# Patient Record
Sex: FEMALE | Race: OTHER | Employment: UNEMPLOYED | URBAN - METROPOLITAN AREA
[De-identification: names, ages, dates, MRNs, and addresses within clinical notes are randomized per-mention and may not be internally consistent; named-entity substitution may affect disease eponyms.]

---

## 2022-11-03 ENCOUNTER — HOSPITAL ENCOUNTER (OUTPATIENT)
Dept: MAMMOGRAPHY | Facility: HOSPITAL | Age: 57
Discharge: HOME | End: 2022-11-03
Attending: FAMILY MEDICINE
Payer: MEDICAID

## 2022-11-03 DIAGNOSIS — Z12.31 ENCOUNTER FOR SCREENING MAMMOGRAM FOR MALIGNANT NEOPLASM OF BREAST: ICD-10-CM

## 2022-11-03 PROCEDURE — 77063 BREAST TOMOSYNTHESIS BI: CPT | Performed by: FAMILY MEDICINE

## 2022-11-03 PROCEDURE — 77067 SCR MAMMO BI INCL CAD: CPT | Performed by: FAMILY MEDICINE

## 2022-12-20 ENCOUNTER — HOSPITAL ENCOUNTER (OUTPATIENT)
Dept: ULTRASOUND IMAGING | Facility: HOSPITAL | Age: 57
Discharge: HOME | End: 2022-12-20
Attending: FAMILY MEDICINE
Payer: MEDICAID

## 2022-12-20 ENCOUNTER — HOSPITAL ENCOUNTER (OUTPATIENT)
Dept: MAMMOGRAPHY | Facility: HOSPITAL | Age: 57
Discharge: HOME | End: 2022-12-20
Attending: FAMILY MEDICINE
Payer: MEDICAID

## 2022-12-20 DIAGNOSIS — R92.8 ABNORMAL MAMMOGRAM: ICD-10-CM

## 2022-12-20 PROCEDURE — 77061 BREAST TOMOSYNTHESIS UNI: CPT | Performed by: FAMILY MEDICINE

## 2022-12-20 PROCEDURE — 77065 DX MAMMO INCL CAD UNI: CPT | Performed by: FAMILY MEDICINE

## 2022-12-20 PROCEDURE — 76642 ULTRASOUND BREAST LIMITED: CPT | Performed by: FAMILY MEDICINE

## 2023-05-15 ENCOUNTER — OFFICE VISIT (OUTPATIENT)
Dept: ORTHOPEDICS CLINIC | Facility: CLINIC | Age: 58
End: 2023-05-15

## 2023-05-15 ENCOUNTER — HOSPITAL ENCOUNTER (OUTPATIENT)
Dept: GENERAL RADIOLOGY | Facility: HOSPITAL | Age: 58
Discharge: HOME | End: 2023-05-15
Attending: ORTHOPAEDIC SURGERY
Payer: MEDICAID

## 2023-05-15 VITALS — WEIGHT: 184 LBS | BODY MASS INDEX: 38.1 KG/M2 | HEIGHT: 58.27 IN

## 2023-05-15 DIAGNOSIS — M25.561 PAIN IN BOTH KNEES, UNSPECIFIED CHRONICITY: ICD-10-CM

## 2023-05-15 DIAGNOSIS — M25.562 PAIN IN BOTH KNEES, UNSPECIFIED CHRONICITY: ICD-10-CM

## 2023-05-15 DIAGNOSIS — M17.11 PRIMARY OSTEOARTHRITIS OF RIGHT KNEE: Primary | ICD-10-CM

## 2023-05-15 DIAGNOSIS — E66.01 CLASS 2 SEVERE OBESITY DUE TO EXCESS CALORIES WITH SERIOUS COMORBIDITY AND BODY MASS INDEX (BMI) OF 38.0 TO 38.9 IN ADULT (HCC): ICD-10-CM

## 2023-05-15 DIAGNOSIS — M17.12 PRIMARY OSTEOARTHRITIS OF LEFT KNEE: ICD-10-CM

## 2023-05-15 PROCEDURE — 73562 X-RAY EXAM OF KNEE 3: CPT | Performed by: ORTHOPAEDIC SURGERY

## 2023-05-22 ENCOUNTER — TELEPHONE (OUTPATIENT)
Dept: ORTHOPEDICS CLINIC | Facility: CLINIC | Age: 58
End: 2023-05-22

## 2023-05-22 NOTE — TELEPHONE ENCOUNTER
Received [employer] Durolane injection denial letter dated 05/21/2023. Tracking # [id number]  There is an [email or web address] peer to peer appeal option within 7 calendar days of 05/21/2023, at [phone]. Ortho staff, how do you want to proceed? Per letter patient did not meet 2 or more of the required criteria:  1. Tried physical therapy  2. Failed to respond to at least two different classes of drugs such as tylenol or advil or drugs used on top of the skin. 3.  Failed to respond to at least two cortisone injections. Your records do not show that two or more of these apply to you.  Unable to approve this request.

## 2023-05-22 NOTE — TELEPHONE ENCOUNTER
Patient seen in office 5/15/23 per note patient was not given cortisone due to her uncontrolled blood glucose levels. Advised hyalyronic acid injections instead of cortisone.     Gel injection for bilateral knees denied see below and advise how you would like to proceed

## 2023-05-22 NOTE — TELEPHONE ENCOUNTER
Glucose levels were 182. Have her keep an eye on them. If they run around 150 or less, reschedule for a cortisone injection. Her insurance won't approve HA without a cortisone injection.

## 2023-05-23 NOTE — TELEPHONE ENCOUNTER
Spoke with patient using  advised her unforunately insurance denied the gel injections for her bilateral knees. Per Dr. Kristel Dixon her blood glucose levels need to be 150 or less in order to proceed with cortisone injections. Advised patient once she is able to better control blood glucose 150 or below Dr. Kristel Dixon would be willing to give her a cortisone injection for her knee. Patient reports will call the office back when she gets her blood glucose under better control for possible cortisone injections of her knees. Patient verbalized understanding had no further questions.

## 2023-06-08 ENCOUNTER — OFFICE VISIT (OUTPATIENT)
Dept: ENDOCRINOLOGY CLINIC | Facility: CLINIC | Age: 58
End: 2023-06-08

## 2023-06-08 VITALS
HEIGHT: 60 IN | DIASTOLIC BLOOD PRESSURE: 72 MMHG | BODY MASS INDEX: 36.71 KG/M2 | WEIGHT: 187 LBS | SYSTOLIC BLOOD PRESSURE: 110 MMHG | HEART RATE: 86 BPM

## 2023-06-08 DIAGNOSIS — E11.65 UNCONTROLLED TYPE 2 DIABETES MELLITUS WITH HYPERGLYCEMIA (HCC): ICD-10-CM

## 2023-06-08 DIAGNOSIS — R10.2 PAIN IN VULVA: Primary | ICD-10-CM

## 2023-06-08 PROCEDURE — 3074F SYST BP LT 130 MM HG: CPT | Performed by: NURSE PRACTITIONER

## 2023-06-08 PROCEDURE — 99204 OFFICE O/P NEW MOD 45 MIN: CPT | Performed by: NURSE PRACTITIONER

## 2023-06-08 PROCEDURE — 3008F BODY MASS INDEX DOCD: CPT | Performed by: NURSE PRACTITIONER

## 2023-06-08 PROCEDURE — 3078F DIAST BP <80 MM HG: CPT | Performed by: NURSE PRACTITIONER

## 2023-06-08 RX ORDER — ATORVASTATIN CALCIUM 10 MG/1
10 TABLET, FILM COATED ORAL NIGHTLY
COMMUNITY
Start: 2023-05-02

## 2023-06-08 RX ORDER — EMPAGLIFLOZIN 25 MG/1
25 TABLET, FILM COATED ORAL
Qty: 90 TABLET | Refills: 0 | Status: SHIPPED | OUTPATIENT
Start: 2023-06-08

## 2023-06-08 RX ORDER — GLIPIZIDE 5 MG/1
5 TABLET ORAL 2 TIMES DAILY
COMMUNITY
Start: 2023-04-21

## 2023-06-08 RX ORDER — DULAGLUTIDE 1.5 MG/.5ML
1.5 INJECTION, SOLUTION SUBCUTANEOUS WEEKLY
Qty: 2 ML | Refills: 0 | Status: SHIPPED | OUTPATIENT
Start: 2023-06-29

## 2023-06-08 RX ORDER — DULAGLUTIDE 0.75 MG/.5ML
0.75 INJECTION, SOLUTION SUBCUTANEOUS WEEKLY
Qty: 2 ML | Refills: 0 | Status: SHIPPED | OUTPATIENT
Start: 2023-06-08

## 2023-06-08 RX ORDER — ASPIRIN 81 MG/1
81 TABLET, COATED ORAL DAILY
COMMUNITY
Start: 2023-04-21

## 2023-06-08 RX ORDER — BLOOD-GLUCOSE METER
KIT MISCELLANEOUS
Qty: 200 STRIP | Refills: 1 | Status: SHIPPED | OUTPATIENT
Start: 2023-06-08

## 2023-06-08 RX ORDER — LANCETS 28 GAUGE
EACH MISCELLANEOUS
Qty: 200 EACH | Refills: 1 | Status: SHIPPED | OUTPATIENT
Start: 2023-06-08

## 2023-06-08 NOTE — PATIENT INSTRUCTIONS
A1C: 9.4% on 6/6/2023   Endocrinology fax# [phone] (for eye exam)     Medications:   - continue with Metformin 1,000mg with breakfast      1,000mg with dinner   - continue with Glipizide 5mg with breakfast       5mg with dinner  - start Jardiance 25mg once daily in the morning   - start Trulicity 8.03XC once weekly for 4 weeks    --> week 5: increase Trulicity 6.9LB once weekly   Common side effects:    Nausea or diarrhea    These effects usually go away over time as your body gets used to the medicine      Here are some things that might help your nausea go away:   Eat small amounts of food instrad of few large meals   Eat plain, bland non greasy foods    Drink plenty of fluids (sugar free)    Avoid foods and smells that might make you sick    Eat slowly and listen to your hunger    - schedule apt with Cyrus (diabetes educator) as soon as possible -[phone]  -schedule apt with gynecology team [phone]    - have labs drawn at your earliest convenience - fasting for 10-12hrs    -Please give me an update on your blood glucose readings in 2 weeks     A1C goal:  <7.0%    Blood sugar testing:  Test your blood sugar 2 times daily   Recommended times to test: Before breakfast (fasting) and before dinner     Blood sugar targets:  Before breakfast:   (preferably < 110)  Before meals OR 2 hours after meals: <180 (preferably <150)     Call for persistent blood sugars < 75 or > 200

## 2023-06-09 ENCOUNTER — LAB ENCOUNTER (OUTPATIENT)
Dept: LAB | Facility: HOSPITAL | Age: 58
End: 2023-06-09
Attending: NURSE PRACTITIONER
Payer: MEDICAID

## 2023-06-09 DIAGNOSIS — E11.65 UNCONTROLLED TYPE 2 DIABETES MELLITUS WITH HYPERGLYCEMIA (HCC): ICD-10-CM

## 2023-06-09 LAB
ALBUMIN SERPL-MCNC: 3.8 G/DL (ref 3.4–5)
ALBUMIN/GLOB SERPL: 1 {RATIO} (ref 1–2)
ALP LIVER SERPL-CCNC: 103 U/L
ALT SERPL-CCNC: 95 U/L
ANION GAP SERPL CALC-SCNC: 7 MMOL/L (ref 0–18)
AST SERPL-CCNC: 45 U/L (ref 15–37)
BILIRUB SERPL-MCNC: 0.6 MG/DL (ref 0.1–2)
BUN BLD-MCNC: 7 MG/DL (ref 7–18)
BUN/CREAT SERPL: 14.6 (ref 10–20)
CALCIUM BLD-MCNC: 9 MG/DL (ref 8.5–10.1)
CHLORIDE SERPL-SCNC: 108 MMOL/L (ref 98–112)
CHOLEST SERPL-MCNC: 172 MG/DL (ref ?–200)
CO2 SERPL-SCNC: 28 MMOL/L (ref 21–32)
CREAT BLD-MCNC: 0.48 MG/DL
CREAT UR-SCNC: 147 MG/DL
FASTING PATIENT LIPID ANSWER: YES
FASTING STATUS PATIENT QL REPORTED: YES
GFR SERPLBLD BASED ON 1.73 SQ M-ARVRAT: 110 ML/MIN/1.73M2 (ref 60–?)
GLOBULIN PLAS-MCNC: 3.9 G/DL (ref 2.8–4.4)
GLUCOSE BLD-MCNC: 117 MG/DL (ref 70–99)
HDLC SERPL-MCNC: 63 MG/DL (ref 40–59)
LDLC SERPL CALC-MCNC: 88 MG/DL (ref ?–100)
MICROALBUMIN UR-MCNC: 1.51 MG/DL
MICROALBUMIN/CREAT 24H UR-RTO: 10.3 UG/MG (ref ?–30)
NONHDLC SERPL-MCNC: 109 MG/DL (ref ?–130)
OSMOLALITY SERPL CALC.SUM OF ELEC: 295 MOSM/KG (ref 275–295)
POTASSIUM SERPL-SCNC: 3.7 MMOL/L (ref 3.5–5.1)
PROT SERPL-MCNC: 7.7 G/DL (ref 6.4–8.2)
SODIUM SERPL-SCNC: 143 MMOL/L (ref 136–145)
TRIGL SERPL-MCNC: 122 MG/DL (ref 30–149)
TSI SER-ACNC: 2.26 MIU/ML (ref 0.36–3.74)
VIT D+METAB SERPL-MCNC: 15.8 NG/ML (ref 30–100)
VLDLC SERPL CALC-MCNC: 20 MG/DL (ref 0–30)

## 2023-06-09 PROCEDURE — 82043 UR ALBUMIN QUANTITATIVE: CPT

## 2023-06-09 PROCEDURE — 84443 ASSAY THYROID STIM HORMONE: CPT

## 2023-06-09 PROCEDURE — 82306 VITAMIN D 25 HYDROXY: CPT

## 2023-06-09 PROCEDURE — 80053 COMPREHEN METABOLIC PANEL: CPT

## 2023-06-09 PROCEDURE — 80061 LIPID PANEL: CPT

## 2023-06-09 PROCEDURE — 36415 COLL VENOUS BLD VENIPUNCTURE: CPT

## 2023-06-09 PROCEDURE — 82570 ASSAY OF URINE CREATININE: CPT

## 2023-06-09 PROCEDURE — 3061F NEG MICROALBUMINURIA REV: CPT | Performed by: NURSE PRACTITIONER

## 2023-06-12 ENCOUNTER — TELEPHONE (OUTPATIENT)
Dept: ENDOCRINOLOGY CLINIC | Facility: CLINIC | Age: 58
End: 2023-06-12

## 2023-06-12 RX ORDER — ERGOCALCIFEROL 1.25 MG/1
50000 CAPSULE ORAL WEEKLY
Qty: 8 CAPSULE | Refills: 0 | Status: SHIPPED | OUTPATIENT
Start: 2023-06-12 | End: 2023-06-15

## 2023-06-13 NOTE — TELEPHONE ENCOUNTER
Endo staff please call patient and inform her kellee there lab results are back and they demonstrate the following:     - normal electrolytes   - normal kidney function and normal protein in the urine     - elevated liver function. Would recommend that she follows up with her PCP regarding this. - normal triglycerides   - normal LDL (bad cholesterol)     - normal thyroid function     - low vitamin d at 15. 8. ideally we prefer that this value is in 50s ranges. Would recommend that she starts tot take ergocalciferol 50,000 units once weekly for 8 weeks, then transition to Vitamin D3 2,000 units once daily from over the counter. rx sent to pharmacy. Thank you!

## 2023-06-14 ENCOUNTER — OFFICE VISIT (OUTPATIENT)
Dept: OBGYN CLINIC | Facility: CLINIC | Age: 58
End: 2023-06-14

## 2023-06-14 VITALS
HEIGHT: 60 IN | DIASTOLIC BLOOD PRESSURE: 81 MMHG | SYSTOLIC BLOOD PRESSURE: 124 MMHG | WEIGHT: 186 LBS | BODY MASS INDEX: 36.52 KG/M2 | HEART RATE: 85 BPM

## 2023-06-14 DIAGNOSIS — Z11.3 SCREENING FOR STDS (SEXUALLY TRANSMITTED DISEASES): ICD-10-CM

## 2023-06-14 DIAGNOSIS — N94.819 VULVODYNIA: Primary | ICD-10-CM

## 2023-06-14 DIAGNOSIS — N94.10 DYSPAREUNIA IN FEMALE: ICD-10-CM

## 2023-06-14 PROBLEM — E11.9 DIABETES MELLITUS (HCC): Status: ACTIVE | Noted: 2023-06-14

## 2023-06-14 LAB
APPEARANCE: CLEAR
BILIRUBIN: NEGATIVE
GLUCOSE (URINE DIPSTICK): 100 MG/DL
KETONES (URINE DIPSTICK): NEGATIVE MG/DL
LEUKOCYTES: NEGATIVE
MULTISTIX LOT#: ABNORMAL NUMERIC
NITRITE, URINE: NEGATIVE
OCCULT BLOOD: NEGATIVE
PH, URINE: 5 (ref 4.5–8)
PROTEIN (URINE DIPSTICK): NEGATIVE MG/DL
SPECIFIC GRAVITY: 1.01 (ref 1–1.03)
URINE-COLOR: YELLOW
UROBILINOGEN,SEMI-QN: 0.2 MG/DL (ref 0–1.9)

## 2023-06-14 PROCEDURE — 99213 OFFICE O/P EST LOW 20 MIN: CPT | Performed by: ADVANCED PRACTICE MIDWIFE

## 2023-06-14 PROCEDURE — 3079F DIAST BP 80-89 MM HG: CPT | Performed by: ADVANCED PRACTICE MIDWIFE

## 2023-06-14 PROCEDURE — 3074F SYST BP LT 130 MM HG: CPT | Performed by: ADVANCED PRACTICE MIDWIFE

## 2023-06-14 PROCEDURE — 3008F BODY MASS INDEX DOCD: CPT | Performed by: ADVANCED PRACTICE MIDWIFE

## 2023-06-14 PROCEDURE — 81002 URINALYSIS NONAUTO W/O SCOPE: CPT | Performed by: ADVANCED PRACTICE MIDWIFE

## 2023-06-14 NOTE — TELEPHONE ENCOUNTER
rn called patient with message below by Lou Ford np, pt verbalized understanding of instructions   Had no questions

## 2023-06-15 LAB
C TRACH DNA SPEC QL NAA+PROBE: NEGATIVE
N GONORRHOEA DNA SPEC QL NAA+PROBE: NEGATIVE

## 2023-06-15 RX ORDER — ERGOCALCIFEROL 1.25 MG/1
CAPSULE ORAL
Qty: 8 CAPSULE | Refills: 0 | Status: SHIPPED | OUTPATIENT
Start: 2023-06-15

## 2023-06-16 LAB — TRICHOMONAS SCREEN: NEGATIVE

## 2023-06-19 ENCOUNTER — TELEPHONE (OUTPATIENT)
Dept: HEMATOLOGY/ONCOLOGY | Facility: HOSPITAL | Age: 58
End: 2023-06-19

## 2023-06-19 NOTE — TELEPHONE ENCOUNTER
Patients daughter Lyly Ghosh called to schedule new consult for high platelets. With a hematologist in [facility]. Has [facility].

## 2023-06-20 ENCOUNTER — TELEPHONE (OUTPATIENT)
Dept: HEMATOLOGY/ONCOLOGY | Facility: HOSPITAL | Age: 58
End: 2023-06-20

## 2023-06-20 NOTE — TELEPHONE ENCOUNTER
Patient scheduled with Dr. Siri Graf for New Consult appointment 6/23/23 at 21  AM. Records placed in Dr. Kenny Rowell outside of his office on 6/20/23 for [facility].

## 2023-06-21 RX ORDER — FLUCONAZOLE 150 MG/1
150 TABLET ORAL
Qty: 2 TABLET | Refills: 0 | Status: SHIPPED | OUTPATIENT
Start: 2023-06-21 | End: 2023-06-25

## 2023-06-23 ENCOUNTER — OFFICE VISIT (OUTPATIENT)
Dept: HEMATOLOGY/ONCOLOGY | Facility: HOSPITAL | Age: 58
End: 2023-06-23
Attending: STUDENT IN AN ORGANIZED HEALTH CARE EDUCATION/TRAINING PROGRAM
Payer: MEDICAID

## 2023-06-23 VITALS
HEIGHT: 58 IN | TEMPERATURE: 99 F | BODY MASS INDEX: 39.04 KG/M2 | DIASTOLIC BLOOD PRESSURE: 72 MMHG | RESPIRATION RATE: 18 BRPM | WEIGHT: 186 LBS | SYSTOLIC BLOOD PRESSURE: 122 MMHG | HEART RATE: 78 BPM | OXYGEN SATURATION: 96 %

## 2023-06-23 DIAGNOSIS — D75.839 THROMBOCYTOSIS: Primary | ICD-10-CM

## 2023-06-23 LAB
BASOPHILS # BLD AUTO: 0.04 X10(3) UL (ref 0–0.2)
BASOPHILS NFR BLD AUTO: 0.4 %
DEPRECATED HBV CORE AB SER IA-ACNC: 21.4 NG/ML
DEPRECATED RDW RBC AUTO: 42.9 FL (ref 35.1–46.3)
EOSINOPHIL # BLD AUTO: 0.05 X10(3) UL (ref 0–0.7)
EOSINOPHIL NFR BLD AUTO: 0.5 %
ERYTHROCYTE [DISTWIDTH] IN BLOOD BY AUTOMATED COUNT: 12.4 % (ref 11–15)
HAV AB SER QL IA: REACTIVE
HAV IGM SER QL: NONREACTIVE
HBV CORE AB SERPL QL IA: NONREACTIVE
HBV SURFACE AB SER QL: NONREACTIVE
HBV SURFACE AB SERPL IA-ACNC: <3.1 MIU/ML
HBV SURFACE AG SERPL QL IA: NONREACTIVE
HCT VFR BLD AUTO: 42.6 %
HCV AB SERPL QL IA: NONREACTIVE
HGB BLD-MCNC: 13.9 G/DL
IMM GRANULOCYTES # BLD AUTO: 0.04 X10(3) UL (ref 0–1)
IMM GRANULOCYTES NFR BLD: 0.4 %
IRON SATN MFR SERPL: 17 %
IRON SERPL-MCNC: 58 UG/DL
LYMPHOCYTES # BLD AUTO: 3.55 X10(3) UL (ref 1–4)
LYMPHOCYTES NFR BLD AUTO: 34.6 %
MCH RBC QN AUTO: 30.6 PG (ref 26–34)
MCHC RBC AUTO-ENTMCNC: 32.6 G/DL (ref 31–37)
MCV RBC AUTO: 93.8 FL
MONOCYTES # BLD AUTO: 0.76 X10(3) UL (ref 0.1–1)
MONOCYTES NFR BLD AUTO: 7.4 %
NEUTROPHILS # BLD AUTO: 5.82 X10 (3) UL (ref 1.5–7.7)
NEUTROPHILS # BLD AUTO: 5.82 X10(3) UL (ref 1.5–7.7)
NEUTROPHILS NFR BLD AUTO: 56.7 %
PLATELET # BLD AUTO: 391 10(3)UL (ref 150–450)
RBC # BLD AUTO: 4.54 X10(6)UL
TIBC SERPL-MCNC: 344 UG/DL (ref 240–450)
TRANSFERRIN SERPL-MCNC: 231 MG/DL (ref 200–360)
WBC # BLD AUTO: 10.3 X10(3) UL (ref 4–11)

## 2023-06-23 PROCEDURE — 86706 HEP B SURFACE ANTIBODY: CPT | Performed by: STUDENT IN AN ORGANIZED HEALTH CARE EDUCATION/TRAINING PROGRAM

## 2023-06-23 PROCEDURE — 84466 ASSAY OF TRANSFERRIN: CPT | Performed by: STUDENT IN AN ORGANIZED HEALTH CARE EDUCATION/TRAINING PROGRAM

## 2023-06-23 PROCEDURE — 80503 PATH CLIN CONSLTJ SF 5-20: CPT | Performed by: STUDENT IN AN ORGANIZED HEALTH CARE EDUCATION/TRAINING PROGRAM

## 2023-06-23 PROCEDURE — 87340 HEPATITIS B SURFACE AG IA: CPT | Performed by: STUDENT IN AN ORGANIZED HEALTH CARE EDUCATION/TRAINING PROGRAM

## 2023-06-23 PROCEDURE — 99211 OFF/OP EST MAY X REQ PHY/QHP: CPT

## 2023-06-23 PROCEDURE — 87389 HIV-1 AG W/HIV-1&-2 AB AG IA: CPT | Performed by: STUDENT IN AN ORGANIZED HEALTH CARE EDUCATION/TRAINING PROGRAM

## 2023-06-23 PROCEDURE — 86708 HEPATITIS A ANTIBODY: CPT | Performed by: STUDENT IN AN ORGANIZED HEALTH CARE EDUCATION/TRAINING PROGRAM

## 2023-06-23 PROCEDURE — 86803 HEPATITIS C AB TEST: CPT | Performed by: STUDENT IN AN ORGANIZED HEALTH CARE EDUCATION/TRAINING PROGRAM

## 2023-06-23 PROCEDURE — 36415 COLL VENOUS BLD VENIPUNCTURE: CPT

## 2023-06-23 PROCEDURE — 85060 BLOOD SMEAR INTERPRETATION: CPT | Performed by: STUDENT IN AN ORGANIZED HEALTH CARE EDUCATION/TRAINING PROGRAM

## 2023-06-23 PROCEDURE — 83540 ASSAY OF IRON: CPT | Performed by: STUDENT IN AN ORGANIZED HEALTH CARE EDUCATION/TRAINING PROGRAM

## 2023-06-23 PROCEDURE — 86709 HEPATITIS A IGM ANTIBODY: CPT | Performed by: STUDENT IN AN ORGANIZED HEALTH CARE EDUCATION/TRAINING PROGRAM

## 2023-06-23 PROCEDURE — 85025 COMPLETE CBC W/AUTO DIFF WBC: CPT | Performed by: STUDENT IN AN ORGANIZED HEALTH CARE EDUCATION/TRAINING PROGRAM

## 2023-06-23 PROCEDURE — 86704 HEP B CORE ANTIBODY TOTAL: CPT | Performed by: STUDENT IN AN ORGANIZED HEALTH CARE EDUCATION/TRAINING PROGRAM

## 2023-06-23 PROCEDURE — 82728 ASSAY OF FERRITIN: CPT | Performed by: STUDENT IN AN ORGANIZED HEALTH CARE EDUCATION/TRAINING PROGRAM

## 2023-06-27 ENCOUNTER — MED REC SCAN ONLY (OUTPATIENT)
Dept: ENDOCRINOLOGY CLINIC | Facility: CLINIC | Age: 58
End: 2023-06-27

## 2023-06-27 ENCOUNTER — PATIENT MESSAGE (OUTPATIENT)
Dept: ENDOCRINOLOGY CLINIC | Facility: CLINIC | Age: 58
End: 2023-06-27

## 2023-06-28 ENCOUNTER — TELEPHONE (OUTPATIENT)
Dept: OBGYN CLINIC | Facility: CLINIC | Age: 58
End: 2023-06-28

## 2023-07-03 ENCOUNTER — HOSPITAL ENCOUNTER (OUTPATIENT)
Dept: MAMMOGRAPHY | Facility: HOSPITAL | Age: 58
Discharge: HOME | End: 2023-07-03
Attending: FAMILY MEDICINE
Payer: MEDICAID

## 2023-07-03 ENCOUNTER — TELEPHONE (OUTPATIENT)
Dept: HEMATOLOGY/ONCOLOGY | Facility: HOSPITAL | Age: 58
End: 2023-07-03

## 2023-07-03 DIAGNOSIS — R92.8 ABNORMAL MAMMOGRAM: ICD-10-CM

## 2023-07-03 PROCEDURE — 77065 DX MAMMO INCL CAD UNI: CPT | Performed by: FAMILY MEDICINE

## 2023-07-03 PROCEDURE — 77061 BREAST TOMOSYNTHESIS UNI: CPT | Performed by: FAMILY MEDICINE

## 2023-07-05 RX ORDER — DULAGLUTIDE 1.5 MG/.5ML
INJECTION, SOLUTION SUBCUTANEOUS
Qty: 6 ML | Refills: 0 | Status: SHIPPED | OUTPATIENT
Start: 2023-07-05

## 2023-08-10 ENCOUNTER — OFFICE VISIT (OUTPATIENT)
Dept: ENDOCRINOLOGY CLINIC | Facility: CLINIC | Age: 58
End: 2023-08-10

## 2023-08-10 VITALS
SYSTOLIC BLOOD PRESSURE: 104 MMHG | WEIGHT: 180 LBS | HEART RATE: 85 BPM | DIASTOLIC BLOOD PRESSURE: 70 MMHG | BODY MASS INDEX: 38 KG/M2

## 2023-08-10 DIAGNOSIS — E11.9 TYPE 2 DIABETES MELLITUS WITHOUT COMPLICATION, WITHOUT LONG-TERM CURRENT USE OF INSULIN (HCC): Primary | ICD-10-CM

## 2023-08-10 DIAGNOSIS — E78.5 HYPERLIPIDEMIA, UNSPECIFIED HYPERLIPIDEMIA TYPE: ICD-10-CM

## 2023-08-10 LAB
CARTRIDGE LOT#: ABNORMAL NUMERIC
GLUCOSE BLOOD: 107
HEMOGLOBIN A1C: 6.5 % (ref 4.3–5.6)
TEST STRIP LOT #: NORMAL NUMERIC

## 2023-08-10 PROCEDURE — 82947 ASSAY GLUCOSE BLOOD QUANT: CPT | Performed by: NURSE PRACTITIONER

## 2023-08-10 PROCEDURE — 3074F SYST BP LT 130 MM HG: CPT | Performed by: NURSE PRACTITIONER

## 2023-08-10 PROCEDURE — 3078F DIAST BP <80 MM HG: CPT | Performed by: NURSE PRACTITIONER

## 2023-08-10 PROCEDURE — 3044F HG A1C LEVEL LT 7.0%: CPT | Performed by: NURSE PRACTITIONER

## 2023-08-10 PROCEDURE — 99214 OFFICE O/P EST MOD 30 MIN: CPT | Performed by: NURSE PRACTITIONER

## 2023-08-10 PROCEDURE — 83036 HEMOGLOBIN GLYCOSYLATED A1C: CPT | Performed by: NURSE PRACTITIONER

## 2023-08-10 RX ORDER — GLIPIZIDE 5 MG/1
TABLET ORAL
Qty: 135 TABLET | Refills: 0 | Status: SHIPPED | OUTPATIENT
Start: 2023-08-10 | End: 2023-11-08

## 2023-08-10 RX ORDER — DULAGLUTIDE 3 MG/.5ML
3 INJECTION, SOLUTION SUBCUTANEOUS WEEKLY
Qty: 2 ML | Refills: 0 | Status: SHIPPED | OUTPATIENT
Start: 2023-08-10

## 2023-08-14 ENCOUNTER — OFFICE VISIT (OUTPATIENT)
Dept: RHEUMATOLOGY | Facility: CLINIC | Age: 58
End: 2023-08-14

## 2023-08-14 ENCOUNTER — LAB ENCOUNTER (OUTPATIENT)
Dept: LAB | Facility: HOSPITAL | Age: 58
End: 2023-08-14
Attending: FAMILY MEDICINE
Payer: MEDICAID

## 2023-08-14 VITALS
HEIGHT: 58 IN | SYSTOLIC BLOOD PRESSURE: 108 MMHG | WEIGHT: 180 LBS | RESPIRATION RATE: 16 BRPM | BODY MASS INDEX: 37.79 KG/M2 | HEART RATE: 80 BPM | DIASTOLIC BLOOD PRESSURE: 72 MMHG

## 2023-08-14 DIAGNOSIS — M25.50 POLYARTHRALGIA: ICD-10-CM

## 2023-08-14 DIAGNOSIS — M79.10 MYALGIA: ICD-10-CM

## 2023-08-14 DIAGNOSIS — M25.50 POLYARTHRALGIA: Primary | ICD-10-CM

## 2023-08-14 DIAGNOSIS — R51.9 TEMPORAL HEADACHE: ICD-10-CM

## 2023-08-14 LAB
CK SERPL-CCNC: 80 U/L
CRP SERPL-MCNC: <0.29 MG/DL (ref ?–0.3)
ERYTHROCYTE [SEDIMENTATION RATE] IN BLOOD: 10 MM/HR
RHEUMATOID FACT SERPL-ACNC: <10 IU/ML (ref ?–15)

## 2023-08-14 PROCEDURE — 86140 C-REACTIVE PROTEIN: CPT

## 2023-08-14 PROCEDURE — 82085 ASSAY OF ALDOLASE: CPT

## 2023-08-14 PROCEDURE — 86200 CCP ANTIBODY: CPT

## 2023-08-14 PROCEDURE — 3008F BODY MASS INDEX DOCD: CPT | Performed by: INTERNAL MEDICINE

## 2023-08-14 PROCEDURE — 99205 OFFICE O/P NEW HI 60 MIN: CPT | Performed by: INTERNAL MEDICINE

## 2023-08-14 PROCEDURE — 36415 COLL VENOUS BLD VENIPUNCTURE: CPT

## 2023-08-14 PROCEDURE — 86037 ANCA TITER EACH ANTIBODY: CPT

## 2023-08-14 PROCEDURE — 3074F SYST BP LT 130 MM HG: CPT | Performed by: INTERNAL MEDICINE

## 2023-08-14 PROCEDURE — 86431 RHEUMATOID FACTOR QUANT: CPT

## 2023-08-14 PROCEDURE — 3078F DIAST BP <80 MM HG: CPT | Performed by: INTERNAL MEDICINE

## 2023-08-14 PROCEDURE — 85652 RBC SED RATE AUTOMATED: CPT

## 2023-08-14 PROCEDURE — 82550 ASSAY OF CK (CPK): CPT

## 2023-08-14 PROCEDURE — 86038 ANTINUCLEAR ANTIBODIES: CPT

## 2023-08-14 PROCEDURE — 83516 IMMUNOASSAY NONANTIBODY: CPT

## 2023-08-14 RX ORDER — PREDNISONE 20 MG/1
TABLET ORAL
Qty: 51 TABLET | Refills: 1 | Status: SHIPPED | OUTPATIENT
Start: 2023-08-14

## 2023-08-14 NOTE — PATIENT INSTRUCTIONS
start prednisone 60mg x 1 week, then 40mg x 1 week, then 20mg a day -   Check labs today   Plan for temporal artery biopsy - will try to get you to see Dr. Graham Becerra or other surgery for biopsy   Return to clinic in 2-3 weeks   Will need to have another plan to help control sugars   - sept 8th at 2:10 pm

## 2023-08-15 ENCOUNTER — OFFICE VISIT (OUTPATIENT)
Dept: ORTHOPEDICS CLINIC | Facility: CLINIC | Age: 58
End: 2023-08-15

## 2023-08-15 VITALS — BODY MASS INDEX: 35.53 KG/M2 | HEIGHT: 60 IN | WEIGHT: 181 LBS

## 2023-08-15 DIAGNOSIS — M25.562 PAIN IN BOTH KNEES, UNSPECIFIED CHRONICITY: ICD-10-CM

## 2023-08-15 DIAGNOSIS — M17.12 PRIMARY OSTEOARTHRITIS OF LEFT KNEE: Primary | ICD-10-CM

## 2023-08-15 DIAGNOSIS — M17.11 PRIMARY OSTEOARTHRITIS OF RIGHT KNEE: ICD-10-CM

## 2023-08-15 DIAGNOSIS — M25.561 PAIN IN BOTH KNEES, UNSPECIFIED CHRONICITY: ICD-10-CM

## 2023-08-15 DIAGNOSIS — E66.01 CLASS 2 SEVERE OBESITY DUE TO EXCESS CALORIES WITH SERIOUS COMORBIDITY AND BODY MASS INDEX (BMI) OF 35.0 TO 35.9 IN ADULT: ICD-10-CM

## 2023-08-15 PROCEDURE — 3008F BODY MASS INDEX DOCD: CPT | Performed by: ORTHOPAEDIC SURGERY

## 2023-08-15 PROCEDURE — 99213 OFFICE O/P EST LOW 20 MIN: CPT | Performed by: ORTHOPAEDIC SURGERY

## 2023-08-16 LAB
ALDOLASE: 10.7 U/L
CCP IGG SERPL-ACNC: 0.6 U/ML (ref 0–6.9)

## 2023-08-17 LAB
ANTI-MPO ANTIBODIES: <0.2 UNITS
ANTI-PR3 ANTIBODIES: <0.2 UNITS
NUCLEAR IGG TITR SER IF: NEGATIVE {TITER}

## 2023-08-21 ENCOUNTER — OFFICE VISIT (OUTPATIENT)
Dept: SURGERY | Facility: CLINIC | Age: 58
End: 2023-08-21

## 2023-08-21 VITALS — WEIGHT: 181 LBS | HEIGHT: 60 IN | BODY MASS INDEX: 35.53 KG/M2

## 2023-08-21 DIAGNOSIS — R51.9 RIGHT SIDED TEMPORAL HEADACHE: Primary | ICD-10-CM

## 2023-08-21 PROCEDURE — 99244 OFF/OP CNSLTJ NEW/EST MOD 40: CPT | Performed by: SURGERY

## 2023-08-21 PROCEDURE — 3008F BODY MASS INDEX DOCD: CPT | Performed by: SURGERY

## 2023-08-21 NOTE — H&P
Chief complaint: Patient presents with: Eye Visual Problem: Referred by Dr. Zenon Harrell for TAB consult du to Right eye visual changes for 3 days. HPI: Maxim Healy has had right temporal headache, r vision changes. TAB requested by Dr. Zenon Harrell. Past medical history:   Past Medical History:   Diagnosis Date    Diabetes mellitus ([facility] [address]        Past surgical history:   Past Surgical History:   Procedure Laterality Date    HYSTERECTOMY      TUBAL LIGATION         Allergies: No Known Allergies    Medications:   Current Outpatient Medications   Medication Sig Dispense Refill    predniSONE 20 MG Oral Tab Take 60mg x 1 week, then 40mg x 1 week, then stay on 20mg a day 51 tablet 1    glipiZIDE 5 MG Oral Tab Take 0.5 tablets (2.5 mg total) by mouth daily with breakfast AND 1 tablet (5 mg total) daily with dinner. 135 tablet 0    Dulaglutide (TRULICITY) 3 PY/8.2KF Subcutaneous Solution Pen-injector Inject 3 mg into the skin once a week. 2 mL 0    ERGOCALCIFEROL 1.25 MG (57508 UT) Oral Cap TAKE 1 CAPSULE BY MOUTH 1 TIME A WEEK 8 capsule 0    atorvastatin 10 MG Oral Tab Take 1 tablet (10 mg total) by mouth nightly. TAKE AT BEDTIME      metFORMIN HCl 1000 MG Oral Tab Take 1 tablet (1,000 mg total) by mouth 2 (two) times daily. ASPIRIN LOW DOSE 81 MG Oral Tab EC Take 1 tablet (81 mg total) by mouth daily. Empagliflozin (JARDIANCE) 25 MG Oral Tab Take 25 mg by mouth every morning before breakfast. 90 tablet 0       Social history:   Social History    Socioeconomic History      Marital status:     Tobacco Use      Smoking status: Never      Smokeless tobacco: Never    Vaping Use      Vaping Use: Never used    Substance and Sexual Activity      Alcohol use: Not Currently      Drug use: Never      Sexual activity: Yes       Family history:  History reviewed. No pertinent family history.      Review of Systems:   GENERAL: feels generally well  SKIN: no ulcerated or worrisome skin lesions  EYES:denies blurred vision or double vision  HEENT: denies new nasal congestion, sinus pain or ST  LUNGS: denies shortness of breath with exertion  CARDIOVASCULAR: denies chest pain on exertion  GI: no hematemesis, no BRBPR, no worsening heartburn  : no dysuria, no blood in urine, no difficulty urinating  MUSCULOSKELETAL: no new musculoskeletal complaints  NEURO: no persistent, recurrent  headaches  PSYCHE:no depression or anxiety  HEMATOLOGIC: no hx of blood dyscrasia  ENDOCRINE: no new endocrine problems  ALL/ASTHMA: no new hx of severe allergy or asthma  BACK: normal, no spinal deformity, no CVA tenderness    Physical examination:     Constitutional: appears well hydrated alert and responsive no acute distress noted  HEENT wnl, anicteric, PERRL, normocephalic, atraumatic  Neck supple, norm ROM, no JVD  L CTA B  H Reg rate  Abd soft, NT, ND, no masses, no hernias, no HSM. Extr no c/c/e  Skin intact, no jaundice, no rashes, no lesions  Neuro grossly intact, no focal deficits, no tremors  Back no deformity, no CVA tnd. Assessment and plan:  Diagnoses and all orders for this visit:    Right sided temporal headache       Plan TAB  We have discussed the surgical risks, benefits, alternatives, and expected recovery. All of the patient's questions have been answered to her satisfaction.       Duncan Friedman MD  8/21/2023  10:17 AM

## 2023-08-24 ENCOUNTER — TELEPHONE (OUTPATIENT)
Dept: ORTHOPEDICS CLINIC | Facility: CLINIC | Age: 58
End: 2023-08-24

## 2023-08-24 NOTE — TELEPHONE ENCOUNTER
Type of surgery:  Left total knee arthroplasty  Date: 11/3/23  Location: [facility]  Medical Clearance:      *Medical: Yes      *Dental: Yes      *Other:  Prior Authorization Status: Pending  Workers Comp:  Medacta/Ramírez: Tarah Mike: Yes  POV: 11/20/23

## 2023-08-24 NOTE — TELEPHONE ENCOUNTER
Spoke with patient's daughter Beryle Adam) to offer surgery dates. She chose 11/3. She was informed that her mother must have medical and dental clearance within 30 days prior to her surgery.

## 2023-08-30 ENCOUNTER — HOSPITAL ENCOUNTER (OUTPATIENT)
Dept: MRI IMAGING | Age: 58
Discharge: HOME | End: 2023-08-30
Attending: ORTHOPAEDIC SURGERY
Payer: MEDICAID

## 2023-08-30 DIAGNOSIS — M17.12 PRIMARY OSTEOARTHRITIS OF LEFT KNEE: ICD-10-CM

## 2023-08-30 DIAGNOSIS — M25.561 PAIN IN BOTH KNEES, UNSPECIFIED CHRONICITY: ICD-10-CM

## 2023-08-30 DIAGNOSIS — E66.01 CLASS 2 SEVERE OBESITY DUE TO EXCESS CALORIES WITH SERIOUS COMORBIDITY AND BODY MASS INDEX (BMI) OF 35.0 TO 35.9 IN ADULT: ICD-10-CM

## 2023-08-30 DIAGNOSIS — M25.562 PAIN IN BOTH KNEES, UNSPECIFIED CHRONICITY: ICD-10-CM

## 2023-08-30 PROCEDURE — 73721 MRI JNT OF LWR EXTRE W/O DYE: CPT | Performed by: ORTHOPAEDIC SURGERY

## 2023-09-06 ENCOUNTER — OFFICE VISIT (OUTPATIENT)
Dept: SURGERY | Facility: CLINIC | Age: 58
End: 2023-09-06

## 2023-09-06 VITALS — BODY MASS INDEX: 35.53 KG/M2 | HEIGHT: 60 IN | WEIGHT: 181 LBS

## 2023-09-06 DIAGNOSIS — R51.9 RIGHT-SIDED HEADACHE: Primary | ICD-10-CM

## 2023-09-06 DIAGNOSIS — R51.9 RIGHT SIDED TEMPORAL HEADACHE: ICD-10-CM

## 2023-09-06 PROCEDURE — 3008F BODY MASS INDEX DOCD: CPT | Performed by: SURGERY

## 2023-09-06 PROCEDURE — 37609 LIGATION/BX TEMPORAL ARTERY: CPT | Performed by: SURGERY

## 2023-09-07 RX ORDER — BLOOD SUGAR DIAGNOSTIC
STRIP MISCELLANEOUS 2 TIMES DAILY
Qty: 200 STRIP | Refills: 1 | Status: SHIPPED | OUTPATIENT
Start: 2023-09-07

## 2023-09-07 RX ORDER — DULAGLUTIDE 3 MG/.5ML
3 INJECTION, SOLUTION SUBCUTANEOUS WEEKLY
Qty: 2 ML | Refills: 0 | Status: SHIPPED | OUTPATIENT
Start: 2023-09-07

## 2023-09-07 NOTE — PROCEDURES
The risks, benefits, alternatives and expected recovery were explained. The pt expressed understanding and wished to proceed with the procedure. Preop:   Headache  Postop:  Same  Procedure: Excision of the right temporal artery with layered closure of 3.5 cm incision. Anesthesia:  Local, 1% lidocaine with epi, 12 cc  EBL:  Minimal  Description: The temporal artery was palpated and Doppler localization was performed. The skin over the artery was marked. The skin was prepped and draped in sterile fashion. The skin and subcutaneous tissue surrounding the temporal artery was infiltrated with local anesthetic. A 3.5 cm incision was made of the skin overlying the artery. The artery was dissected free and clamped distally and proximally. The artery was tied with 3-0 Vicryl suture and excised. The artery was placed directly into formalin. Hemostasis was assured. The defect was irrigated with saline. The wound was closed in a layered fashion with 3.0 vicryl and 4.0 vicryl. Layered closure was necessary due to the size of the defect and the tension across the incision due to its location. Steris were applied and a gauze dressing was placed over this. The patient tolerated the procedure well and was given wound care instructions. The specimen was sent for routine pathology. The patient tolerated the procedure well, there were no complications, the estimated blood loss was less than 5 cc.

## 2023-09-08 ENCOUNTER — OFFICE VISIT (OUTPATIENT)
Dept: RHEUMATOLOGY | Facility: CLINIC | Age: 58
End: 2023-09-08

## 2023-09-08 VITALS
WEIGHT: 177 LBS | HEART RATE: 92 BPM | BODY MASS INDEX: 32.99 KG/M2 | HEIGHT: 61.52 IN | DIASTOLIC BLOOD PRESSURE: 56 MMHG | SYSTOLIC BLOOD PRESSURE: 96 MMHG

## 2023-09-08 DIAGNOSIS — M79.10 MYALGIA: ICD-10-CM

## 2023-09-08 DIAGNOSIS — M25.50 POLYARTHRALGIA: Primary | ICD-10-CM

## 2023-09-08 PROCEDURE — 3074F SYST BP LT 130 MM HG: CPT | Performed by: INTERNAL MEDICINE

## 2023-09-08 PROCEDURE — 3078F DIAST BP <80 MM HG: CPT | Performed by: INTERNAL MEDICINE

## 2023-09-08 PROCEDURE — 3008F BODY MASS INDEX DOCD: CPT | Performed by: INTERNAL MEDICINE

## 2023-09-08 PROCEDURE — 99214 OFFICE O/P EST MOD 30 MIN: CPT | Performed by: INTERNAL MEDICINE

## 2023-09-08 RX ORDER — PREDNISONE 5 MG/1
5 TABLET ORAL DAILY
Qty: 7 TABLET | Refills: 0 | Status: SHIPPED | OUTPATIENT
Start: 2023-09-08

## 2023-09-08 RX ORDER — GABAPENTIN 300 MG/1
300 CAPSULE ORAL NIGHTLY
Qty: 30 CAPSULE | Refills: 3 | Status: SHIPPED | OUTPATIENT
Start: 2023-09-08

## 2023-10-02 RX ORDER — DULAGLUTIDE 3 MG/.5ML
3 INJECTION, SOLUTION SUBCUTANEOUS WEEKLY
Qty: 2 ML | Refills: 2 | Status: SHIPPED | OUTPATIENT
Start: 2023-10-02

## 2023-10-04 RX ORDER — DULAGLUTIDE 3 MG/.5ML
3 INJECTION, SOLUTION SUBCUTANEOUS WEEKLY
Qty: 2 ML | Refills: 2 | Status: SHIPPED | OUTPATIENT
Start: 2023-10-04

## 2023-10-10 ENCOUNTER — TELEPHONE (OUTPATIENT)
Dept: ORTHOPEDICS CLINIC | Facility: CLINIC | Age: 58
End: 2023-10-10

## 2023-10-17 ENCOUNTER — PATIENT MESSAGE (OUTPATIENT)
Dept: ORTHOPEDICS CLINIC | Facility: CLINIC | Age: 58
End: 2023-10-17

## 2023-10-23 ENCOUNTER — APPOINTMENT (OUTPATIENT)
Dept: HEMATOLOGY/ONCOLOGY | Facility: HOSPITAL | Age: 58
End: 2023-10-23
Attending: STUDENT IN AN ORGANIZED HEALTH CARE EDUCATION/TRAINING PROGRAM
Payer: MEDICAID

## 2023-10-23 NOTE — TELEPHONE ENCOUNTER
Dental clearance recvd  Medical clearance pending - referral to Cardiologist.    Called and spoke with patient's daughter - she thought that the EKG machine was broken at the PCP office. I informed her that no, that means that she needs to see a Cardiologist. Andre Ratel her a phone number to call, to see if they are able to get in. [address], [facility] [address]. She will call us back, if she is unable to get her mom in prior to 11/3/23 surgery.

## 2023-10-27 ENCOUNTER — LAB ENCOUNTER (OUTPATIENT)
Dept: LAB | Facility: HOSPITAL | Age: 58
End: 2023-10-27
Attending: ORTHOPAEDIC SURGERY

## 2023-10-27 DIAGNOSIS — Z01.818 PREOP TESTING: ICD-10-CM

## 2023-10-27 LAB — MRSA DNA SPEC QL NAA+PROBE: NEGATIVE

## 2023-10-27 PROCEDURE — 87641 MR-STAPH DNA AMP PROBE: CPT

## 2023-11-03 ENCOUNTER — ANESTHESIA EVENT (OUTPATIENT)
Dept: SURGERY | Facility: HOSPITAL | Age: 58
End: 2023-11-03
Payer: MEDICAID

## 2023-11-03 ENCOUNTER — ANESTHESIA (OUTPATIENT)
Dept: SURGERY | Facility: HOSPITAL | Age: 58
End: 2023-11-03
Payer: MEDICAID

## 2023-11-03 ENCOUNTER — HOSPITAL ENCOUNTER (OUTPATIENT)
Facility: HOSPITAL | Age: 58
Discharge: HOME HEALTH | End: 2023-11-04
Attending: ORTHOPAEDIC SURGERY | Admitting: ORTHOPAEDIC SURGERY
Payer: MEDICAID

## 2023-11-03 ENCOUNTER — APPOINTMENT (OUTPATIENT)
Dept: GENERAL RADIOLOGY | Facility: HOSPITAL | Age: 58
End: 2023-11-03
Attending: ORTHOPAEDIC SURGERY
Payer: MEDICAID

## 2023-11-03 DIAGNOSIS — M17.12 PRIMARY OSTEOARTHRITIS OF LEFT KNEE: ICD-10-CM

## 2023-11-03 DIAGNOSIS — E66.01 CLASS 2 SEVERE OBESITY DUE TO EXCESS CALORIES WITH SERIOUS COMORBIDITY AND BODY MASS INDEX (BMI) OF 35.0 TO 35.9 IN ADULT: ICD-10-CM

## 2023-11-03 DIAGNOSIS — Z01.818 PREOP TESTING: Primary | ICD-10-CM

## 2023-11-03 PROBLEM — E11.9 DIABETES MELLITUS, TYPE 2 (HCC): Status: ACTIVE | Noted: 2023-06-14

## 2023-11-03 PROBLEM — E66.811 CLASS 1 OBESITY DUE TO EXCESS CALORIES WITH SERIOUS COMORBIDITY AND BODY MASS INDEX (BMI) OF 34.0 TO 34.9 IN ADULT: Status: ACTIVE | Noted: 2023-11-03

## 2023-11-03 PROBLEM — E66.09 CLASS 1 OBESITY DUE TO EXCESS CALORIES WITH SERIOUS COMORBIDITY AND BODY MASS INDEX (BMI) OF 34.0 TO 34.9 IN ADULT: Status: ACTIVE | Noted: 2023-11-03

## 2023-11-03 LAB
GLUCOSE BLDC GLUCOMTR-MCNC: 117 MG/DL (ref 70–99)
GLUCOSE BLDC GLUCOMTR-MCNC: 119 MG/DL (ref 70–99)
GLUCOSE BLDC GLUCOMTR-MCNC: 127 MG/DL (ref 70–99)
GLUCOSE BLDC GLUCOMTR-MCNC: 135 MG/DL (ref 70–99)
GLUCOSE BLDC GLUCOMTR-MCNC: 164 MG/DL (ref 70–99)

## 2023-11-03 PROCEDURE — 0SRD069 REPLACEMENT OF LEFT KNEE JOINT WITH OXIDIZED ZIRCONIUM ON POLYETHYLENE SYNTHETIC SUBSTITUTE, CEMENTED, OPEN APPROACH: ICD-10-PCS | Performed by: ORTHOPAEDIC SURGERY

## 2023-11-03 PROCEDURE — 99204 OFFICE O/P NEW MOD 45 MIN: CPT | Performed by: HOSPITALIST

## 2023-11-03 PROCEDURE — 73560 X-RAY EXAM OF KNEE 1 OR 2: CPT | Performed by: ORTHOPAEDIC SURGERY

## 2023-11-03 DEVICE — IMPLANTABLE DEVICE
Type: IMPLANTABLE DEVICE | Site: KNEE | Status: FUNCTIONAL
Brand: REFOBACIN® BONE CEMENT R

## 2023-11-03 DEVICE — IMPLANTABLE DEVICE
Type: IMPLANTABLE DEVICE | Site: KNEE | Status: FUNCTIONAL
Brand: PERSONA®

## 2023-11-03 DEVICE — IMPLANTABLE DEVICE
Type: IMPLANTABLE DEVICE | Site: KNEE | Status: FUNCTIONAL
Brand: PERSONA® NATURAL TIBIA®

## 2023-11-03 DEVICE — IMPLANTABLE DEVICE
Type: IMPLANTABLE DEVICE | Site: KNEE | Status: FUNCTIONAL
Brand: PERSONA® VIVACIT-E®

## 2023-11-03 RX ORDER — SODIUM CHLORIDE 9 MG/ML
INJECTION, SOLUTION INTRAVENOUS CONTINUOUS
Status: DISCONTINUED | OUTPATIENT
Start: 2023-11-03 | End: 2023-11-04

## 2023-11-03 RX ORDER — NALBUPHINE HYDROCHLORIDE 10 MG/ML
2.5 INJECTION, SOLUTION INTRAMUSCULAR; INTRAVENOUS; SUBCUTANEOUS EVERY 4 HOURS PRN
Status: ACTIVE | OUTPATIENT
Start: 2023-11-03 | End: 2023-11-04

## 2023-11-03 RX ORDER — NICOTINE POLACRILEX 4 MG
30 LOZENGE BUCCAL
Status: DISCONTINUED | OUTPATIENT
Start: 2023-11-03 | End: 2023-11-04

## 2023-11-03 RX ORDER — HYDROMORPHONE HYDROCHLORIDE 1 MG/ML
0.6 INJECTION, SOLUTION INTRAMUSCULAR; INTRAVENOUS; SUBCUTANEOUS EVERY 5 MIN PRN
Status: DISCONTINUED | OUTPATIENT
Start: 2023-11-03 | End: 2023-11-03 | Stop reason: HOSPADM

## 2023-11-03 RX ORDER — ACETAMINOPHEN 325 MG/1
650 TABLET ORAL EVERY 8 HOURS PRN
Status: DISCONTINUED | OUTPATIENT
Start: 2023-11-03 | End: 2023-11-04

## 2023-11-03 RX ORDER — BISACODYL 10 MG
10 SUPPOSITORY, RECTAL RECTAL
Status: DISCONTINUED | OUTPATIENT
Start: 2023-11-03 | End: 2023-11-04

## 2023-11-03 RX ORDER — DIPHENHYDRAMINE HYDROCHLORIDE 50 MG/ML
25 INJECTION INTRAMUSCULAR; INTRAVENOUS ONCE AS NEEDED
Status: ACTIVE | OUTPATIENT
Start: 2023-11-03 | End: 2023-11-03

## 2023-11-03 RX ORDER — FAMOTIDINE 20 MG/1
20 TABLET, FILM COATED ORAL ONCE
Status: COMPLETED | OUTPATIENT
Start: 2023-11-03 | End: 2023-11-03

## 2023-11-03 RX ORDER — ACETAMINOPHEN 325 MG/1
650 TABLET ORAL EVERY 6 HOURS PRN
Status: ACTIVE | OUTPATIENT
Start: 2023-11-03 | End: 2023-11-04

## 2023-11-03 RX ORDER — HYDROCODONE BITARTRATE AND ACETAMINOPHEN 7.5; 325 MG/1; MG/1
1 TABLET ORAL EVERY 6 HOURS PRN
Status: DISPENSED | OUTPATIENT
Start: 2023-11-03 | End: 2023-11-04

## 2023-11-03 RX ORDER — BUPIVACAINE HYDROCHLORIDE 7.5 MG/ML
INJECTION, SOLUTION INTRASPINAL
Status: COMPLETED | OUTPATIENT
Start: 2023-11-03 | End: 2023-11-03

## 2023-11-03 RX ORDER — MIDAZOLAM HYDROCHLORIDE 1 MG/ML
INJECTION INTRAMUSCULAR; INTRAVENOUS AS NEEDED
Status: DISCONTINUED | OUTPATIENT
Start: 2023-11-03 | End: 2023-11-03 | Stop reason: SURG

## 2023-11-03 RX ORDER — NAPROXEN 250 MG/1
250 TABLET ORAL 2 TIMES DAILY WITH MEALS
Status: DISCONTINUED | OUTPATIENT
Start: 2023-11-03 | End: 2023-11-04

## 2023-11-03 RX ORDER — HYDROMORPHONE HYDROCHLORIDE 1 MG/ML
0.2 INJECTION, SOLUTION INTRAMUSCULAR; INTRAVENOUS; SUBCUTANEOUS EVERY 5 MIN PRN
Status: DISCONTINUED | OUTPATIENT
Start: 2023-11-03 | End: 2023-11-03 | Stop reason: HOSPADM

## 2023-11-03 RX ORDER — MORPHINE SULFATE 4 MG/ML
4 INJECTION, SOLUTION INTRAMUSCULAR; INTRAVENOUS EVERY 10 MIN PRN
Status: DISCONTINUED | OUTPATIENT
Start: 2023-11-03 | End: 2023-11-03 | Stop reason: HOSPADM

## 2023-11-03 RX ORDER — MORPHINE SULFATE 4 MG/ML
2 INJECTION, SOLUTION INTRAMUSCULAR; INTRAVENOUS EVERY 10 MIN PRN
Status: DISCONTINUED | OUTPATIENT
Start: 2023-11-03 | End: 2023-11-03 | Stop reason: HOSPADM

## 2023-11-03 RX ORDER — NICOTINE POLACRILEX 4 MG
30 LOZENGE BUCCAL
Status: DISCONTINUED | OUTPATIENT
Start: 2023-11-03 | End: 2023-11-03 | Stop reason: HOSPADM

## 2023-11-03 RX ORDER — ENOXAPARIN SODIUM 100 MG/ML
30 INJECTION SUBCUTANEOUS ONCE
Status: COMPLETED | OUTPATIENT
Start: 2023-11-03 | End: 2023-11-03

## 2023-11-03 RX ORDER — HALOPERIDOL 5 MG/ML
0.5 INJECTION INTRAMUSCULAR ONCE AS NEEDED
Status: ACTIVE | OUTPATIENT
Start: 2023-11-03 | End: 2023-11-03

## 2023-11-03 RX ORDER — GABAPENTIN 300 MG/1
300 CAPSULE ORAL NIGHTLY
Status: DISCONTINUED | OUTPATIENT
Start: 2023-11-03 | End: 2023-11-04

## 2023-11-03 RX ORDER — NICOTINE POLACRILEX 4 MG
15 LOZENGE BUCCAL
Status: DISCONTINUED | OUTPATIENT
Start: 2023-11-03 | End: 2023-11-04

## 2023-11-03 RX ORDER — ACETAMINOPHEN 325 MG/1
650 TABLET ORAL EVERY 8 HOURS PRN
Qty: 60 TABLET | Refills: 0 | Status: SHIPPED | OUTPATIENT
Start: 2023-11-03

## 2023-11-03 RX ORDER — HYDROCODONE BITARTRATE AND ACETAMINOPHEN 7.5; 325 MG/1; MG/1
1 TABLET ORAL EVERY 6 HOURS PRN
Status: DISCONTINUED | OUTPATIENT
Start: 2023-11-03 | End: 2023-11-04

## 2023-11-03 RX ORDER — RUFINAMIDE 40 MG/ML
1 SUSPENSION ORAL DAILY
Status: DISCONTINUED | OUTPATIENT
Start: 2023-11-03 | End: 2023-11-04

## 2023-11-03 RX ORDER — SODIUM CHLORIDE, SODIUM LACTATE, POTASSIUM CHLORIDE, CALCIUM CHLORIDE 600; 310; 30; 20 MG/100ML; MG/100ML; MG/100ML; MG/100ML
INJECTION, SOLUTION INTRAVENOUS CONTINUOUS
Status: DISCONTINUED | OUTPATIENT
Start: 2023-11-03 | End: 2023-11-04

## 2023-11-03 RX ORDER — HYDROCODONE BITARTRATE AND ACETAMINOPHEN 7.5; 325 MG/1; MG/1
2 TABLET ORAL EVERY 6 HOURS PRN
Status: DISPENSED | OUTPATIENT
Start: 2023-11-03 | End: 2023-11-04

## 2023-11-03 RX ORDER — HYDROCODONE BITARTRATE AND ACETAMINOPHEN 7.5; 325 MG/1; MG/1
1 TABLET ORAL EVERY 6 HOURS PRN
Qty: 25 TABLET | Refills: 0 | Status: SHIPPED | OUTPATIENT
Start: 2023-11-03 | End: 2023-11-13

## 2023-11-03 RX ORDER — CEFAZOLIN SODIUM/WATER 2 G/20 ML
2 SYRINGE (ML) INTRAVENOUS EVERY 8 HOURS
Qty: 40 ML | Refills: 0 | Status: COMPLETED | OUTPATIENT
Start: 2023-11-03 | End: 2023-11-04

## 2023-11-03 RX ORDER — MORPHINE SULFATE 10 MG/ML
6 INJECTION, SOLUTION INTRAMUSCULAR; INTRAVENOUS EVERY 10 MIN PRN
Status: DISCONTINUED | OUTPATIENT
Start: 2023-11-03 | End: 2023-11-03 | Stop reason: HOSPADM

## 2023-11-03 RX ORDER — ONDANSETRON 2 MG/ML
4 INJECTION INTRAMUSCULAR; INTRAVENOUS EVERY 6 HOURS PRN
Status: DISCONTINUED | OUTPATIENT
Start: 2023-11-03 | End: 2023-11-04

## 2023-11-03 RX ORDER — HYDROMORPHONE HYDROCHLORIDE 1 MG/ML
0.4 INJECTION, SOLUTION INTRAMUSCULAR; INTRAVENOUS; SUBCUTANEOUS
Status: ACTIVE | OUTPATIENT
Start: 2023-11-03 | End: 2023-11-04

## 2023-11-03 RX ORDER — ACETAMINOPHEN 500 MG
1000 TABLET ORAL ONCE
Status: COMPLETED | OUTPATIENT
Start: 2023-11-03 | End: 2023-11-03

## 2023-11-03 RX ORDER — POLYETHYLENE GLYCOL 3350 17 G/17G
17 POWDER, FOR SOLUTION ORAL DAILY PRN
Status: DISCONTINUED | OUTPATIENT
Start: 2023-11-03 | End: 2023-11-04

## 2023-11-03 RX ORDER — ENEMA 19; 7 G/133ML; G/133ML
1 ENEMA RECTAL ONCE AS NEEDED
Status: DISCONTINUED | OUTPATIENT
Start: 2023-11-03 | End: 2023-11-04

## 2023-11-03 RX ORDER — RUFINAMIDE 40 MG/ML
1 SUSPENSION ORAL DAILY
Qty: 60 TABLET | Refills: 0 | Status: SHIPPED | OUTPATIENT
Start: 2023-11-04

## 2023-11-03 RX ORDER — DOCUSATE SODIUM 100 MG/1
100 CAPSULE, LIQUID FILLED ORAL 2 TIMES DAILY
Status: DISCONTINUED | OUTPATIENT
Start: 2023-11-03 | End: 2023-11-04

## 2023-11-03 RX ORDER — PSEUDOEPHEDRINE HCL 30 MG
100 TABLET ORAL 2 TIMES DAILY
Qty: 20 CAPSULE | Refills: 0 | Status: SHIPPED | OUTPATIENT
Start: 2023-11-03

## 2023-11-03 RX ORDER — PROCHLORPERAZINE EDISYLATE 5 MG/ML
5 INJECTION INTRAMUSCULAR; INTRAVENOUS EVERY 8 HOURS PRN
Status: DISCONTINUED | OUTPATIENT
Start: 2023-11-03 | End: 2023-11-04

## 2023-11-03 RX ORDER — HYDROMORPHONE HYDROCHLORIDE 1 MG/ML
0.6 INJECTION, SOLUTION INTRAMUSCULAR; INTRAVENOUS; SUBCUTANEOUS
Status: ACTIVE | OUTPATIENT
Start: 2023-11-03 | End: 2023-11-04

## 2023-11-03 RX ORDER — DEXTROSE MONOHYDRATE 25 G/50ML
50 INJECTION, SOLUTION INTRAVENOUS
Status: DISCONTINUED | OUTPATIENT
Start: 2023-11-03 | End: 2023-11-03 | Stop reason: HOSPADM

## 2023-11-03 RX ORDER — DEXTROSE MONOHYDRATE 25 G/50ML
50 INJECTION, SOLUTION INTRAVENOUS
Status: DISCONTINUED | OUTPATIENT
Start: 2023-11-03 | End: 2023-11-04

## 2023-11-03 RX ORDER — DIPHENHYDRAMINE HYDROCHLORIDE 50 MG/ML
12.5 INJECTION INTRAMUSCULAR; INTRAVENOUS EVERY 4 HOURS PRN
Status: DISCONTINUED | OUTPATIENT
Start: 2023-11-03 | End: 2023-11-04

## 2023-11-03 RX ORDER — DIPHENHYDRAMINE HYDROCHLORIDE 50 MG/ML
12.5 INJECTION INTRAMUSCULAR; INTRAVENOUS EVERY 4 HOURS PRN
Status: ACTIVE | OUTPATIENT
Start: 2023-11-03 | End: 2023-11-04

## 2023-11-03 RX ORDER — TRANEXAMIC ACID 10 MG/ML
INJECTION, SOLUTION INTRAVENOUS AS NEEDED
Status: DISCONTINUED | OUTPATIENT
Start: 2023-11-03 | End: 2023-11-03 | Stop reason: SURG

## 2023-11-03 RX ORDER — NALOXONE HYDROCHLORIDE 0.4 MG/ML
0.08 INJECTION, SOLUTION INTRAMUSCULAR; INTRAVENOUS; SUBCUTANEOUS
Status: ACTIVE | OUTPATIENT
Start: 2023-11-03 | End: 2023-11-04

## 2023-11-03 RX ORDER — ASPIRIN 325 MG
325 TABLET, DELAYED RELEASE (ENTERIC COATED) ORAL 2 TIMES DAILY
Qty: 60 TABLET | Refills: 0 | Status: SHIPPED | OUTPATIENT
Start: 2023-11-03

## 2023-11-03 RX ORDER — NAPROXEN 250 MG/1
250 TABLET ORAL 2 TIMES DAILY WITH MEALS
Qty: 30 TABLET | Refills: 0 | Status: SHIPPED | OUTPATIENT
Start: 2023-11-03

## 2023-11-03 RX ORDER — LIDOCAINE HYDROCHLORIDE 10 MG/ML
INJECTION, SOLUTION INFILTRATION; PERINEURAL
Status: COMPLETED | OUTPATIENT
Start: 2023-11-03 | End: 2023-11-03

## 2023-11-03 RX ORDER — DIPHENHYDRAMINE HCL 25 MG
25 CAPSULE ORAL EVERY 4 HOURS PRN
Status: DISCONTINUED | OUTPATIENT
Start: 2023-11-03 | End: 2023-11-04

## 2023-11-03 RX ORDER — CALCIUM CARBONATE-CHOLECALCIFEROL TAB 250 MG-125 UNIT 250-125 MG-UNIT
1 TAB ORAL 2 TIMES DAILY WITH MEALS
Qty: 60 TABLET | Refills: 0 | Status: SHIPPED | OUTPATIENT
Start: 2023-11-03

## 2023-11-03 RX ORDER — DIPHENHYDRAMINE HCL 25 MG
25 CAPSULE ORAL EVERY 4 HOURS PRN
Status: DISPENSED | OUTPATIENT
Start: 2023-11-03 | End: 2023-11-04

## 2023-11-03 RX ORDER — NICOTINE POLACRILEX 4 MG
15 LOZENGE BUCCAL
Status: DISCONTINUED | OUTPATIENT
Start: 2023-11-03 | End: 2023-11-03 | Stop reason: HOSPADM

## 2023-11-03 RX ORDER — CALCIUM CARBONATE-CHOLECALCIFEROL TAB 250 MG-125 UNIT 250-125 MG-UNIT
1 TAB ORAL 2 TIMES DAILY WITH MEALS
Status: DISCONTINUED | OUTPATIENT
Start: 2023-11-03 | End: 2023-11-04

## 2023-11-03 RX ORDER — HYDROMORPHONE HYDROCHLORIDE 1 MG/ML
0.4 INJECTION, SOLUTION INTRAMUSCULAR; INTRAVENOUS; SUBCUTANEOUS EVERY 5 MIN PRN
Status: DISCONTINUED | OUTPATIENT
Start: 2023-11-03 | End: 2023-11-03 | Stop reason: HOSPADM

## 2023-11-03 RX ORDER — PROCHLORPERAZINE EDISYLATE 5 MG/ML
5 INJECTION INTRAMUSCULAR; INTRAVENOUS ONCE AS NEEDED
Status: ACTIVE | OUTPATIENT
Start: 2023-11-03 | End: 2023-11-03

## 2023-11-03 RX ORDER — ASPIRIN 325 MG
325 TABLET, DELAYED RELEASE (ENTERIC COATED) ORAL 2 TIMES DAILY
Status: DISCONTINUED | OUTPATIENT
Start: 2023-11-03 | End: 2023-11-04

## 2023-11-03 RX ORDER — ONDANSETRON 2 MG/ML
4 INJECTION INTRAMUSCULAR; INTRAVENOUS ONCE AS NEEDED
Status: ACTIVE | OUTPATIENT
Start: 2023-11-03 | End: 2023-11-03

## 2023-11-03 RX ORDER — SODIUM CHLORIDE, SODIUM LACTATE, POTASSIUM CHLORIDE, CALCIUM CHLORIDE 600; 310; 30; 20 MG/100ML; MG/100ML; MG/100ML; MG/100ML
INJECTION, SOLUTION INTRAVENOUS CONTINUOUS
Status: DISCONTINUED | OUTPATIENT
Start: 2023-11-03 | End: 2023-11-03 | Stop reason: HOSPADM

## 2023-11-03 RX ORDER — SENNOSIDES 8.6 MG
17.2 TABLET ORAL NIGHTLY
Status: DISCONTINUED | OUTPATIENT
Start: 2023-11-03 | End: 2023-11-04

## 2023-11-03 RX ORDER — CEFAZOLIN SODIUM/WATER 2 G/20 ML
2 SYRINGE (ML) INTRAVENOUS ONCE
Status: COMPLETED | OUTPATIENT
Start: 2023-11-03 | End: 2023-11-03

## 2023-11-03 RX ORDER — ONDANSETRON 2 MG/ML
INJECTION INTRAMUSCULAR; INTRAVENOUS AS NEEDED
Status: DISCONTINUED | OUTPATIENT
Start: 2023-11-03 | End: 2023-11-03 | Stop reason: SURG

## 2023-11-03 RX ORDER — PROCHLORPERAZINE EDISYLATE 5 MG/ML
5 INJECTION INTRAMUSCULAR; INTRAVENOUS EVERY 8 HOURS PRN
Status: DISCONTINUED | OUTPATIENT
Start: 2023-11-03 | End: 2023-11-03 | Stop reason: HOSPADM

## 2023-11-03 RX ORDER — GLIPIZIDE 5 MG/1
2.5 TABLET ORAL
Status: DISCONTINUED | OUTPATIENT
Start: 2023-11-04 | End: 2023-11-04

## 2023-11-03 RX ORDER — MORPHINE SULFATE 1 MG/ML
INJECTION, SOLUTION EPIDURAL; INTRATHECAL; INTRAVENOUS
Status: COMPLETED | OUTPATIENT
Start: 2023-11-03 | End: 2023-11-03

## 2023-11-03 RX ORDER — NALOXONE HYDROCHLORIDE 0.4 MG/ML
80 INJECTION, SOLUTION INTRAMUSCULAR; INTRAVENOUS; SUBCUTANEOUS AS NEEDED
Status: DISCONTINUED | OUTPATIENT
Start: 2023-11-03 | End: 2023-11-03 | Stop reason: HOSPADM

## 2023-11-03 RX ORDER — ONDANSETRON 2 MG/ML
4 INJECTION INTRAMUSCULAR; INTRAVENOUS EVERY 6 HOURS PRN
Status: DISCONTINUED | OUTPATIENT
Start: 2023-11-03 | End: 2023-11-03 | Stop reason: HOSPADM

## 2023-11-03 RX ORDER — METOCLOPRAMIDE 10 MG/1
10 TABLET ORAL ONCE
Status: COMPLETED | OUTPATIENT
Start: 2023-11-03 | End: 2023-11-03

## 2023-11-03 RX ADMIN — ONDANSETRON 4 MG: 2 INJECTION INTRAMUSCULAR; INTRAVENOUS at 10:45:00

## 2023-11-03 RX ADMIN — CEFAZOLIN SODIUM/WATER 2 G: 2 G/20 ML SYRINGE (ML) INTRAVENOUS at 08:50:00

## 2023-11-03 RX ADMIN — MORPHINE SULFATE 0.3 MG: 1 INJECTION, SOLUTION EPIDURAL; INTRATHECAL; INTRAVENOUS at 08:43:00

## 2023-11-03 RX ADMIN — SODIUM CHLORIDE, SODIUM LACTATE, POTASSIUM CHLORIDE, CALCIUM CHLORIDE: 600; 310; 30; 20 INJECTION, SOLUTION INTRAVENOUS at 10:55:00

## 2023-11-03 RX ADMIN — LIDOCAINE HYDROCHLORIDE 5 ML: 10 INJECTION, SOLUTION INFILTRATION; PERINEURAL at 08:43:00

## 2023-11-03 RX ADMIN — TRANEXAMIC ACID 1000 MG: 10 INJECTION, SOLUTION INTRAVENOUS at 08:55:00

## 2023-11-03 RX ADMIN — MIDAZOLAM HYDROCHLORIDE 2 MG: 1 INJECTION INTRAMUSCULAR; INTRAVENOUS at 08:41:00

## 2023-11-03 RX ADMIN — BUPIVACAINE HYDROCHLORIDE 1.5 ML: 7.5 INJECTION, SOLUTION INTRASPINAL at 08:43:00

## 2023-11-03 RX ADMIN — TRANEXAMIC ACID 1000 MG: 10 INJECTION, SOLUTION INTRAVENOUS at 10:23:00

## 2023-11-03 NOTE — CM/SW NOTE
CM requested department  [facility]) to initiate [address] referral for [facility]. F2F entered. FABIO/ALYSIA will continue to follow. Blas Frausto.  Opal Vasquez RN, BSN  Nurse   [phone]

## 2023-11-03 NOTE — DISCHARGE INSTRUCTIONS
Work on bending and straightening your knee often. Pump both feet often for the next 2 weeks to try to prevent blood clot. When not walking, elevate right leg. Wear YURIY hose during the day and evening but not to sleep. Remove the bandage on postop day #3, 11/6/2023. Use Q-tips and paint the wound/staples with Betadine. Never double dip the Q-tip. Use a clean Q-tip if more than 1 is necessary. Otherwise keep wound dry until follow-up. Sponge baths may be necessary. Read the medicine reconciliation sheet so you take the medications properly. The aspirin is your blood thinner and must be taken. Follow-up with Dr. Yury Webb and 10 to 14 days for staple removal.    Sometimes managing your health at home requires assistance. The [facility] team has recognized your preference to use [facility] [place], Phone: [phone]. A representative from the home health agency will contact you or your family to schedule your first visit.

## 2023-11-03 NOTE — INTERVAL H&P NOTE
Pre-op Diagnosis: Primary osteoarthritis of left knee [M17.12]  Class 2 severe obesity due to excess calories with serious comorbidity and body mass index (BMI) of 35.0 to 35.9 in adult  [E66.01, Z68.35]    The above referenced H&P was reviewed by Kerri Pryor MD on 11/3/2023, the patient was examined and no significant changes have occurred in the patient's condition since the H&P was performed. I discussed with the patient and/or legal representative the potential benefits, risks and side effects of this procedure; the likelihood of the patient achieving goals; and potential problems that might occur during recuperation. I discussed reasonable alternatives to the procedure, including risks, benefits and side effects related to the alternatives and risks related to not receiving this procedure. We will proceed with procedure as planned. GFR September 2023 = 101. Denies diabetic neuropathy. For left knee replacement.

## 2023-11-03 NOTE — PHYSICAL THERAPY NOTE
PHYSICAL THERAPY KNEE EVALUATION - INPATIENT       Room Number: 434/434-A  Evaluation Date: 11/3/2023  Type of Evaluation: Initial  Physician Order: PT Eval and Treat    Presenting Problem: s/p left TKA 11/3/23     Reason for Therapy: Mobility Dysfunction and Discharge Planning    PHYSICAL THERAPY ASSESSMENT     Patient is a 62year old female admitted 11/3/2023 for elective left TKA. Patient's current functional deficits include impaired bed mobility, transfers, ambulation and stair negotiation, which are below the patient's pre-admission status. Patient will benefit from continued inpatient physical therapy to address above issues so that patient may achieve highest functional mobility level. Pt ok to see per rn. Pt recd in supine, daughter present. Pt requesting her daughter translate as her primary language is [facility] [place] Pt educated in role of PT,  ankle pumps for DVT prevention, goals for session. Pt demonstrating quality left quad set, ability to performed slr and maintain left knee extension, instructed and and performed therex per TKA protocol, issued written HEP. Pt transferred supine to sit with min a, good sitting balance, no dizziness, sitting bp 120/69. Pt provided verbal instruction and demonstration of rw use, stood to rw with min a. Pt able to perform marching in place with no left knee buckling, instructed in gait sequencing. Pt amb with rw with gait training emphasis on heel toe pattern, upright posture. Pt tolerated ambulation well, able to advance to step through gait pattern. Pt with one episode of emesis while ambulating,  returned to bs chair, educated about POC, discussed dc recommendations. Pt educated in importance of achieving ROM in a timely fashion. Discussed session with RN, discussed assist need with PCT.      THERAPEUTIC EXERCISES  Lower Extremity Ankle pumps  Heel slides  LAQ  Quad sets  left knee flexion, sitting     Position Sitting and Supine         The patient's Approx Degree of Impairment: 46.58% has been calculated based on documentation in the [facility] '6 clicks' Inpatient Basic Mobility Short Form. Research supports that patients with this level of impairment may benefit from [address] [address] , family assist.      DISCHARGE RECOMMENDATIONS  PT Discharge Recommendations: Home with home health PT; Intermittent Supervision    PLAN  PT Treatment Plan: Endurance; Energy conservation;Patient education; Family education;Gait training;Range of motion;Strengthening;Stair training  Rehab Potential : Good  Frequency (Obs): BID       PHYSICAL THERAPY MEDICAL/SOCIAL HISTORY        Problem List  Principal Problem:    Primary osteoarthritis of left knee  Active Problems:    Diabetes mellitus, type 2 (HCC)    Class 1 obesity due to excess calories with serious comorbidity and body mass index (BMI) of 34.0 to 34.9 in adult      HOME SITUATION  Home Situation  Type of Home: House  Home Layout: One level  Stairs to Enter : 6  Stairs to [employer]: 0  Lives With: Daughter  Patient Owned Equipment: Rolling walker  Patient Regularly Uses: None     Prior Level of [place]: Pt reports ind pta, did not use assistive device. Pt lives with supportive daughter who will be able to assist as needed upon edw dc. PHYSICAL THERAPY EXAMINATION     OBJECTIVE  Precautions:  needed (Sudanese)  Fall Risk: Standard fall risk    WEIGHT BEARING RESTRICTION           L Lower Extremity: Weight Bearing as Tolerated    PAIN ASSESSMENT  Ratin  Location: left knee  Management Techniques:  Activity promotion    COGNITION  Overall Cognitive Status:  WFL - within functional limits    RANGE OF MOTION AND STRENGTH ASSESSMENT  Upper extremity ROM and strength are within functional limits   Lower extremity ROM is within functional limits except left knee  Lower extremity strength is within functional limits except left LE    BALANCE  Static Sitting: Good  Dynamic Sitting: Good  Static Standing: Poor +  Dynamic Standing: Poor +                                                                         ACTIVITY TOLERANCE  Pulse: 63  Heart Rate Source: Monitor     BP: 120/69  BP Location: Right arm  BP Method: Automatic  Patient Position: Sitting    O2 WALK  Oxygen Therapy  SPO2% on Room Air at Rest: 95    AM-PAC '6-Clicks' INPATIENT SHORT FORM - BASIC MOBILITY  How much difficulty does the patient currently have. .. Patient Difficulty: Turning over in bed (including adjusting bedclothes, sheets and blankets)?: A Little   Patient Difficulty: Sitting down on and standing up from a chair with arms (e.g., wheelchair, bedside commode, etc.): A Little   Patient Difficulty: Moving from lying on back to sitting on the side of the bed?: A Little   How much help from another person does the patient currently need. .. Help from Another: Moving to and from a bed to a chair (including a wheelchair)?: A Little   Help from Another: Need to walk in hospital room?: A Little   Help from Another: Climbing 3-5 steps with a railing?: A Little     AM-PAC Score:  Raw Score: 18   Approx Degree of Impairment: 46.58%   Standardized Score (AM-PAC Scale): 43.63   CMS Modifier (G-Code): CK    FUNCTIONAL ABILITY STATUS  Functional Mobility/Gait Assessment  Gait Assistance: Minimum assistance  Distance (ft): 60  Assistive Device: Rolling walker  Pattern: L Decreased stance time      Exercise/Education Provided:  Bed mobility  Energy conservation  Functional activity tolerated  Gait training  Posture  ROM  Strengthening    Patient End of Session: Up in chair;Needs met;Call light within reach;RN aware of session/findings; All patient questions and concerns addressed; Ice applied; Alarm set; Family present    CURRENT GOALS    Goals to be met by: 11/4/23  Patient Goal Patient's self-stated goal is: to go home with home PT   Goal #1 Patient is able to demonstrate supine - sit EOB @ level: modified independent     Goal #1   Current Status    Goal #2 Patient is able to demonstrate transfers Sit to/from Stand at assistance level: supervision     Goal #2  Current Status    Goal #3 Patient is able to ambulate 150 feet with assistive device at assistance level: supervision   Goal #3   Current Status    Goal #4 Patient will negotiate 4 stairs/one curb w/ assistive device and supervision   Goal #4   Current Status    Goal #5  AROM 0 degrees extension to 95 degrees flexion     Goal #5   Current Status    Goal #6 Patient independently performs home exercise program for ROM/strengthening per the instructions provided in preparation for discharge.    Goal #6  Current Status        Patient Evaluation Complexity Level:  History Low - no personal factors and/or co-morbidities   Examination of body systems Low - addressing 1-2 elements   Clinical Presentation Low - Stable   Clinical Decision Making Low Complexity     Gait Training: 15 minutes  Therapeutic Activity: 15 minutes

## 2023-11-03 NOTE — CM/SW NOTE
11/03/23 1600   / Referral Data   Referral Source Physician   Reason for Referral Discharge planning   Informant Patient;Daughter   Medical Hx   Does patient have an established PCP? Yes   Patient Info   Patient's Current Mental Status at Time of Assessment Alert;Oriented;Memory Impairments   Patient Communication Issues Language barrier  (Serbian)   Patient's [address]   Patient lives with Daughter   Patient Status Prior to Admission   Independent with ADLs and Mobility Yes   Discharge Needs   Anticipated D/C needs Home health care   Choice of Post-Acute Provider   Informed patient of right to choose their preferred provider Yes     Pt discussed during nursing rounds. Dx left knee OA s/p TKA. Home w/daughter, independent w/o device at baseline. PT recommending HH w/PT at dc, pt and dtr agreeable to recommendation. [facility] referrals sent by partnering . List of accepting [facility] agencies provided to pt and daughter at bedside. [phone] [facility] is chosen agency at [facility]. Agency reserved in [address] and notified of probable dc over the weekend. Plan: Home w/daughter with [facility] pending medical clearance. / to remain available for support and/or discharge planning.      DENNY Augustine    [phone]

## 2023-11-03 NOTE — CM/SW NOTE
Department  notified of request for [facility], aidin referrals started. Assigned CM/SW to follow up with pt/family on further discharge planning.        Marianna IBARRA [facility]

## 2023-11-03 NOTE — OPERATIVE REPORT
[facility]    PATIENT'S NAME: Fabiano Pérez   ATTENDING PHYSICIAN: [address] M. Garnette Councilman, MD   OPERATING PHYSICIAN: Nickolas Posey Garnette Councilman, MD   PATIENT ACCOUNT#:   [id number]    LOCATION:  [address] [place] [address] [address] RECORD #:   [id number]       YOB: 1965  ADMISSION DATE:       11/03/2023      OPERATION DATE:  11/03/2023    OPERATIVE REPORT      PREOPERATIVE DIAGNOSIS:    1.   Osteoarthritis, left knee. 2.   Obesity with BMI 34.69. POSTOPERATIVE DIAGNOSIS:    1.   Osteoarthritis, left knee. 2.   Obesity with BMI 34.69. PROCEDURE:  Left total knee arthroplasty with MRI templated patient-specific instruments. ASSISTANT:  SHEY Salas and May Ruiz. ANESTHESIA:  Dr. Ayleen Rich, with spinal using Duramorph/MAC. INDICATIONS:  Patient is a 51-year-old woman with the above diagnoses documented by physical exam, x-ray, and MRI. She has failed nonoperative treatment. The risks and complications of surgery were discussed with no guarantees given. The consent was signed. Due to her obesity code with a BMI of 34.69, MRI templating and 2 surgical assistants were all medically necessary. She carried a lot of weight in her knee. MRI templating allowed us to properly align the components without relying on the standard instrumentation which would have been less reliable in a larger person. Furthermore, the 2 surgical assistants were mostly needed for the proper retraction during the case in a larger person. The assistants also helped provide safe transfer to the operative table, prep and drape, the retraction as above, help with deep and superficial closure, dressings, and safe transfer to the recovery room. Without MRI templating and 2 surgical assistants, the surgery would have been far more difficult and less safe and therefore, they were all medically necessary. OPERATIVE TECHNIQUE:  Patient was brought to the operating room and placed in supine position.   Appropriate IV access and monitors were placed. Ancef 2 g IV and tranexamic acid 1 g IV were both given as prophylaxis at the appropriate interval.  Spinal with Duramorph followed by monitored sedation were applied by Dr. Yelena Alexis. A tourniquet was placed on the upper left thigh and an SCD boot on the right leg. Left lower extremity was then prepped and draped in sterile fashion with Betadine followed by ChloraPrep. The leg was exsanguinated, and the tourniquet inflated to 300 mmHg. Tourniquet time for the case was 68 minutes. Incision was made and a midvastus snip was used to gain access to the left knee. Tricompartmental osteoarthritis was seen with mild effusion. Much of the fat pad was excised. The patella was resected from 22 mm to 12 mm. A 38 mm patella fit optimally and the 3 drill holes placed. A protector was placed, and the patella tucked in the lateral gutter. Cruciate ligaments and anterior menisci were excised. The patient-specific guide fit well on the distal femur, and the distal femoral cuts were made as templated. The knee was flexed further, and the size 7 cutting block was applied and seen to be properly externally rotated. Cuts were made as templated without notching in the anterior femur. Medial osteophyte was removed. The rest of the menisci were excised, and the patient-specific guide was set on the proximal tibia and seen to fit well. The drop dayna was in line with the anterior tibial spine with proper posterior slope. Cuts were made as templated medially and laterally. Some small osteophyte was removed off the medial tibial plateau, and we downsized the tibia to a size D. Spacer block showed good balance in flexion and extension, as well as with the collateral ligaments. LPS notch was cut for the femur. Trial reduction was done. She required a narrow femur. With a 14 mm spacer, she had true full extension and good deep flexion without liftoff.   The patella was tracking well, and there was good balance of the collateral ligaments at 0 degrees, 40 degrees, and 90 degrees. The tibia was nailed in its proper rotation. The pegs were drilled for the femur and the stem prepared for the tibia. Two batches of Ramírez methylmethacrylate cement with gentamicin premixed were prepared in a vacuum container. The patient has diabetes. The bone surface was cleansed thoroughly with saline using Pulsavac lavage and dried thoroughly. Cement was pressurized into the bone and also placed on the back of the components. Tibia, followed by femur, followed by patella were cemented in standard fashion. An assistant held axial pressure with a spacer in place while cement was hardening and excess cement was removed. During the last 4 minutes of cement drying, a dilute Betadine solution was in the knee. This was evacuated with saline after the cement hardened. The knee was inspected for any excess cement in the notch, as well as posteriorly in addition at the medial and lateral sides. Once cement was removed, trial reduction confirmed a 14 mm spacer fit optimally. The actual polyethylene was a Ramírez Persona LPS Vivacit-E 14 mm and snapped into position without soft tissue interposed. Again, excellent motion, balance, and tracking were noted. The tourniquet was let down. No significant bleeding was noted. The knee was closed in flexion in layers with staples for the skin and silver Mepilex dressing. Sterile cotton dressings were applied, followed by Ace wrap, ice packs. The patient was taken out of sedation and brought to the recovery room in stable condition. The knee had been injected after capsule closure with 80 mL of joint cocktail for postop pain relief. A second tranexamic acid 1 g IV was given at the end of the case after the tourniquet was let down. Blood loss was 50 mL. The specimen were the bone cuts to Pathology.   There were no drains, no complications, and the patient tolerated the procedure well. Dictated By Liyah Clayton. Tamika Perez MD  d: 11/03/2023 10:55:13  t: 11/03/2023 18:03:19  Job [id number]  [facility]/    cc:  Charlette Contreras MD

## 2023-11-03 NOTE — CONSULTS
[facility]    PATIENT'S NAME: Shirin Osorio   ATTENDING PHYSICIAN: [address] MKevin Coker MD   CONSULTING PHYSICIAN: Mp Lou MD   PATIENT ACCOUNT#:   [id number]    LOCATION:  [facility] [address] RECORD #:   [id number]       YOB: 1965  ADMISSION DATE:       11/03/2023      CONSULT DATE:  11/03/2023    REPORT OF CONSULTATION      REASON FOR ADMISSION:  Advanced primary osteoarthritis of left knee, for left total knee arthroplasty. HISTORY OF PRESENT ILLNESS:  The patient is a 60-year-old  female with chronic left knee pain that is debilitating to her gait, has exhausted outpatient conservative options of treatment, was evaluated by Orthopedic Surgery, Dr. Mary Anne Guajardo, and scheduled today for left total knee arthroplasty. Preoperatively she had epidural spinal block. Postoperatively transferred to PACU for further monitoring. PAST MEDICAL HISTORY:  Diabetes mellitus type 2, osteoarthritis, gastroesophageal reflux disease. PAST SURGICAL HISTORY:  Cholecystectomy, hysterectomy, tubal ligation, cystoscopy, and lithotripsy. MEDICATIONS:  Please see medication reconciliation list.      ALLERGIES:  No known drug allergies. FAMILY HISTORY:  Positive for diabetes mellitus type 2. SOCIAL HISTORY:  No tobacco, alcohol, or drug use. Lives with her family. Independent for basic activities of daily living. REVIEW OF SYSTEMS:  Currently laying in bed comfortably. No left knee pain. No chest pain. No shortness of breath. Other 12-point review of systems negative. PHYSICAL EXAMINATION:    GENERAL:  Alert. Oriented to time, place, and person. No acute distress. VITAL SIGNS:  Temperature 98.4, pulse 63, respiratory rate 14, blood pressure 93/65, pulse oximetry 98% on 2L nasal cannula oxygen. HEENT:  Atraumatic. Oropharynx clear. Moist mucous membranes. Ears, nose normal.  Eyes:  Anicteric sclerae. NECK:  Supple. No lymphadenopathy.   Trachea midline. Full range of motion. LUNGS:  Clear to auscultation bilaterally. Normal respiratory effort. HEART:  Regular rate, rhythm. S1 and S2 auscultated. No murmur. ABDOMEN:  Soft, nondistended. No tenderness. Positive bowel sounds. EXTREMITIES:  No edema, clubbing, or cyanosis. Left knee dressing. NEUROLOGIC:  Motor and sensory intact. Decreased sensation and muscle movement of both lower extremities, expected post epidural block. ASSESSMENT AND PLAN:    1. Primary osteoarthritis of left knee, status post left total arthroplasty. Spinal block. Pain control. Neurovascular checks. Aspirin for DVT prophylaxis. Physical and occupational therapy. 2.   Diabetes mellitus type 2, non-insulin dependent. Continue home medications. Monitor Accu-Cheks. Sliding scale insulin.      Dictated By Hayden Hernandez MD  d: 11/03/2023 11:28:50  t: 11/03/2023 12:43:27  Job [id number]  [facility]/

## 2023-11-03 NOTE — PLAN OF CARE
Patient has been ambulating 1 assist w/ walker. Currently has no complaints of pain. Has been nauseated and had an episode of emesis. Zofran IV given. Is a check void this afternoon. Has aspirin, TEDs  and SCDs while in bed for dvt prophylaxis. Plan is for pt to dc home w/ [facility].     Problem: Patient Centered Care  Goal: Patient preferences are identified and integrated in the patient's plan of care  Description: Interventions:  - What would you like us to know as we care for you?  I live with my daughter   - Provide timely, complete, and accurate information to patient/family  - Incorporate patient and family knowledge, values, beliefs, and cultural backgrounds into the planning and delivery of care  - Encourage patient/family to participate in care and decision-making at the level they choose  - Honor patient and family perspectives and choices  Outcome: Progressing     Problem: Patient/Family Goals  Goal: Patient/Family Long Term Goal  Description: Patient's Long Term Goal: To ambulate w/ pain level <5    Interventions:  - Pain medications  - ambulation  - walker   - See additional Care Plan goals for specific interventions  Outcome: Progressing  Goal: Patient/Family Short Term Goal  Description: Patient's Short Term Goal: to go home    Interventions:   - follow plan of care  - See additional Care Plan goals for specific interventions  Outcome: Progressing     Problem: PAIN - ADULT  Goal: Verbalizes/displays adequate comfort level or patient's stated pain goal  Description: INTERVENTIONS:  - Encourage pt to monitor pain and request assistance  - Assess pain using appropriate pain scale  - Administer analgesics based on type and severity of pain and evaluate response  - Implement non-pharmacological measures as appropriate and evaluate response  - Consider cultural and social influences on pain and pain management  - Manage/alleviate anxiety  - Utilize distraction and/or relaxation techniques  - Monitor for opioid side effects  - Notify MD/LIP if interventions unsuccessful or patient reports new pain  - Anticipate increased pain with activity and pre-medicate as appropriate  Outcome: Progressing     Problem: RISK FOR INFECTION - ADULT  Goal: Absence of fever/infection during anticipated neutropenic period  Description: INTERVENTIONS  - Monitor WBC  - Administer growth factors as ordered  - Implement neutropenic guidelines  Outcome: Progressing     Problem: SAFETY ADULT - FALL  Goal: Free from fall injury  Description: INTERVENTIONS:  - Assess pt frequently for physical needs  - Identify cognitive and physical deficits and behaviors that affect risk of falls.   - [place] fall precautions as indicated by assessment.  - Educate pt/family on patient safety including physical limitations  - Instruct pt to call for assistance with activity based on assessment  - Modify environment to reduce risk of injury  - Provide assistive devices as appropriate  - Consider OT/PT consult to assist with strengthening/mobility  - Encourage toileting schedule  Outcome: Progressing     Problem: DISCHARGE PLANNING  Goal: Discharge to home or other facility with appropriate resources  Description: INTERVENTIONS:  - Identify barriers to discharge w/pt and caregiver  - Include patient/family/discharge partner in discharge planning  - Arrange for needed discharge resources and transportation as appropriate  - Identify discharge learning needs (meds, wound care, etc)  - Arrange for interpreters to assist at discharge as needed  - Consider post-discharge preferences of patient/family/discharge partner  - Complete POLST form as appropriate  - Assess patient's ability to be responsible for managing their own health  - Refer to Case Management Department for coordinating discharge planning if the patient needs post-hospital services based on physician/LIP order or complex needs related to functional status, cognitive ability or social support system  Outcome: Progressing

## 2023-11-03 NOTE — ANESTHESIA PROCEDURE NOTES
Spinal Block    Date/Time: 11/3/2023 8:43 AM    Performed by: Kaley Samaniego CRNA  Authorized by: Garland Room, MD      General Information and Staff    Start Time:  11/3/2023 8:43 AM  End Time:  11/3/2023 8:48 AM  CRNA:  Kaley Samaniego CRNA  Performed by:  CRNA  Patient Location:  OR  Site identification: surface landmarks    Reason for Block: at surgeon's request, post-op pain management and surgical anesthesia    Preanesthetic Checklist: patient identified, IV checked, risks and benefits discussed, monitors and equipment checked, pre-op evaluation, timeout performed, anesthesia consent and sterile technique used      Procedure Details    Patient Position:  Sitting  Prep: ChloraPrep    Monitoring:  Cardiac monitor  Approach:  Midline  Location:  L3-4  Injection Technique:  Single-shot    Needle    Needle Type:  Pencil-tip  Needle Gauge:  24 G  Needle Length:  3.5 in    Assessment    Sensory Level:  T6  Events: clear CSF, CSF aspirated, well tolerated and blood negative      Additional Comments

## 2023-11-03 NOTE — BRIEF OP NOTE
Pre-Operative Diagnosis: Primary osteoarthritis of left knee [M17.12]  Class 2 severe obesity due to excess calories with serious comorbidity and body mass index (BMI) of 34.0 to 34.9 in adult  [E66.01, Z68.35]     Post-Operative Diagnosis: Primary osteoarthritis of left knee [M17.12]Class 2 severe obesity due to excess calories with serious comorbidity and body mass index (BMI) of 34.0 to 34.9 in adult [E66.01, Z68.35]      Procedure Performed:   Left total knee arthroplasty, with MRI templated instruments    Surgeon(s) and Role:     * Ashley Mullins MD - Primary    Assistant(s):  Surgical Assistant.: Rosalia Azul    Anesthesia: Dr. Trice Overton with spinal using Duramorph/MAC     Surgical Findings: Ramírez persona LPS femur 7 left narrow, tibia D left with short stem extension, patella 38 x 9.5 mm, polyethylene 14 mm LPS Vivacit-E. Tourniquet: 68 minutes at 300 mmHg. Specimen: Bone cuts to pathology  Drain: None  Estimated Blood Loss: 50 cc  Complications: None  Stable to PACU. Tranexamic acid 1 g IV second dose given in OR at end of case.     Naye Medeiros MD  11/3/2023  10:38 AM

## 2023-11-04 VITALS
BODY MASS INDEX: 34.85 KG/M2 | TEMPERATURE: 99 F | HEART RATE: 83 BPM | RESPIRATION RATE: 16 BRPM | SYSTOLIC BLOOD PRESSURE: 141 MMHG | WEIGHT: 166 LBS | HEIGHT: 58 IN | DIASTOLIC BLOOD PRESSURE: 81 MMHG | OXYGEN SATURATION: 97 %

## 2023-11-04 LAB
ANION GAP SERPL CALC-SCNC: 6 MMOL/L (ref 0–18)
BUN BLD-MCNC: <5 MG/DL (ref 9–23)
CALCIUM BLD-MCNC: 9 MG/DL (ref 8.7–10.4)
CHLORIDE SERPL-SCNC: 102 MMOL/L (ref 98–112)
CO2 SERPL-SCNC: 28 MMOL/L (ref 21–32)
CREAT BLD-MCNC: 0.51 MG/DL
EGFRCR SERPLBLD CKD-EPI 2021: 108 ML/MIN/1.73M2 (ref 60–?)
GLUCOSE BLD-MCNC: 109 MG/DL (ref 70–99)
GLUCOSE BLDC GLUCOMTR-MCNC: 108 MG/DL (ref 70–99)
HCT VFR BLD AUTO: 35.7 %
HGB BLD-MCNC: 11.9 G/DL
POTASSIUM SERPL-SCNC: 3.8 MMOL/L (ref 3.5–5.1)
SODIUM SERPL-SCNC: 136 MMOL/L (ref 136–145)

## 2023-11-04 PROCEDURE — 99214 OFFICE O/P EST MOD 30 MIN: CPT | Performed by: HOSPITALIST

## 2023-11-04 NOTE — PLAN OF CARE
Post-op day #1. Dressing in place to left knee. Monitoring vital signs- stable at this time. No acute changes noted throughout shift. Receiving IV fluids per MD order. Monitoring blood glucose levels per order. With intermittent nausea but improving. Voiding freely, up to bathroom. SCDs and ASA for DVT prophylaxis. Norco provided as needed for pain. Up with standby assist and a walker. Encouraged frequent use of incentive spirometer. Fall precautions maintained- bed alarm on, bed at lowest position, call light and personal belongings within reach, non-skid socks in place to bilateral feet. Frequent rounding by nursing staff. Plan for home with [facility]. Problem: Patient Centered Care  Goal: Patient preferences are identified and integrated in the patient's plan of care  Description: Interventions:  - What would you like us to know as we care for you?  I live at home with my  and daughter.   - Provide timely, complete, and accurate information to patient/family  - Incorporate patient and family knowledge, values, beliefs, and cultural backgrounds into the planning and delivery of care  - Encourage patient/family to participate in care and decision-making at the level they choose  - Honor patient and family perspectives and choices  Outcome: Progressing     Problem: Patient/Family Goals  Goal: Patient/Family Long Term Goal  Description: Patient's Long Term Goal: no complications    Interventions:  - follow up with MD  -pain control  -pt/ot  -fall precautions  -monitor incision for s/sx of infection    - See additional Care Plan goals for specific interventions  Outcome: Progressing  Goal: Patient/Family Short Term Goal  Description: Patient's Short Term Goal: pain control    Interventions:   - pain meds   Reposition, rest, ice    - See additional Care Plan goals for specific interventions  Outcome: Progressing     Problem: PAIN - ADULT  Goal: Verbalizes/displays adequate comfort level or patient's stated pain goal  Description: INTERVENTIONS:  - Encourage pt to monitor pain and request assistance  - Assess pain using appropriate pain scale  - Administer analgesics based on type and severity of pain and evaluate response  - Implement non-pharmacological measures as appropriate and evaluate response  - Consider cultural and social influences on pain and pain management  - Manage/alleviate anxiety  - Utilize distraction and/or relaxation techniques  - Monitor for opioid side effects  - Notify MD/LIP if interventions unsuccessful or patient reports new pain  - Anticipate increased pain with activity and pre-medicate as appropriate  Outcome: Progressing     Problem: RISK FOR INFECTION - ADULT  Goal: Absence of fever/infection during anticipated neutropenic period  Description: INTERVENTIONS  - Monitor WBC  - Administer growth factors as ordered  - Implement neutropenic guidelines  Outcome: Progressing     Problem: SAFETY ADULT - FALL  Goal: Free from fall injury  Description: INTERVENTIONS:  - Assess pt frequently for physical needs  - Identify cognitive and physical deficits and behaviors that affect risk of falls.   - [place] fall precautions as indicated by assessment.  - Educate pt/family on patient safety including physical limitations  - Instruct pt to call for assistance with activity based on assessment  - Modify environment to reduce risk of injury  - Provide assistive devices as appropriate  - Consider OT/PT consult to assist with strengthening/mobility  - Encourage toileting schedule  Outcome: Progressing     Problem: DISCHARGE PLANNING  Goal: Discharge to home or other facility with appropriate resources  Description: INTERVENTIONS:  - Identify barriers to discharge w/pt and caregiver  - Include patient/family/discharge partner in discharge planning  - Arrange for needed discharge resources and transportation as appropriate  - Identify discharge learning needs (meds, wound care, etc)  - Arrange for interpreters to assist at discharge as needed  - Consider post-discharge preferences of patient/family/discharge partner  - Complete POLST form as appropriate  - Assess patient's ability to be responsible for managing their own health  - Refer to Case Management Department for coordinating discharge planning if the patient needs post-hospital services based on physician/LIP order or complex needs related to functional status, cognitive ability or social support system  Outcome: Progressing

## 2023-11-04 NOTE — DISCHARGE SUMMARY
Date of admission: 11/3/2023  Date of discharge: 11/4/2023    Primary admitting diagnosis: Left knee osteoarthritis  Primary discharge diagnosis: Left knee osteoarthritis    Hospital course: Patient was admitted and had her left knee replacement electively. Postoperatively she was started on aspirin anticoagulation as well as physical and Occupational Therapy. She passed physical therapy on postop day #1 for safe discharge home. Prescription for outpatient physical therapy is and home health were also arranged concurrently depending on her insurance coverage for home health. She was eating, voiding, and her pain was easily controlled oral medication. I demonstrated self rehab to her, her son, and daughter-in-law. We discussed foot pumps, wound care and the medication list.  I instructed them to also read the patient instruction sheet and the medicine reconciliation sheet. She was discharged home on postop day #1 in good condition. Her discharge medications were per the medicine reconciliation sheet. Discharge kidney GFR was 108. Despite her diabetes, I felt she was safe to use aspirin anticoagulation for 1 month.

## 2023-11-04 NOTE — PLAN OF CARE
Problem: PAIN - ADULT  Goal: Verbalizes/displays adequate comfort level or patient's stated pain goal  Description: INTERVENTIONS:  - Encourage pt to monitor pain and request assistance  - Assess pain using appropriate pain scale  - Administer analgesics based on type and severity of pain and evaluate response  - Implement non-pharmacological measures as appropriate and evaluate response  - Consider cultural and social influences on pain and pain management  - Manage/alleviate anxiety  - Utilize distraction and/or relaxation techniques  - Monitor for opioid side effects  - Notify MD/LIP if interventions unsuccessful or patient reports new pain  - Anticipate increased pain with activity and pre-medicate as appropriate  Outcome: Adequate for Discharge     Problem: RISK FOR INFECTION - ADULT  Goal: Absence of fever/infection during anticipated neutropenic period  Description: INTERVENTIONS  - Monitor WBC  - Administer growth factors as ordered  - Implement neutropenic guidelines  Outcome: Adequate for Discharge     Problem: SAFETY ADULT - FALL  Goal: Free from fall injury  Description: INTERVENTIONS:  - Assess pt frequently for physical needs  - Identify cognitive and physical deficits and behaviors that affect risk of falls.   - [place] fall precautions as indicated by assessment.  - Educate pt/family on patient safety including physical limitations  - Instruct pt to call for assistance with activity based on assessment  - Modify environment to reduce risk of injury  - Provide assistive devices as appropriate  - Consider OT/PT consult to assist with strengthening/mobility  - Encourage toileting schedule  Outcome: Adequate for Discharge     Problem: DISCHARGE PLANNING  Goal: Discharge to home or other facility with appropriate resources  Description: INTERVENTIONS:  - Identify barriers to discharge w/pt and caregiver  - Include patient/family/discharge partner in discharge planning  - Arrange for needed discharge resources and transportation as appropriate  - Identify discharge learning needs (meds, wound care, etc)  - Arrange for interpreters to assist at discharge as needed  - Consider post-discharge preferences of patient/family/discharge partner  - Complete POLST form as appropriate  - Assess patient's ability to be responsible for managing their own health  - Refer to Case Management Department for coordinating discharge planning if the patient needs post-hospital services based on physician/LIP order or complex needs related to functional status, cognitive ability or social support system  Outcome: Adequate for Discharge   Pt alert and oriented, worked with therapy and they clear her. More pain today and Nor co as needed for pain. Family at bed side and they are ready to go home with her daughter with home health discharge instruction will explain to her and family member. Pt had walker at home. Pain scripts send to her pharmacy.

## 2023-11-04 NOTE — CM/SW NOTE
FABIO followed up on DC planning. SW confirmed with pt's dtr that she will be discharging to the address in [address] to confirm they can see pt at this address    [facility] did confirm they can see pt and is aware pt will discharge today to that address    [facility] [phone]    PLAN: home with [facility] today     Tammy Pal [facility], MSW ext.  [phone]

## 2023-11-04 NOTE — PHYSICAL THERAPY NOTE
PHYSICAL THERAPY KNEE TREATMENT NOTE - INPATIENT     Room Number: 434/434-A             Presenting Problem: s/p left TKA 11/3/23       Problem List  Principal Problem:    Primary osteoarthritis of left knee  Active Problems:    Diabetes mellitus, type 2 (HCC)    Class 1 obesity due to excess calories with serious comorbidity and body mass index (BMI) of 34.0 to 34.9 in adult      PHYSICAL THERAPY ASSESSMENT     RN approved PT session and pt medicated prior PT session. Pt in supine, instructed on supine thera exs with ble's with emphasis on full ROM L knee. Pt with good activity connor and working towards set goals. Supine to sit with SBA and sitting connor on EOB. Sit to stand with SBA and pt needs assist to amb with RW to bathroom. Pt with SBA for sit to  bathroom, Pt amb to wc and taking to Rehab Room. Steps negotiation 4 steps with 2 HR and min A. Pt with good carryover. Pt was taking back to her room and amb on hallways 60 ft with RW SBA. Pt amb back to her room. Pt working on seated thera es with ble's to promote full ROM and muscle strength.  position pt in chair with all needs in reach. The patient's Approx Degree of Impairment: 28.97% has been calculated based on documentation in the [facility] '6 clicks' Inpatient Basic Mobility Short Form. Research supports that patients with this level of impairment may benefit from home with [facility] and intermittent Supervision. DISCHARGE RECOMMENDATIONS  PT Discharge Recommendations: Home with home health PT; Intermittent Supervision    PLAN  PT Treatment Plan: Bed mobility; Patient education;Gait training;Range of motion;Stair training;Transfer training;Balance training  Frequency (Obs): Daily      SUBJECTIVE  I like to walk    OBJECTIVE  Precautions:  needed (Irish)    WEIGHT BEARING STATUS           L Lower Extremity: Weight Bearing as Tolerated    PAIN ASSESSMENT   Ratin  Location: L knee  Management Techniques:  Activity promotion;Breathing techniques;Relaxation;Repositioning    BALANCE  Static Sitting: Good  Dynamic Sitting: Good  Static Standing: Fair +  Dynamic Standing: Fair    ACTIVITY TOLERANCE                         O2 WALK       AM-PAC '6-Clicks' INPATIENT SHORT FORM - BASIC MOBILITY  How much difficulty does the patient currently have. .. Patient Difficulty: Turning over in bed (including adjusting bedclothes, sheets and blankets)?: None   Patient Difficulty: Sitting down on and standing up from a chair with arms (e.g., wheelchair, bedside commode, etc.): None   Patient Difficulty: Moving from lying on back to sitting on the side of the bed?: None   How much help from another person does the patient currently need. .. Help from Another: Moving to and from a bed to a chair (including a wheelchair)?: A Little   Help from Another: Need to walk in hospital room?: A Little   Help from Another: Climbing 3-5 steps with a railing?: A Little     AM-PAC Score:  Raw Score: 21   Approx Degree of Impairment: 28.97%   Standardized Score (AM-PAC Scale): 50.25   CMS Modifier (G-Code):     FUNCTIONAL ABILITY STATUS  Functional Mobility/Gait Assessment  Gait Assistance:  (SBA)  Distance (ft): 40 ft and 60 ft  Assistive Device: Rolling walker  Pattern: L Decreased stance time  Stairs: Stairs  How Many Stairs: 4  Device: 2 Rails  Assist: Minimal assist  Pattern: Ascend and Descend  Ascend and Descend : Step to    Additional Information:     Exercises AM Session    Ankle Pumps 10 reps    Quad Sets 10 reps    Glut Sets 10 reps    Hip Abd/Add 10 reps    Heel slides 10 reps    Saq 0 reps    SLR 0 reps    Sitting Knee Flexion 10 reps    Standing heel/toe raises 0 reps    Standing knee flexion 0 reps    Extension stretch  1x        Knee ROM                 Patient End of Session: Up in chair;Needs met;Call light within reach;RN aware of session/findings; All patient questions and concerns addressed    CURRENT GOALS  Goals to be met by: 11/4/23  Patient Goal Patient's self-stated goal is: to go home with home PT   Goal #1 Patient is able to demonstrate supine - sit EOB @ level: modified independent     Goal #1   Current Status CGA/SBA   Goal #2 Patient is able to demonstrate transfers Sit to/from Stand at assistance level: supervision     Goal #2  Current Status Sit to stand with RW SBA   Goal #3 Patient is able to ambulate 150 feet with assistive device at assistance level: supervision   Goal #3   Current Status Pt amb 40 ft and 60 ft with RW SBA   Goal #4 Patient will negotiate 4 stairs/one curb w/ assistive device and supervision   Goal #4   Current Status 4 steps with 2 HR min A   Goal #5  AROM 0 degrees extension to 95 degrees flexion     Goal #5   Current Status In progress   Goal #6 Patient independently performs home exercise program for ROM/strengthening per the instructions provided in preparation for discharge.    Goal #6  Current Status In progress       Gait Trainin minutes  Therapeutic Activity: 15 minutes  Therapeutic EXs: 12 minutes

## 2023-11-05 NOTE — DISCHARGE SUMMARY
[facility]    Discharge Summary    Chi Mariee Patient Status:  Outpatient in a Bed    3/30/1965 MRN [id number]   Location [facility] 4W/SW/SE Attending No att. providers found   Hosp Day # 0 PCP Yomaira Haider MD     Date of Admission: 11/3/2023 Disposition: [facility]     Date of Discharge: 2023      Admitting Diagnosis: Primary osteoarthritis of left knee [M17.12]  Class 2 severe obesity due to excess calories with serious comorbidity and body mass index (BMI) of 35.0 to 35.9 in adult  [E66.01, Z68.35]  Primary osteoarthritis of left knee    Hospital Discharge Diagnoses:  OA    Hospital Discharge Diagnoses:  OA    Lace+ Score: 24  59-90 High Risk  29-58 Medium Risk  0-28   Low Risk. TCM Follow-Up Recommendation:  LACE < 29: Low Risk of readmission after discharge from the hospital; Still recommend for TCM follow-up. Lace+ Score: 24  59-90 High Risk  29-58 Medium Risk  0-28   Low Risk    Risk of readmission: Chi Mariee has Low Risk of readmission after discharge from the hospital.    Problem List: Patient Active Problem List:     Vulvodynia     Dyspareunia in female     Diabetes mellitus, type 2 ([facility] [address]     Primary osteoarthritis of left knee     Class 1 obesity due to excess calories with serious comorbidity and body mass index (BMI) of 34.0 to 34.9 in adult      Reason for Admission: oa     Physical Exam:   General appearance: alert, appears stated age and cooperative  Pulmonary:  clear to auscultation bilaterally  Cardiovascular: S1, S2 normal, no murmur, click, rub or gallop, regular rate and rhythm  Abdominal: soft, non-tender; bowel sounds normal; no masses,  no organomegaly  Extremities: extremities normal, atraumatic, no cyanosis or edema  Psychiatric: calm        History of Present Illness: Here for elective surgery     Hospital Course:       ASSESSMENT AND PLAN:    1.        Primary osteoarthritis of left knee, status post left total arthroplasty. Spinal block. Pain control. Neurovascular checks. Aspirin for DVT prophylaxis. Physical and occupational therapy. 2.       Diabetes mellitus type 2, non-insulin dependent. Continue home medications. Monitor Accu-Cheks. Sliding scale insulin. Consultations: Dr Lynn Rivera    Procedures: as above     Complications: none     Discharge Condition: Good    Discharge Medications:      Discharge Medications        START taking these medications        Instructions Prescription details   acetaminophen 325 MG Tabs  Commonly known as: Tylenol      Take 2 tablets (650 mg total) by mouth every 8 (eight) hours as needed for Pain. No more than 4000 mg acetaminophen/Tylenol per day from all sources. Recall that each Norco has 325 mg of Tylenol in it. Quantity: 60 tablet  Refills: 0     calcium carbonate-vitamin D 250-3. 125 MG-MCG Tabs  Commonly known as: Oyster Shell-D      Take 1 tablet by mouth 2 (two) times daily with meals. Quantity: 60 tablet  Refills: 0     docusate sodium 100 MG Caps  Commonly known as: COLACE      Take 100 mg by mouth 2 (two) times daily. Do not crush. Stop if loose stool. Quantity: 20 capsule  Refills: 0     HYDROcodone-acetaminophen 7.5-325 MG Tabs  Commonly known as: Norco      Take 1 tablet by mouth every 6 (six) hours as needed for Pain. Maximum dose of acetaminophen is 4000 mg from all sources in 24 hours. Quantity: 25 tablet  Refills: 0     multivitamin Chew      Chew 1 tablet by mouth daily. Quantity: 60 tablet  Refills: 0     naproxen 250 MG Tabs  Commonly known as: Naprosyn      Take 1 tablet (250 mg total) by mouth 2 (two) times daily with meals. Take with food. Stop if stomach upset.    Quantity: 30 tablet  Refills: 0            CHANGE how you take these medications        Instructions Prescription details   aspirin 325 MG Tbec  What changed:   medication strength  how much to take  when to take this  additional instructions      Take 1 tablet (325 mg total) by mouth in the morning and 1 tablet (325 mg total) before bedtime. Do not crush. After 30 days, resume once daily aspirin 81 mg as you were doing before surgery. .   Quantity: 60 tablet  Refills: 0     Jardiance 25 MG Tabs  Generic drug: Empagliflozin  What changed: additional instructions      Take 25 mg by mouth every morning before breakfast.   Quantity: 90 tablet  Refills: 0     Trulicity 3 AQ/7.2JC Sopn  Generic drug: Dulaglutide  What changed: additional instructions      Inject 3 mg into the skin once a week. Quantity: 2 mL  Refills: 2            CONTINUE taking these medications        Instructions Prescription details   ergocalciferol 1.25 MG (02877 UT) Caps  Commonly known as: Vitamin D2      TAKE 1 CAPSULE BY MOUTH 1 TIME A WEEK   Quantity: 8 capsule  Refills: 0     gabapentin 300 MG Caps  Commonly known as: Neurontin      Take 1 capsule (300 mg total) by mouth nightly. Quantity: 30 capsule  Refills: 3     glipiZIDE 5 MG Tabs  Commonly known as: Glucotrol      Take 0.5 tablets (2.5 mg total) by mouth daily with breakfast AND 1 tablet (5 mg total) daily with dinner. Stop taking on: November 8, 2023  Quantity: 135 tablet  Refills: 0     metFORMIN HCl 1000 MG Tabs  Commonly known as: GLUCOPHAGE      Take 1 tablet (1,000 mg total) by mouth 2 (two) times daily. Refills: 0     OneTouch Ultra Strp  Generic drug: Glucose Blood      TEST TWICE DAILY   Quantity: 200 strip  Refills: 1               Where to Get Your Medications        These medications were sent to [facility], [address], [phone], [phone]  [place] [address]      Phone: [phone]   acetaminophen 325 MG Tabs  aspirin 325 MG Tbec  calcium carbonate-vitamin D 250-3. 125 MG-MCG Tabs  docusate sodium 100 MG Caps  HYDROcodone-acetaminophen 7.5-325 MG Tabs  multivitamin Chew  naproxen 250 MG Tabs         Follow up Visits:  Follow-up with ortho  in 1 week    Follow up Labs: none     Other Discharge Instructions: none    Baldomero Oakley DO  11/5/2023  7:55 AM    > 35 min

## 2023-11-10 ENCOUNTER — TELEPHONE (OUTPATIENT)
Dept: ENDOCRINOLOGY CLINIC | Facility: CLINIC | Age: 58
End: 2023-11-10

## 2023-11-10 ENCOUNTER — OFFICE VISIT (OUTPATIENT)
Dept: ENDOCRINOLOGY CLINIC | Facility: CLINIC | Age: 58
End: 2023-11-10
Payer: MEDICAID

## 2023-11-10 VITALS
SYSTOLIC BLOOD PRESSURE: 136 MMHG | DIASTOLIC BLOOD PRESSURE: 83 MMHG | BODY MASS INDEX: 36 KG/M2 | HEART RATE: 81 BPM | WEIGHT: 173 LBS

## 2023-11-10 DIAGNOSIS — E11.9 TYPE 2 DIABETES MELLITUS WITHOUT COMPLICATION, WITHOUT LONG-TERM CURRENT USE OF INSULIN (HCC): Primary | ICD-10-CM

## 2023-11-10 DIAGNOSIS — E78.5 HYPERLIPIDEMIA, UNSPECIFIED HYPERLIPIDEMIA TYPE: ICD-10-CM

## 2023-11-10 LAB
CARTRIDGE LOT#: ABNORMAL NUMERIC
GLUCOSE BLOOD: 110
HEMOGLOBIN A1C: 6.6 % (ref 4.3–5.6)
TEST STRIP LOT #: NORMAL NUMERIC

## 2023-11-10 PROCEDURE — 82947 ASSAY GLUCOSE BLOOD QUANT: CPT | Performed by: NURSE PRACTITIONER

## 2023-11-10 PROCEDURE — 99214 OFFICE O/P EST MOD 30 MIN: CPT | Performed by: NURSE PRACTITIONER

## 2023-11-10 PROCEDURE — 3079F DIAST BP 80-89 MM HG: CPT | Performed by: NURSE PRACTITIONER

## 2023-11-10 PROCEDURE — 3075F SYST BP GE 130 - 139MM HG: CPT | Performed by: NURSE PRACTITIONER

## 2023-11-10 PROCEDURE — 83036 HEMOGLOBIN GLYCOSYLATED A1C: CPT | Performed by: NURSE PRACTITIONER

## 2023-11-10 PROCEDURE — 3044F HG A1C LEVEL LT 7.0%: CPT | Performed by: NURSE PRACTITIONER

## 2023-11-10 NOTE — PATIENT INSTRUCTIONS
A1C: 6.6% today  --> stable from 6.5% on 8/10/2023  Blood glucose: 110 in clinic today    Medications:   - continue with Metformin 1,000mg twice daily   - stop Glipizide   - continue Jardiance 25mg once daily in the morning  - continue with Trulicity 3mg once weekly     - continue to follow a low carb diet   - repeat lipid panel in 2 months     Weight:  Wt Readings from Last 6 Encounters:   11/10/23 173 lb (78.5 kg)   11/03/23 166 lb (75.3 kg)   09/08/23 177 lb (80.3 kg)   09/06/23 181 lb (82.1 kg)   08/21/23 181 lb (82.1 kg)   08/15/23 181 lb (82.1 kg)     A1C goal:  <7.0%    Blood sugar testing:  Test your blood sugar 1 time daily   Recommended times to test: Before breakfast (fasting) or 2hrs after meals     Blood sugar targets:  Before breakfast:   (preferably < 110)  Before meals OR 2 hours after meals: <180 (preferably <150)     Call for persistent blood sugars < 75 or > 200

## 2023-11-11 NOTE — TELEPHONE ENCOUNTER
Endo staff: please obtain patient's last office visit note with optho -- she has been seen by Dr. Ocampo - with [facility]     Tel: [phone]       Thank you!

## 2023-11-13 ENCOUNTER — TELEPHONE (OUTPATIENT)
Dept: ORTHOPEDICS CLINIC | Facility: CLINIC | Age: 58
End: 2023-11-13

## 2023-11-13 DIAGNOSIS — M17.12 PRIMARY OSTEOARTHRITIS OF LEFT KNEE: Primary | ICD-10-CM

## 2023-11-13 DIAGNOSIS — Z96.652 S/P TKR (TOTAL KNEE REPLACEMENT) USING CEMENT, LEFT: ICD-10-CM

## 2023-11-13 RX ORDER — HYDROCODONE BITARTRATE AND ACETAMINOPHEN 7.5; 325 MG/1; MG/1
1 TABLET ORAL EVERY 6 HOURS PRN
Qty: 25 TABLET | Refills: 0 | Status: SHIPPED | OUTPATIENT
Start: 2023-11-13

## 2023-11-13 NOTE — TELEPHONE ENCOUNTER
Called eye clinic with phone number provided below, spoke to associate and she stated she will fax over patients recent eye exam to our office. Awaiting eye exam office chart notes.

## 2023-11-13 NOTE — TELEPHONE ENCOUNTER
Refill request for McKenzie 7.5/325 from [facility].    S/p left TKA on 11/3/23  Last rx given on 11/3/23 #25 no refill  Pt PO appt on 11/20/23  Please advise on refill request for norco

## 2023-11-15 NOTE — TELEPHONE ENCOUNTER
Received a fax of patients most recent eye exam dated on 10/23/23 at [facility] Eye exam was documented in patient's 'Diabetes Flowsheet' and placed in providers folder for review.

## 2023-11-20 ENCOUNTER — OFFICE VISIT (OUTPATIENT)
Dept: ORTHOPEDICS CLINIC | Facility: CLINIC | Age: 58
End: 2023-11-20

## 2023-11-20 ENCOUNTER — HOSPITAL ENCOUNTER (OUTPATIENT)
Dept: GENERAL RADIOLOGY | Facility: HOSPITAL | Age: 58
Discharge: HOME | End: 2023-11-20
Attending: ORTHOPAEDIC SURGERY
Payer: MEDICAID

## 2023-11-20 DIAGNOSIS — Z47.89 ORTHOPEDIC AFTERCARE: ICD-10-CM

## 2023-11-20 DIAGNOSIS — Z96.652 S/P TKR (TOTAL KNEE REPLACEMENT) USING CEMENT, LEFT: ICD-10-CM

## 2023-11-20 DIAGNOSIS — M17.12 PRIMARY OSTEOARTHRITIS OF LEFT KNEE: Primary | ICD-10-CM

## 2023-11-20 DIAGNOSIS — E66.01 CLASS 2 SEVERE OBESITY DUE TO EXCESS CALORIES WITH SERIOUS COMORBIDITY AND BODY MASS INDEX (BMI) OF 35.0 TO 35.9 IN ADULT: ICD-10-CM

## 2023-11-20 PROCEDURE — 73562 X-RAY EXAM OF KNEE 3: CPT | Performed by: ORTHOPAEDIC SURGERY

## 2023-11-20 PROCEDURE — 99024 POSTOP FOLLOW-UP VISIT: CPT | Performed by: ORTHOPAEDIC SURGERY

## 2023-11-20 NOTE — PROGRESS NOTES
Per verbal order from Dr. Luís Appiah remove patients staples were removed and incision looks well approximated and no redness or discharge noted. Pt tolerated the procedure.  Marlys Slater MA

## 2023-11-20 NOTE — PROGRESS NOTES
NURSING INTAKE COMMENTS:   Chief Complaint   Patient presents with    Post-Op     1 st POV Left knee TKA 11/03/2023. Pain 7/10, left lateral aspect numbness. HPI: This 62year old female presents today under 2.5 weeks after left knee replacement. She is doing pretty well. She is walking with a walker. She is taking calcium and vitamin as well as aspirin anticoagulation. She is out of the anti-inflammatory but with her diabetes I do not want to refill it since she is also taking aspirin. I told her it was helpful for the first 2 weeks. Home therapy is ending and I gave her a prescription for outpatient therapy. I taught the family how to do home exercise. Past Medical History:   Diagnosis Date    Diabetes ([address]     Diabetes mellitus ([address]     Esophageal reflux     Migraines     Osteoarthritis     RIGHT ARM PAIN, left knee     Past Surgical History:   Procedure Laterality Date    CHOLECYSTECTOMY      CYSTOSCOPY,INSERT URETERAL STENT      bladder sling 7 years ago    HYSTERECTOMY      TUBAL LIGATION       Current Outpatient Medications   Medication Sig Dispense Refill    HYDROcodone-acetaminophen 7.5-325 MG Oral Tab Take 1 tablet by mouth every 6 (six) hours as needed for Pain. Maximum dose of acetaminophen is 4000 mg from all sources in 24 hours. 25 tablet 0    aspirin 325 MG Oral Tab EC Take 1 tablet (325 mg total) by mouth in the morning and 1 tablet (325 mg total) before bedtime. Do not crush. After 30 days, resume once daily aspirin 81 mg as you were doing before surgery. . 60 tablet 0    acetaminophen 325 MG Oral Tab Take 2 tablets (650 mg total) by mouth every 8 (eight) hours as needed for Pain. No more than 4000 mg acetaminophen/Tylenol per day from all sources. Recall that each Norco has 325 mg of Tylenol in it. 60 tablet 0    calcium carbonate-vitamin D 250-3. 125 MG-MCG Oral Tab Take 1 tablet by mouth 2 (two) times daily with meals.  60 tablet 0    docusate sodium 100 MG Oral Cap Take 100 mg by mouth 2 (two) times daily. Do not crush. Stop if loose stool. 20 capsule 0    multivitamin Oral Chew Tab Chew 1 tablet by mouth daily. 60 tablet 0    Dulaglutide (TRULICITY) 3 FG/6.5FE Subcutaneous Solution Pen-injector Inject 3 mg into the skin once a week. 2 mL 2    gabapentin 300 MG Oral Cap Take 1 capsule (300 mg total) by mouth nightly. 30 capsule 3    ONETOUCH ULTRA In Vitro Strip TEST TWICE DAILY 200 strip 1    ERGOCALCIFEROL 1.25 MG (00256 UT) Oral Cap TAKE 1 CAPSULE BY MOUTH 1 TIME A WEEK 8 capsule 0    metFORMIN HCl 1000 MG Oral Tab Take 1 tablet (1,000 mg total) by mouth 2 (two) times daily. Empagliflozin (JARDIANCE) 25 MG Oral Tab Take 25 mg by mouth every morning before breakfast. 90 tablet 0     No Known Allergies  History reviewed. No pertinent family history. No family Hx of DVT/PE    Social History     Occupational History    Not on file   Tobacco Use    Smoking status: Never    Smokeless tobacco: Never   Vaping Use    Vaping Use: Never used   Substance and Sexual Activity    Alcohol use: Not Currently    Drug use: Never    Sexual activity: Yes        Review of Systems:  GENERAL: feels generally well, no significant weight loss or weight gain  SKIN: no ulcerated or worrisome skin lesions  EYES:denies blurred vision or double vision  HEENT: denies new nasal congestion, sinus pain or ST  LUNGS: denies shortness of breath  CARDIOVASCULAR: denies chest pain  GI: no hematemesis, no worsening heartburn, no diarrhea  : no dysuria, no blood in urine, no difficulty urinating, no incontinence  MUSCULOSKELETAL: no other musculoskeletal complaints other than in HPI  NEURO: no numbness or tingling, no weakness or balance disorder  PSYCHE: no depression or anxiety  HEMATOLOGIC: no hx of blood dyscrasia, no Hx DVT/PE  ENDOCRINE: no thyroid or diabetes issues  ALL/ASTHMA: no new hx of severe allergy or asthma    Physical Examination:    There were no vitals taken for this visit.   Constitutional: appears well hydrated, alert and responsive, no acute distress noted  Extremities: Incisions healed well. Staples removed and new Mepilex placed. Left knee with mild swelling but no large effusion and no pitting edema. Left leg with no swelling or edema at all. Calves soft and nontender. Musculoskeletal: Active motion 0 to 95 degrees. Passively I was able to flex her to 105 degrees. No instability or patellar tracking issues. Neurological: Normal motor and sensory left lower extremity. Imaging: X-rays show the left knee replacement well fixed and in proper alignment. XR KNEE (1 OR 2 VIEWS), LEFT (CPT=73560)    Result Date: 11/3/2023  PROCEDURE: XR KNEE (1 OR 2 VIEWS), LEFT (CPT=73560)  COMPARISON: [facility], MRI KNEE PUNEET, LEFT (YCI=15318), 8/30/2023, 10:32 AM.  [facility] 2nd Floor, XR KNEE (3 VIEWS) AP LAT OBL BILAT EM (CPT=73562-50), 5/15/2023, 9:17 AM.  INDICATIONS: Left knee joint osteoarthritis, status post left total knee arthroplasty. TECHNIQUE: 2 views were obtained. FINDINGS:  BONES: Postoperative changes are noted from a recent cemented left total knee arthroplasty and patellar resurfacing. The hardware is intact and in customary positioning. No periprosthetic fracture or dislocation. SOFT TISSUES: Subcutaneous emphysema and soft tissue swelling are seen about the knee. Vertically oriented skin staples and an associated bandage are anterior to the knee. EFFUSION: There is air and fluid in the left knee joint. OTHER: Negative. CONCLUSION:  Status post left total knee arthroplasty.     Dictated by (CST): Rodrigue Coburn MD on 11/03/2023 at 2:32 PM     Finalized by (CST): Rodrigue Coburn MD on 11/03/2023 at 2:33 PM             Lab Results   Component Value Date    WBC 10.3 06/23/2023    HGB 11.9 (L) 11/04/2023    .0 06/23/2023      Lab Results   Component Value Date     (H) 11/04/2023    BUN <5 (L) 11/04/2023    CREATSERUM 0.51 (L) 11/04/2023        Assessment and Plan:  Diagnoses and all orders for this visit:    Primary osteoarthritis of left knee  -     PHYSICAL THERAPY - INTERNAL    Orthopedic aftercare  -     XR KNEE (3 VIEWS), LEFT (CPT=73562); Future  -     PHYSICAL THERAPY - INTERNAL    S/P TKR (total knee replacement) using cement, left  -     PHYSICAL THERAPY - INTERNAL    Class 2 severe obesity due to excess calories with serious comorbidity and body mass index (BMI) of 35.0 to 35.9 in adult         Assessment: 2.5 weeks after left knee replacement doing well. Plan: This regard to the obesity, we will work on calorie counting and portion control once she has healed a little more. I explained this to the patient and daughter. The daughter translated. She did not need a refill of the narcotic. I told her I did not want to refill the NSAID due to her diabetes. She will continue aspirin anticoagulation however 1.5 more weeks. I taught the patient and the daughter how to do the home exercise program and how the family can help her bend and straighten. She will start outpatient therapy as well. I explained the importance of the home exercise program several hours a day. I will see her in 4 weeks for x-rays of her left knee. Follow Up: No follow-ups on file.     Joann Tejeda MD

## 2023-11-24 ENCOUNTER — OFFICE VISIT (OUTPATIENT)
Dept: PHYSICAL THERAPY | Facility: HOSPITAL | Age: 58
End: 2023-11-24
Attending: ORTHOPAEDIC SURGERY
Payer: MEDICAID

## 2023-11-24 DIAGNOSIS — M17.12 PRIMARY OSTEOARTHRITIS OF LEFT KNEE: ICD-10-CM

## 2023-11-24 DIAGNOSIS — Z96.652 S/P TKR (TOTAL KNEE REPLACEMENT) USING CEMENT, LEFT: ICD-10-CM

## 2023-11-24 DIAGNOSIS — Z47.89 ORTHOPEDIC AFTERCARE: Primary | ICD-10-CM

## 2023-11-24 PROCEDURE — 97162 PT EVAL MOD COMPLEX 30 MIN: CPT

## 2023-11-24 PROCEDURE — 97110 THERAPEUTIC EXERCISES: CPT

## 2023-11-28 ENCOUNTER — OFFICE VISIT (OUTPATIENT)
Dept: PHYSICAL THERAPY | Facility: HOSPITAL | Age: 58
End: 2023-11-28
Attending: ORTHOPAEDIC SURGERY
Payer: MEDICAID

## 2023-11-28 PROCEDURE — 97112 NEUROMUSCULAR REEDUCATION: CPT

## 2023-11-28 PROCEDURE — 97140 MANUAL THERAPY 1/> REGIONS: CPT

## 2023-11-28 PROCEDURE — 97110 THERAPEUTIC EXERCISES: CPT

## 2023-11-28 NOTE — PROGRESS NOTES
Diagnosis:   Orthopedic aftercare (Z47.89)  Primary osteoarthritis of left knee (M17.12)  S/P TKR (total knee replacement) using cement, left (X11.657)      Referring Provider: Jorge Hood  Date of Evaluation:    11/24/2023    Precautions:  Diabetes Next MD visit: Need to schedule    Date of Surgery: 11/3/2023           Insurance Primary/Secondary: BL[employer]OSS MEDICAID / N/A     # Auth Visits: 15            Subjective: Pt said the pain is still there, took pain med before. Pain: 7/10 (5-6/10 end of session)       Objective: gross ROM- progressing well, warmth along calf and knee (also on R leg), increased swelling below malleoli, no pitting edema in leg, 1-2+ pitting edema along L knee. Pt stated deep pressure along left calf felt good (like a massage)    Flexion improves symptoms in low back     Strength/MMT: (* denotes performed with pain): TBD    Balance: SLS: R 27 sec, L 3 sec    Functional Mobility:  5x sit<>stand: 19 sec (increased WB on right leg)  TUG (AD, time): 18 sec  SPC    Initial eval:  Observation/Skin: erythema doris incision, several scabs along incision. Increased swelling around the knee. Palpation: warmth, 1-2+ pitting edema, tenderness along incision, and to nearby muscles  Edema: at knee jt line R: 49.5 cm, L 41.5 cm  Sensation: grossly intact, diminished doris-inscision, pt notes tingling in her toes (likely related to diabetic neuropathy)    AROM: (* denotes performed with pain)   Knee    Flexion: R 121; L 105   Extension: R -2; L -4     PROM: (* denotes performed with pain)   Knee    Flexion: R NT; L 108   Extension: R NT; L -2     Patellar Mobility/Accessory motion: sup/inf mobility limited L    Gait: pt ambulates on level ground with SPC, increased WB on R, increased flexion throughout. Assessment: Pt reports high levels of pain throughout the left leg, though her ROM is progressing very well.  Was able to tolerate gentle exercises for knee ROM- though she notes symptoms similar for lumbar radiculopathy. Will need to differentiate symptoms from back vs knee. Also- pt noted increased swelling in her left ankle (1+ pitting edema posterior to malleoli), no pitting edema in her lower leg, 1-2+ pitting edema along the left knee. Warmth was noted in her lower leg and knee bilaterally, no erythema or tenderness. Discussed risks of DVT after surgery and signs to look for. PT does not believe pt has a blood clot, though advised pt and her granddaughter to call her surgeon and ask if her symptoms warrant a doppler US to rule out DVT. Will follow up next session. Goals:   Goals: (To be met in 18 visits)   Pt will improve knee extension ROM to 0 deg to allow proper heel strike during gait and terminal knee extension in stance  Pt will improve knee AROM flexion to >120 degrees to improve ability to perform stair negotiation  Pt will improve quad strength to 5/5 to ascend 1 flight of stairs reciprocally without UE assist  Pt will increase hip and knee strength to grossly 4+/5 to be able to get up and down from the floor safely  Pt will demonstrate increased hip ER/ABD strength to 4+/5 to perform stepping and squatting activities without excessive femoral IR/ADD  Pt will improve SLS to >10s to improve safety and independence with gait on uneven surfaces such as grass  Pt will be independent and compliant with comprehensive HEP to maintain progress achieved in PT    Plan: Patient will be seen for 2 x/week or a total of 18 visits over a 90 day period. Treatment will include: Gait training, Manual Therapy, Neuromuscular Re-education, Therapeutic Activities, Therapeutic Exercise, and Home Exercise Program instruction  Date: 11/28/2023  TX#: 2/15-18 Date:                 TX#: 3/ Date:                 TX#: 4/ Date:                 TX#: 5/ Date:    Tx#: 6/   TE 20'  Assessment: 5xSTS, TUG, SLS  Sit to stands x10  Seated forward flexion x20  DKTC SB x3'  SAQ x3' (reminders to breath and slow down)  Attempt LAQ: too difficult   Seated heel and toe raises x20  Pt edu: risks and signs and symptoms of DVT, lumbar spine radiculopathy symptoms vs knee symptoms       MT 15'  STM calf, HS, popliteal region  Hip flexor stretch off table with STM along quad  G2-3 patellar mobs  Passive stretching        NR 15'  Gait training: emphasis on increased knee flexion, and heel to toe contact  Adjust cane height              HEP:   - Supine Quad Set  - 2 x daily - 7 x weekly - 2 sets - 10 reps - 3 second hold  - Seated Hamstring Stretch  - 3 x daily - 7 x weekly - 3 sets - 10-15 seconds hold  - Seated Heel Slide  - 2 x daily - 7 x weekly - 2 sets - 10 reps  - Sit to Stand  - 3 x daily - 7 x weekly - 10 reps  - Supine Diaphragmatic Breathing  - 1 x daily - 7 x weekly  - Supine Lower Trunk Rotation  - 2 x daily - 7 x weekly - 20 reps  - Supine Heel Slide with Strap  - 1-2 x daily - 7 x weekly - 2 sets - 10 reps  - Supine Single Leg Ankle Pumps  - 2-3 x daily - 7 x weekly - 30 reps  - Supine Gluteal Sets  - 2-3 x daily - 7 x weekly - 3 sets - 10 reps  - Standing Weight Shift Side to Side  - 2 x daily - 7 x weekly - 2 sets - 10 reps  - Standing Weight Shifting Forward and Backward  - 2 x daily - 7 x weekly - 2 sets - 10 reps  - Seated Lumbar Flexion Stretch  - 3-4 x daily - 7 x weekly - 10 reps    Charges: 1 TE, 1 MT, 1 NR       Total Timed Treatment: 50 min  Total Treatment Time: 50 min

## 2023-11-29 NOTE — PROGRESS NOTES
Diagnosis:   Orthopedic aftercare (Z47.89)  Primary osteoarthritis of left knee (M17.12)  S/P TKR (total knee replacement) using cement, left (O37.077)      Referring Provider: Zay Gutierrez  Date of Evaluation:    11/24/2023    Precautions:  Diabetes Next MD visit: Need to schedule    Date of Surgery: 11/3/2023           Insurance Primary/Secondary: BL[employer]OSS MEDICAID / N/A     # Auth Visits: 15            Subjective: Pt says she feels much better. Pain: 7/10 L knee      Objective: 125 knee flex; -2 knee ext     Strength/MMT: (* denotes performed with pain): TBD    Balance: SLS: R 27 sec, L 3 sec    Functional Mobility:  5x sit<>stand: 19 sec (increased WB on right leg)  TUG (AD, time): 18 sec  SPC    Initial eval:  Observation/Skin: erythema doris incision, several scabs along incision. Increased swelling around the knee. Palpation: warmth, 1-2+ pitting edema, tenderness along incision, and to nearby muscles  Edema: at knee jt line R: 49.5 cm, L 41.5 cm  Sensation: grossly intact, diminished doris-inscision, pt notes tingling in her toes (likely related to diabetic neuropathy)    AROM: (* denotes performed with pain)   Knee    Flexion: R 121; L 105   Extension: R -2; L -4     PROM: (* denotes performed with pain)   Knee    Flexion: R NT; L 108   Extension: R NT; L -2     Patellar Mobility/Accessory motion: sup/inf mobility limited L    Gait: pt ambulates on level ground with SPC, increased WB on R, increased flexion throughout. Assessment: Pt continues to report higher levels of pain in the left leg and knee. Provided pt with tubi- to wear during the day and evening per md orders, which pt tolerated better than YURIY hose. Pt was using compression socks, though they were so tight they cut the back of her knee. Reported pain with SLR in her groin, and the back of the knee due to the pressure pushing her knee straight into extension. Otherwise tolerated session well.         Goals:   Goals: (To be met in 18 visits)   Pt will improve knee extension ROM to 0 deg to allow proper heel strike during gait and terminal knee extension in stance  Pt will improve knee AROM flexion to >120 degrees to improve ability to perform stair negotiation  Pt will improve quad strength to 5/5 to ascend 1 flight of stairs reciprocally without UE assist  Pt will increase hip and knee strength to grossly 4+/5 to be able to get up and down from the floor safely  Pt will demonstrate increased hip ER/ABD strength to 4+/5 to perform stepping and squatting activities without excessive femoral IR/ADD  Pt will improve SLS to >10s to improve safety and independence with gait on uneven surfaces such as grass  Pt will be independent and compliant with comprehensive HEP to maintain progress achieved in PT    Plan: Patient will be seen for 2 x/week or a total of 18 visits over a 90 day period. Treatment will include: Gait training, Manual Therapy, Neuromuscular Re-education, Therapeutic Activities, Therapeutic Exercise, and Home Exercise Program instruction  Date: 11/28/2023  TX#: 2/15-18 Date: 11/30/2023            TX#: 3/15-18 Date:                 TX#: 4/ Date:                 TX#: 5/ Date:    Tx#: 6/   TE 20'  Assessment: 5xSTS, TUG, SLS  Sit to stands x10  Seated forward flexion x20  DKTC SB x3'  SAQ x3' (reminders to breath and slow down)  Attempt LAQ: too difficult   Seated heel and toe raises x20  Pt edu: risks and signs and symptoms of DVT, lumbar spine radiculopathy symptoms vs knee symptoms TE 23'  Nustep lvl 3 x5' for ROM and low back relief  ROM assessment  DKTC SB x3'  SAQ x2' (reminders to breath and slow down)  SLR x10 L   Sit to stands x15  Pt edu: use of tubi- during day and evening       MT 15'  STM calf, HS, popliteal region  Hip flexor stretch off table with STM along quad  G2-3 patellar mobs  Passive stretching  MT 20'  STM calf, HS, popliteal region  Hip flexor stretch off table with STM along quad  GII-III FT AP/PA glides G2-3 patellar mobs  Passive stretching       NR 15'  Gait training: emphasis on increased knee flexion, and heel to toe contact  Adjust cane height              HEP:   - Supine Quad Set  - 2 x daily - 7 x weekly - 2 sets - 10 reps - 3 second hold  - Seated Hamstring Stretch  - 3 x daily - 7 x weekly - 3 sets - 10-15 seconds hold  - Seated Heel Slide  - 2 x daily - 7 x weekly - 2 sets - 10 reps  - Sit to Stand  - 3 x daily - 7 x weekly - 10 reps  - Supine Diaphragmatic Breathing  - 1 x daily - 7 x weekly  - Supine Lower Trunk Rotation  - 2 x daily - 7 x weekly - 20 reps  - Supine Heel Slide with Strap  - 1-2 x daily - 7 x weekly - 2 sets - 10 reps  - Supine Single Leg Ankle Pumps  - 2-3 x daily - 7 x weekly - 30 reps  - Supine Gluteal Sets  - 2-3 x daily - 7 x weekly - 3 sets - 10 reps  - Standing Weight Shift Side to Side  - 2 x daily - 7 x weekly - 2 sets - 10 reps  - Standing Weight Shifting Forward and Backward  - 2 x daily - 7 x weekly - 2 sets - 10 reps  - Seated Lumbar Flexion Stretch  - 3-4 x daily - 7 x weekly - 10 reps    Charges: 2 TE, 1 MT       Total Timed Treatment: 43 min  Total Treatment Time: 43 min

## 2023-11-30 ENCOUNTER — OFFICE VISIT (OUTPATIENT)
Dept: PHYSICAL THERAPY | Facility: HOSPITAL | Age: 58
End: 2023-11-30
Attending: ORTHOPAEDIC SURGERY
Payer: MEDICAID

## 2023-11-30 DIAGNOSIS — M17.12 PRIMARY OSTEOARTHRITIS OF LEFT KNEE: ICD-10-CM

## 2023-11-30 DIAGNOSIS — Z96.652 S/P TKR (TOTAL KNEE REPLACEMENT) USING CEMENT, LEFT: ICD-10-CM

## 2023-11-30 PROCEDURE — 97140 MANUAL THERAPY 1/> REGIONS: CPT

## 2023-11-30 PROCEDURE — 97110 THERAPEUTIC EXERCISES: CPT

## 2023-12-01 RX ORDER — HYDROCODONE BITARTRATE AND ACETAMINOPHEN 7.5; 325 MG/1; MG/1
1 TABLET ORAL EVERY 6 HOURS PRN
Qty: 25 TABLET | Refills: 0 | Status: SHIPPED | OUTPATIENT
Start: 2023-12-01

## 2023-12-05 ENCOUNTER — OFFICE VISIT (OUTPATIENT)
Dept: PHYSICAL THERAPY | Facility: HOSPITAL | Age: 58
End: 2023-12-05
Attending: ORTHOPAEDIC SURGERY
Payer: MEDICAID

## 2023-12-05 PROCEDURE — 97140 MANUAL THERAPY 1/> REGIONS: CPT

## 2023-12-05 PROCEDURE — 97110 THERAPEUTIC EXERCISES: CPT

## 2023-12-06 NOTE — PROGRESS NOTES
Diagnosis:   Orthopedic aftercare (Z47.89)  Primary osteoarthritis of left knee (M17.12)  S/P TKR (total knee replacement) using cement, left (E28.451)      Referring Provider: Miriam Ferrer  Date of Evaluation:    11/24/2023    Precautions:  Diabetes Next MD visit: Need to schedule    Date of Surgery: 11/3/2023           Insurance Primary/Secondary: BL[employer]OSS MEDICAID / N/A     # Auth Visits: 15            Subjective: Took a pain pill before therapy today so is feeling okay; has some difficulty with stairs at home. Likes the tubi-, is taking off before going to bed. Pain: \"just a little\" 6/10 L knee       Objective: 125 knee flex; -2 knee ext     Strength/MMT: (* denotes performed with pain): TBD    Balance: SLS: R 27 sec, L 3 sec    Functional Mobility:  5x sit<>stand: 19 sec (increased WB on right leg)  TUG (AD, time): 18 sec  SPC    Initial eval:  Observation/Skin: erythema doris incision, several scabs along incision. Increased swelling around the knee. Palpation: warmth, 1-2+ pitting edema, tenderness along incision, and to nearby muscles  Edema: at knee jt line R: 49.5 cm, L 41.5 cm  Sensation: grossly intact, diminished doris-inscision, pt notes tingling in her toes (likely related to diabetic neuropathy)    AROM: (* denotes performed with pain)   Knee    Flexion: R 121; L 105   Extension: R -2; L -4     PROM: (* denotes performed with pain)   Knee    Flexion: R NT; L 108   Extension: R NT; L -2     Patellar Mobility/Accessory motion: sup/inf mobility limited L    Gait: pt ambulates on level ground with SPC, increased WB on R, increased flexion throughout. Assessment: ROM progressing, pt limited by pain but adequate rest breaks given as needed. Introducing step up/down with LLE for strength training, pt relies heavily on UE when fatigued but demonstrates good concentric force generation with first set of repetition.        Goals:   Goals: (To be met in 18 visits)   Pt will improve knee extension ROM to 0 deg to allow proper heel strike during gait and terminal knee extension in stance  Pt will improve knee AROM flexion to >120 degrees to improve ability to perform stair negotiation  Pt will improve quad strength to 5/5 to ascend 1 flight of stairs reciprocally without UE assist  Pt will increase hip and knee strength to grossly 4+/5 to be able to get up and down from the floor safely  Pt will demonstrate increased hip ER/ABD strength to 4+/5 to perform stepping and squatting activities without excessive femoral IR/ADD  Pt will improve SLS to >10s to improve safety and independence with gait on uneven surfaces such as grass  Pt will be independent and compliant with comprehensive HEP to maintain progress achieved in PT    Plan: Patient will be seen for 2 x/week or a total of 18 visits over a 90 day period. Treatment will include: Gait training, Manual Therapy, Neuromuscular Re-education, Therapeutic Activities, Therapeutic Exercise, and Home Exercise Program instruction  Date: 11/28/2023  TX#: 2/15-18 Date: 11/30/2023            TX#: 3/15-18 Date:    12/5/2023              TX#: 4/15-18 Date:                 TX#: 5/ Date:    Tx#: 6/   TE 20'  Assessment: 5xSTS, TUG, SLS  Sit to stands x10  Seated forward flexion x20  DKTC SB x3'  SAQ x3' (reminders to breath and slow down)  Attempt LAQ: too difficult   Seated heel and toe raises x20  Pt edu: risks and signs and symptoms of DVT, lumbar spine radiculopathy symptoms vs knee symptoms TE 23'  Nustep lvl 3 x5' for ROM and low back relief  ROM assessment  DKTC SB x3'  SAQ x2' (reminders to breath and slow down)  SLR x10 L   Sit to stands x15  Pt edu: use of tubi- during day and evening  TE: 30'  Nustep lvl 3 x5' for ROM and low back relief  DKTC SB x3'  Long Sit calf stretch 3'  SLR x10 L   Sit to stands (RLE forward) x15  4in step up/down LLE x4 lap   6in step up, 1UE x12   Double Leg Press 30# (light weight d/t fatigue) x25          MT 15'  STM calf, HS, popliteal region  Hip flexor stretch off table with STM along quad  G2-3 patellar mobs  Passive stretching  MT 20'  STM calf, HS, popliteal region  Hip flexor stretch off table with STM along quad  GII-III FT AP/PA glides   G2-3 patellar mobs  Passive stretching  MT 15'  STM calf, HS, popliteal region  Hip flexor stretch off table with STM along quad  GII-III FT AP/PA glides   G2-3 patellar mobs  Passive stretching      NR 15'  Gait training: emphasis on increased knee flexion, and heel to toe contact  Adjust cane height              HEP:   - Supine Quad Set  - 2 x daily - 7 x weekly - 2 sets - 10 reps - 3 second hold  - Seated Hamstring Stretch  - 3 x daily - 7 x weekly - 3 sets - 10-15 seconds hold  - Seated Heel Slide  - 2 x daily - 7 x weekly - 2 sets - 10 reps  - Sit to Stand  - 3 x daily - 7 x weekly - 10 reps  - Supine Diaphragmatic Breathing  - 1 x daily - 7 x weekly  - Supine Lower Trunk Rotation  - 2 x daily - 7 x weekly - 20 reps  - Supine Heel Slide with Strap  - 1-2 x daily - 7 x weekly - 2 sets - 10 reps  - Supine Single Leg Ankle Pumps  - 2-3 x daily - 7 x weekly - 30 reps  - Supine Gluteal Sets  - 2-3 x daily - 7 x weekly - 3 sets - 10 reps  - Standing Weight Shift Side to Side  - 2 x daily - 7 x weekly - 2 sets - 10 reps  - Standing Weight Shifting Forward and Backward  - 2 x daily - 7 x weekly - 2 sets - 10 reps  - Seated Lumbar Flexion Stretch  - 3-4 x daily - 7 x weekly - 10 reps    Charges: 2 TE, 1 MT       Total Timed Treatment: 45 min  Total Treatment Time: 45 min

## 2023-12-07 ENCOUNTER — OFFICE VISIT (OUTPATIENT)
Dept: PHYSICAL THERAPY | Facility: HOSPITAL | Age: 58
End: 2023-12-07
Attending: ORTHOPAEDIC SURGERY
Payer: MEDICAID

## 2023-12-07 PROCEDURE — 97140 MANUAL THERAPY 1/> REGIONS: CPT

## 2023-12-07 PROCEDURE — 97110 THERAPEUTIC EXERCISES: CPT

## 2023-12-08 NOTE — PROGRESS NOTES
Diagnosis:   Orthopedic aftercare (Z47.89)  Primary osteoarthritis of left knee (M17.12)  S/P TKR (total knee replacement) using cement, left (T92.911)      Referring Provider: Darwin Chase  Date of Evaluation:    11/24/2023    Precautions:  Diabetes Next MD visit: Need to schedule    Date of Surgery: 11/3/2023           Insurance Primary/Secondary: Vic Morenow / N/A     # Auth Visits: 15            Subjective: Was not too sore after Tuesday, thinks maybe she could have done more. Has noticed that swelling is maybe a little better. Pain: 4/10 L knee       Objective: 125 knee flex; -2 knee ext     Strength/MMT: (* denotes performed with pain): TBD    Balance: SLS: R 27 sec, L 3 sec    Functional Mobility:  5x sit<>stand: 19 sec (increased WB on right leg)  TUG (AD, time): 18 sec  SPC    Initial eval:  Observation/Skin: erythema doris incision, several scabs along incision. Increased swelling around the knee. Palpation: warmth, 1-2+ pitting edema, tenderness along incision, and to nearby muscles  Edema: at knee jt line R: 49.5 cm, L 41.5 cm  Sensation: grossly intact, diminished doris-inscision, pt notes tingling in her toes (likely related to diabetic neuropathy)    AROM: (* denotes performed with pain)   Knee    Flexion: R 121; L 105   Extension: R -2; L -4     PROM: (* denotes performed with pain)   Knee    Flexion: R NT; L 108   Extension: R NT; L -2     Patellar Mobility/Accessory motion: sup/inf mobility limited L    Gait: pt ambulates on level ground with SPC, increased WB on R, increased flexion throughout. Assessment: Increased knee pain noted with anne stepping but improved knee flexion with ambulation after exercise. Ambulation without AD demonstrates increased lateral trunk sway, decreased hip extension and decreased knee flexion, which pt is able to adjust with cueing.  ROM progressing well, remaining flexion deficits possibly due to edema, reiterating to patient the importance of icing and elevating at home, pt agreeable. Goals:   Goals: (To be met in 18 visits)   Pt will improve knee extension ROM to 0 deg to allow proper heel strike during gait and terminal knee extension in stance  Pt will improve knee AROM flexion to >120 degrees to improve ability to perform stair negotiation  Pt will improve quad strength to 5/5 to ascend 1 flight of stairs reciprocally without UE assist  Pt will increase hip and knee strength to grossly 4+/5 to be able to get up and down from the floor safely  Pt will demonstrate increased hip ER/ABD strength to 4+/5 to perform stepping and squatting activities without excessive femoral IR/ADD  Pt will improve SLS to >10s to improve safety and independence with gait on uneven surfaces such as grass  Pt will be independent and compliant with comprehensive HEP to maintain progress achieved in PT    Plan: Patient will be seen for 2 x/week or a total of 18 visits over a 90 day period. Treatment will include: Gait training, Manual Therapy, Neuromuscular Re-education, Therapeutic Activities, Therapeutic Exercise, and Home Exercise Program instruction  Date: 11/28/2023  TX#: 2/15-18 Date: 11/30/2023            TX#: 3/15-18 Date:    12/5/2023              TX#: 4/15-18 Date:  12/7/2023                TX#: 5/15-18 Date:    Tx#: 6/ TE 20'  Assessment: 5xSTS, TUG, SLS  Sit to stands x10  Seated forward flexion x20  DKTC SB x3'  SAQ x3' (reminders to breath and slow down)  Attempt LAQ: too difficult   Seated heel and toe raises x20  Pt edu: risks and signs and symptoms of DVT, lumbar spine radiculopathy symptoms vs knee symptoms TE 23'  Nustep lvl 3 x5' for ROM and low back relief  ROM assessment  DKTC SB x3'  SAQ x2' (reminders to breath and slow down)  SLR x10 L   Sit to stands x15  Pt edu: use of tubi- during day and evening  TE: 30'  Nustep lvl 3 x5' for ROM and low back relief  DKTC SB x3'  Long Sit calf stretch 3'  SLR x10 L   Sit to stands (RLE forward) x15  4in step up/down LLE x4 lap   6in step up, 1UE x12   Double Leg Press 30# (light weight d/t fatigue) x25      TE: 38'   Nustep lvl 5 x5'   Double Leg Press 42# -->50# 3x10  DKTC SB x30  Supine PROM x3'   Supine Bridging x20  L3 [place] stretch 3x30s  6 in anne stepping at ballet bar, step to R/L lead 0UE x5 lap   6in anne step, lateral (6/10 pain with L direction) 0UE x3 lap  Hip abduction slider R/L x20ea  Long Sit calf stretch 3'      MT 15'  STM calf, HS, popliteal region  Hip flexor stretch off table with STM along quad  G2-3 patellar mobs  Passive stretching  MT 20'  STM calf, HS, popliteal region  Hip flexor stretch off table with STM along quad  GII-III FT AP/PA glides   G2-3 patellar mobs  Passive stretching  MT 15'  STM calf, HS, popliteal region  Hip flexor stretch off table with STM along quad  GII-III FT AP/PA glides   G2-3 patellar mobs  Passive stretching  MT: 8 '   STM calf, HS, popliteal region  GII-III FT AP/PA glides   G2-3 patellar mobs    NR 15'  Gait training: emphasis on increased knee flexion, and heel to toe contact  Adjust cane height              HEP:   - Supine Quad Set  - 2 x daily - 7 x weekly - 2 sets - 10 reps - 3 second hold  - Seated Hamstring Stretch  - 3 x daily - 7 x weekly - 3 sets - 10-15 seconds hold  - Seated Heel Slide  - 2 x daily - 7 x weekly - 2 sets - 10 reps  - Sit to Stand  - 3 x daily - 7 x weekly - 10 reps  - Supine Diaphragmatic Breathing  - 1 x daily - 7 x weekly  - Supine Lower Trunk Rotation  - 2 x daily - 7 x weekly - 20 reps  - Supine Heel Slide with Strap  - 1-2 x daily - 7 x weekly - 2 sets - 10 reps  - Supine Single Leg Ankle Pumps  - 2-3 x daily - 7 x weekly - 30 reps  - Supine Gluteal Sets  - 2-3 x daily - 7 x weekly - 3 sets - 10 reps  - Standing Weight Shift Side to Side  - 2 x daily - 7 x weekly - 2 sets - 10 reps  - Standing Weight Shifting Forward and Backward  - 2 x daily - 7 x weekly - 2 sets - 10 reps  - Seated Lumbar Flexion Stretch  - 3-4 x daily - 7 x weekly - 10 reps    Charges: 3 TE, 1 MT       Total Timed Treatment: 46 min  Total Treatment Time: 46 min

## 2023-12-11 ENCOUNTER — OFFICE VISIT (OUTPATIENT)
Dept: PODIATRY CLINIC | Facility: CLINIC | Age: 58
End: 2023-12-11
Payer: MEDICAID

## 2023-12-11 DIAGNOSIS — E11.9 TYPE 2 DIABETES MELLITUS WITHOUT COMPLICATION, UNSPECIFIED WHETHER LONG TERM INSULIN USE (HCC): Primary | ICD-10-CM

## 2023-12-11 PROCEDURE — 99203 OFFICE O/P NEW LOW 30 MIN: CPT | Performed by: STUDENT IN AN ORGANIZED HEALTH CARE EDUCATION/TRAINING PROGRAM

## 2023-12-11 NOTE — PROGRESS NOTES
[facility], [facility]iatry  Progress Note      Alessandro Alfaro is a 62year old female. Chief Complaint   Patient presents with    Ingrown Toenail     Consult- Bilateral hallux - Pt states on and off pain - L hallus is worse  - Denies any drainage -  FBS-  not taken thi AM - A1C- 6.6 ON 11/10 - Pt here with her daughter. HPI:     Patient is a pleasant 60-year-old diabetic female presents to clinic today for bilateral foot evaluation. Patient admits to having partial nail avulsion to past.  She admits tenderness left great toe. Allergies: Patient has no known allergies. Current Outpatient Medications   Medication Sig Dispense Refill    HYDROcodone-acetaminophen 7.5-325 MG Oral Tab Take 1 tablet by mouth every 6 (six) hours as needed for Pain. Maximum dose of acetaminophen is 4000 mg from all sources in 24 hours. 25 tablet 0    aspirin 325 MG Oral Tab EC Take 1 tablet (325 mg total) by mouth in the morning and 1 tablet (325 mg total) before bedtime. Do not crush. After 30 days, resume once daily aspirin 81 mg as you were doing before surgery. . 60 tablet 0    acetaminophen 325 MG Oral Tab Take 2 tablets (650 mg total) by mouth every 8 (eight) hours as needed for Pain. No more than 4000 mg acetaminophen/Tylenol per day from all sources. Recall that each Norco has 325 mg of Tylenol in it. 60 tablet 0    calcium carbonate-vitamin D 250-3. 125 MG-MCG Oral Tab Take 1 tablet by mouth 2 (two) times daily with meals. 60 tablet 0    docusate sodium 100 MG Oral Cap Take 100 mg by mouth 2 (two) times daily. Do not crush. Stop if loose stool. 20 capsule 0    multivitamin Oral Chew Tab Chew 1 tablet by mouth daily. 60 tablet 0    Dulaglutide (TRULICITY) 3 JG/6.2NP Subcutaneous Solution Pen-injector Inject 3 mg into the skin once a week. 2 mL 2    gabapentin 300 MG Oral Cap Take 1 capsule (300 mg total) by mouth nightly.  30 capsule 3    ONETOUCH ULTRA In Vitro Strip TEST TWICE DAILY 200 strip 1    ERGOCALCIFEROL 1.25 MG (13490 UT) Oral Cap TAKE 1 CAPSULE BY MOUTH 1 TIME A WEEK 8 capsule 0    metFORMIN HCl 1000 MG Oral Tab Take 1 tablet (1,000 mg total) by mouth 2 (two) times daily. Empagliflozin (JARDIANCE) 25 MG Oral Tab Take 25 mg by mouth every morning before breakfast. 90 tablet 0      Past Medical History:   Diagnosis Date    Diabetes ([facility] [address]     Diabetes mellitus ([facility] [address]     Esophageal reflux     Migraines     Osteoarthritis     RIGHT ARM PAIN, left knee      Past Surgical History:   Procedure Laterality Date    CHOLECYSTECTOMY      CYSTOSCOPY,INSERT URETERAL STENT      bladder sling 7 years ago    HYSTERECTOMY      TUBAL LIGATION        History reviewed. No pertinent family history. Social History     Socioeconomic History    Marital status:    Tobacco Use    Smoking status: Never    Smokeless tobacco: Never   Vaping Use    Vaping Use: Never used   Substance and Sexual Activity    Alcohol use: Not Currently    Drug use: Never    Sexual activity: Yes           REVIEW OF SYSTEMS:     Denies nause, fever, chills  No calf pain  Denies chest pain or SOB      EXAM:   There were no vitals taken for this visit. GENERAL: well developed, well nourished, in no apparent distress  EXTREMITIES:   1. Integument: Normal skin temperature and turgor  2. Vascular: Dorsalis pedis two out of four bilateral and posterior tibial pulses two out of   four bilateral, capillary refill normal.   3. Musculoskeletal: All muscle groups are graded 5 out of 5 in the foot and ankle. Tenderness with palpation to bilateral hallux toenails   4. Neurological: Normal sharp dull sensation; reflexes normal.             ASSESSMENT AND PLAN:   Diagnoses and all orders for this visit:    Type 2 diabetes mellitus without complication, unspecified whether long term insulin use ([facility] [address]        Plan:       -Patient examined, chart history reviewed.   -Discussed importance of proper pedal hygiene, regular foot checks, and tight glucose control.  -Sharply debrided nails x10 with a sterile nail nipper achieving a 20% reduction in thickness and length, without incident.   -Discussed possibility of nail avulsion with chemical matrixectomy to left hallux toenail if symptoms don't improve with nail trimming.   -Ambulate with supportive shoes and inserts and avoid walking barefoot.  -Educated patient on acute signs of infection advised patient to seek immediate medical attention if symptoms arise. RTC in 9 weeks . The patient indicates understanding of these issues and agrees to the plan.         Zita Cassidy DPM

## 2023-12-13 NOTE — PROGRESS NOTES
Diagnosis:   Orthopedic aftercare (Z47.89)  Primary osteoarthritis of left knee (M17.12)  S/P TKR (total knee replacement) using cement, left (G70.116)      Referring Provider: Lynn Rivera  Date of Evaluation:    11/24/2023    Precautions:  Diabetes Next MD visit: Need to schedule    Date of Surgery: 11/3/2023           Insurance Primary/Secondary: BLUE CROSS MEDICAID / N/A     # Auth Visits: 15            Subjective: Pt was sore after last session, started doing it at home. Pain comes and goes, when it's there it throbs. Feels better with stretching, but needs to make sure she relaxes. Pain: 8/10 L leg       Objective: 125 knee flex; -2 knee ext     Strength/MMT: (* denotes performed with pain): TBD    Balance: SLS: R 27 sec, L 3 sec    Functional Mobility:  5x sit<>stand: 19 sec (increased WB on right leg)  TUG (AD, time): 18 sec  SPC    Initial eval:  Observation/Skin: erythema doris incision, several scabs along incision. Increased swelling around the knee. Palpation: warmth, 1-2+ pitting edema, tenderness along incision, and to nearby muscles  Edema: at knee jt line R: 49.5 cm, L 41.5 cm  Sensation: grossly intact, diminished doris-inscision, pt notes tingling in her toes (likely related to diabetic neuropathy)    AROM: (* denotes performed with pain)   Knee    Flexion: R 121; L 105   Extension: R -2; L -4     PROM: (* denotes performed with pain)   Knee    Flexion: R NT; L 108   Extension: R NT; L -2     Patellar Mobility/Accessory motion: sup/inf mobility limited L    Gait: pt ambulates on level ground with SPC, increased WB on R, increased flexion throughout. Assessment: Pt is making good progress with PT- ongoing lateral lean with SLS tasks. Updated HEP to include clams and SL hip abduction to improve hip stabilization in SLS. Focused on LE strengthening today, noted increased pain in her left buttock, improved with seated piriformis stretch and forward flexion stretching.      Goals:   Goals: (To be met in 18 visits)   Pt will improve knee extension ROM to 0 deg to allow proper heel strike during gait and terminal knee extension in stance  Pt will improve knee AROM flexion to >120 degrees to improve ability to perform stair negotiation  Pt will improve quad strength to 5/5 to ascend 1 flight of stairs reciprocally without UE assist  Pt will increase hip and knee strength to grossly 4+/5 to be able to get up and down from the floor safely  Pt will demonstrate increased hip ER/ABD strength to 4+/5 to perform stepping and squatting activities without excessive femoral IR/ADD  Pt will improve SLS to >10s to improve safety and independence with gait on uneven surfaces such as grass  Pt will be independent and compliant with comprehensive HEP to maintain progress achieved in PT    Plan: Patient will be seen for 2 x/week or a total of 18 visits over a 90 day period.   Treatment will include: Gait training, Manual Therapy, Neuromuscular Re-education, Therapeutic Activities, Therapeutic Exercise, and Home Exercise Program instruction  Date: 11/28/2023  TX#: 2/15-18 Date: 11/30/2023            TX#: 3/15-18 Date:    12/5/2023              TX#: 4/15-18 Date:  12/7/2023                TX#: 5/15-18 Date: 12/14/23  Tx#: 6/15-18   TE 20'  Assessment: 5xSTS, TUG, SLS  Sit to stands x10  Seated forward flexion x20  TC SB x3'  SAQ x3' (reminders to breath and slow down)  Attempt LAQ: too difficult   Seated heel and toe raises x20  Pt edu: risks and signs and symptoms of DVT, lumbar spine radiculopathy symptoms vs knee symptoms TE 23'  Nustep lvl 3 x5' for ROM and low back relief  ROM assessment  DKTC SB x3'  SAQ x2' (reminders to breath and slow down)  SLR x10 L   Sit to stands x15  Pt edu: use of tubi- during day and evening  TE: 30'  Nustep lvl 3 x5' for ROM and low back relief  DKTC SB x3'  Long Sit calf stretch 3'  SLR x10 L   Sit to stands (RLE forward) x15  4in step up/down LLE x4 lap   6in step up, 1UE x12   Double Leg Press 30# (light weight d/t fatigue) x25      TE: 38'   Nustep lvl 5 x5'   Double Leg Press 42# -->50# 3x10  DKTC SB x30  Supine PROM x3'   Supine Bridging x20  L3 Parminder stretch 3x30s  6 in anne stepping at ballet bar, step to R/L lead 0UE x5 lap   6in anne step, lateral (6/10 pain with L direction) 0UE x3 lap  Hip abduction slider R/L x20ea  Long Sit calf stretch 3'   TE: 45'   Nustep lvl 5 x6'   Double Leg Press 50# 3x10  4\" step ups x10 (more easier); 6\" step up x10   Stair negotiation review  Lateral step up/down 4\" x20  L3 Reserve stretch x2'  Seated flexion stretching x10  Seated piriformis stretch L x1'  SL hip abduction x20 B  SL clams x20 B  6 in anne stepping at ballet bar, step to R/L lead x4-5 laps; reciprocal x1 intermittent use of UE support  6in anne step, lateral x4-5 laps   MT 15'  STM calf, HS, popliteal region  Hip flexor stretch off table with STM along quad  G2-3 patellar mobs  Passive stretching  MT 20'  STM calf, HS, popliteal region  Hip flexor stretch off table with STM along quad  GII-III FT AP/PA glides   G2-3 patellar mobs  Passive stretching  MT 15'  STM calf, HS, popliteal region  Hip flexor stretch off table with STM along quad  GII-III FT AP/PA glides   G2-3 patellar mobs  Passive stretching  MT: 8 '   STM calf, HS, popliteal region  GII-III FT AP/PA glides   G2-3 patellar mobs    NR 15'  Gait training: emphasis on increased knee flexion, and heel to toe contact  Adjust cane height              HEP:   - Supine Quad Set  - 2 x daily - 7 x weekly - 2 sets - 10 reps - 3 second hold  - Seated Hamstring Stretch  - 3 x daily - 7 x weekly - 3 sets - 10-15 seconds hold  - Seated Heel Slide  - 2 x daily - 7 x weekly - 2 sets - 10 reps  - Sit to Stand  - 3 x daily - 7 x weekly - 10 reps  - Supine Diaphragmatic Breathing  - 1 x daily - 7 x weekly  - Supine Lower Trunk Rotation  - 2 x daily - 7 x weekly - 20 reps  - Supine Heel Slide with Strap  - 1-2 x daily - 7 x weekly - 2 sets - 10 reps  - Supine Single Leg Ankle Pumps  - 2-3 x daily - 7 x weekly - 30 reps  - Supine Gluteal Sets  - 2-3 x daily - 7 x weekly - 3 sets - 10 reps  - Standing Weight Shift Side to Side  - 2 x daily - 7 x weekly - 2 sets - 10 reps  - Standing Weight Shifting Forward and Backward  - 2 x daily - 7 x weekly - 2 sets - 10 reps  - Seated Lumbar Flexion Stretch  - 3-4 x daily - 7 x weekly - 10 reps  - Sidelying Hip Abduction  - 1 x daily - 5 x weekly - 1-2 sets - 10 reps  - Clamshell  - 1 x daily - 5 x weekly - 1-2 sets - 10 reps    Charges: 3 TE       Total Timed Treatment: 45 min  Total Treatment Time: 45 min

## 2023-12-14 ENCOUNTER — OFFICE VISIT (OUTPATIENT)
Dept: PHYSICAL THERAPY | Facility: HOSPITAL | Age: 58
End: 2023-12-14
Attending: ORTHOPAEDIC SURGERY
Payer: MEDICAID

## 2023-12-14 PROCEDURE — 97110 THERAPEUTIC EXERCISES: CPT

## 2023-12-14 RX ORDER — GABAPENTIN 300 MG/1
300 CAPSULE ORAL NIGHTLY
Qty: 30 CAPSULE | Refills: 3 | Status: SHIPPED | OUTPATIENT
Start: 2023-12-14

## 2023-12-14 NOTE — TELEPHONE ENCOUNTER
LOV: 9/8/23  Last Refilled:#30, 3rfs 9/8/23    Future Appointments   Date Time Provider Apolinar Iwona   12/14/2023 11:00 AM Mckenna Santosa Lobe [facility]   12/20/2023 12:45 PM CreviolettaiusGabytta Colt, PT [facility]   12/22/2023  1:30 PM Cremerius, Lovetta Colt, PT [facility]   12/26/2023  4:15 PM Cremerius, Lovetta Colt, PT [facility]   12/28/2023  4:15 PM Cremerius Lovetta Colt, PT [facility]   1/2/2024  1:15 PM Cremerius Lovetta Colt, PT [facility]   1/4/2024 12:30 PM CreviolettaiusGabytta Colt, PT [facility]   1/9/2024 11:00 AM David Santos, PT [facility]   1/22/2024  2:50 PM Donald Fernandez MD [address] [place] [address] OF [facility]   1/23/2024 10:30 AM Anthony Prasad MD [facility] OF [facility]   2/12/2024  9:50 AM Lexi Turner DPM [facility]   4/30/2024 11:00 AM Lindsey Villeda MD [address] [place] [address]     Summary:   Prednsione 5mg x 3 days, then off   Work up normal   Try gabapentin 300mg at night   Return to clinic in 2323 Pita Hemphill MD  9/8/2023   2:26 PM  - [place]ed [place]- [place]  through Epic      Please advise.

## 2023-12-18 DIAGNOSIS — M17.12 PRIMARY OSTEOARTHRITIS OF LEFT KNEE: ICD-10-CM

## 2023-12-18 DIAGNOSIS — Z96.652 S/P TKR (TOTAL KNEE REPLACEMENT) USING CEMENT, LEFT: ICD-10-CM

## 2023-12-18 RX ORDER — HYDROCODONE BITARTRATE AND ACETAMINOPHEN 7.5; 325 MG/1; MG/1
1 TABLET ORAL EVERY 6 HOURS PRN
Qty: 25 TABLET | Refills: 0 | Status: SHIPPED | OUTPATIENT
Start: 2023-12-18

## 2023-12-18 NOTE — PROGRESS NOTES
Diagnosis:   Orthopedic aftercare (Z47.89)  Primary osteoarthritis of left knee (M17.12)  S/P TKR (total knee replacement) using cement, left (L41.602)      Referring Provider: Monica Miller  Date of Evaluation:    11/24/2023    Precautions:  Diabetes Next MD visit: 1/23/24    Date of Surgery: 11/3/2023           Insurance Primary/Secondary: BLUE CROSS MEDICAID / N/A     # Auth Visits: 15            Subjective: Entire left leg still hurts but her knee does feel better- no pain in her knee. Pain: 6/10 L leg       Objective:     Strength/MMT: (* denotes performed with pain):   Hip strength:  Flexion: R 5/5; L 5/5  Abduction: R 4+/5; L 4*/5  Extension: R 4+/5; L 4*/5  ER: R 5/5; L 5/5  IR: R 5/5; L 5/5    Knee strength:   Flexion: R 5/5; L 4*/5  Extension: R 5/5; L 5/5     ----------    Balance: SLS: R 27 sec, L 3 sec    Functional Mobility:  5x sit<>stand: 19 sec (increased WB on right leg)  TUG (AD, time): 18 sec  SPC    Initial eval:  Observation/Skin: erythema doris incision, several scabs along incision. Increased swelling around the knee. Palpation: warmth, 1-2+ pitting edema, tenderness along incision, and to nearby muscles  Edema: at knee jt line R: 49.5 cm, L 41.5 cm  Sensation: grossly intact, diminished doris-inscision, pt notes tingling in her toes (likely related to diabetic neuropathy)    AROM: (* denotes performed with pain)   Knee    Flexion: R 121; L 105   Extension: R -2; L -4     PROM: (* denotes performed with pain)   Knee    Flexion: R NT; L 108   Extension: R NT; L -2     Patellar Mobility/Accessory motion: sup/inf mobility limited L    Gait: pt ambulates on level ground with SPC, increased WB on R, increased flexion throughout. Assessment: Pt is making good progress with PT- ongoing lateral lean with SLS tasks though is improving. She ambulated with antalgia, and today noted increased pain in her right knee.  She has ongoing radicular symptoms in her left leg- believe she will be following up with her rheumatologist with the back pain in January. Goals:   Goals: (To be met in 18 visits)   Pt will improve knee extension ROM to 0 deg to allow proper heel strike during gait and terminal knee extension in stance  Pt will improve knee AROM flexion to >120 degrees to improve ability to perform stair negotiation  Pt will improve quad strength to 5/5 to ascend 1 flight of stairs reciprocally without UE assist  Pt will increase hip and knee strength to grossly 4+/5 to be able to get up and down from the floor safely  Pt will demonstrate increased hip ER/ABD strength to 4+/5 to perform stepping and squatting activities without excessive femoral IR/ADD  Pt will improve SLS to >10s to improve safety and independence with gait on uneven surfaces such as grass  Pt will be independent and compliant with comprehensive HEP to maintain progress achieved in PT    Plan: Patient will be seen for 2 x/week or a total of 18 visits over a 90 day period.   Treatment will include: Gait training, Manual Therapy, Neuromuscular Re-education, Therapeutic Activities, Therapeutic Exercise, and Home Exercise Program instruction  6\" step up x10   Lateral step up/down 4\" x20  Date: 11/30/2023            TX#: 3/15-18 Date:    12/5/2023         TX#: 4/15-18 Date:  12/7/2023                TX#: 5/15-18 Date: 12/14/23  Tx#: 6/15-18 Date: 12/20/23  Tx#: 7/15   TE 23'  Nustep lvl 3 x5' for ROM and low back relief  ROM assessment  DKTC SB x3'  SAQ x2' (reminders to breath and slow down)  SLR x10 L   Sit to stands x15  Pt edu: use of tubi- during day and evening  TE: 30'  Nustep lvl 3 x5' for ROM and low back relief  DKTC SB x3'  Long Sit calf stretch 3'  SLR x10 L   Sit to stands (RLE forward) x15  4in step up/down LLE x4 lap   6in step up, 1UE x12   Double Leg Press 30# (light weight d/t fatigue) x25      TE: 38'   Nustep lvl 5 x5'   Double Leg Press 42# -->50# 3x10  DKTC SB x30  Supine PROM x3'   Supine Bridging x20  L3 Parminder stretch 3x30s  6 in anne stepping at ballet bar, step to R/L lead 0UE x5 lap   6in anne step, lateral (6/10 pain with L direction) 0UE x3 lap  Hip abduction slider R/L x20ea  Long Sit calf stretch 3'   TE: 45'   Nustep lvl 5 x6'   Double Leg Press 50# 3x10  4\" step ups x10 (more easier); 6\" step up x10   Stair negotiation review  Lateral step up/down 4\" x20  L3 Parminder stretch x2'  Seated flexion stretching x10  Seated piriformis stretch L x1'  SL hip abduction x20 B  SL clams x20 B  6 in anne stepping at ballet bar, step to R/L lead x4-5 laps; reciprocal x1 intermittent use of UE support  6in anne step, lateral x4-5 laps TE: 45'   Nustep lvl 5 x8'   Double Leg Press 50# 2x10, 55# x10  SL hip abduction x10 B  SL clams x20 B  Supine piriformis stretching L x2'  Seated forward flexion stretching x10  Strength assessment  Sitting HS curl TYB L 2x10   Side stepping YTB around thighs x2'   L3 Parminder stretch 5x10\"  6 in hurdles (no UE)  -step to R/L lead x2 laps  -reciprocal x2   -lateral x3 laps  Seated piriformis stretching bilaterally at end of session    MT 20'  STM calf, HS, popliteal region  Hip flexor stretch off table with STM along quad  GII-III FT AP/PA glides   G2-3 patellar mobs  Passive stretching  MT 15'  STM calf, HS, popliteal region  Hip flexor stretch off table with STM along quad  GII-III FT AP/PA glides   G2-3 patellar mobs  Passive stretching  MT: 8 '   STM calf, HS, popliteal region  GII-III FT AP/PA glides   G2-3 patellar mobs                   HEP:   - Supine Quad Set  - 2 x daily - 7 x weekly - 2 sets - 10 reps - 3 second hold  - Seated Hamstring Stretch  - 3 x daily - 7 x weekly - 3 sets - 10-15 seconds hold  - Seated Heel Slide  - 2 x daily - 7 x weekly - 2 sets - 10 reps  - Sit to Stand  - 3 x daily - 7 x weekly - 10 reps  - Supine Diaphragmatic Breathing  - 1 x daily - 7 x weekly  - Supine Lower Trunk Rotation  - 2 x daily - 7 x weekly - 20 reps  - Supine Heel Slide with Strap  - 1-2 x daily - 7 x weekly - 2 sets - 10 reps  - Supine Single Leg Ankle Pumps  - 2-3 x daily - 7 x weekly - 30 reps  - Supine Gluteal Sets  - 2-3 x daily - 7 x weekly - 3 sets - 10 reps  - Standing Weight Shift Side to Side  - 2 x daily - 7 x weekly - 2 sets - 10 reps  - Standing Weight Shifting Forward and Backward  - 2 x daily - 7 x weekly - 2 sets - 10 reps  - Seated Lumbar Flexion Stretch  - 3-4 x daily - 7 x weekly - 10 reps  - Sidelying Hip Abduction  - 1 x daily - 5 x weekly - 1-2 sets - 10 reps  - Clamshell  - 1 x daily - 5 x weekly - 1-2 sets - 10 reps    Charges: 3 TE       Total Timed Treatment: 45 min  Total Treatment Time: 45 min

## 2023-12-18 NOTE — TELEPHONE ENCOUNTER
S/p left TKA on 11/3/23  Last visit on 11/20/23  Next appt scheduled 1/23/24  Last rx given on 12/1/23 #25    Do you approve?

## 2023-12-20 ENCOUNTER — OFFICE VISIT (OUTPATIENT)
Dept: PHYSICAL THERAPY | Facility: HOSPITAL | Age: 58
End: 2023-12-20
Attending: ORTHOPAEDIC SURGERY
Payer: MEDICAID

## 2023-12-20 PROCEDURE — 97110 THERAPEUTIC EXERCISES: CPT

## 2023-12-21 NOTE — PROGRESS NOTES
Diagnosis:   Orthopedic aftercare (Z47.89)  Primary osteoarthritis of left knee (M17.12)  S/P TKR (total knee replacement) using cement, left (O00.593)      Referring Provider: Nessa Moreland  Date of Evaluation:    11/24/2023    Precautions:  Diabetes Next MD visit: 1/23/24    Date of Surgery: 11/3/2023           Insurance Primary/Secondary: BLUE CROSS MEDICAID / N/A     # Auth Visits: 15            Subjective: Knee feels good, but ongoing leg pain. Pain: 5/10 L leg       Objective: L leg shorter, elevated pelvic alignment    Strength/MMT: (* denotes performed with pain):   Hip strength:  Flexion: R 5/5; L 5/5  Abduction: R 4+/5; L 4*/5  Extension: R 4+/5; L 4*/5  ER: R 5/5; L 5/5  IR: R 5/5; L 5/5    Knee strength:   Flexion: R 5/5; L 4*/5  Extension: R 5/5; L 5/5     ----------    Balance: SLS: R 27 sec, L 3 sec    Functional Mobility:  5x sit<>stand: 19 sec (increased WB on right leg)  TUG (AD, time): 18 sec  SPC    Initial eval:  Observation/Skin: erythema doris incision, several scabs along incision. Increased swelling around the knee. Palpation: warmth, 1-2+ pitting edema, tenderness along incision, and to nearby muscles  Edema: at knee jt line R: 49.5 cm, L 41.5 cm  Sensation: grossly intact, diminished doris-inscision, pt notes tingling in her toes (likely related to diabetic neuropathy)    AROM: (* denotes performed with pain)   Knee    Flexion: R 121; L 105   Extension: R -2; L -4     PROM: (* denotes performed with pain)   Knee    Flexion: R NT; L 108   Extension: R NT; L -2     Patellar Mobility/Accessory motion: sup/inf mobility limited L    Gait: pt ambulates on level ground with SPC, increased WB on R, increased flexion throughout. Assessment: Continued LE strengthening, her knee is progressing well. Believe most of the remaining deficits are related to the back pain than the knee. With SLS on L, ongoing Trendelenburg. Will continue to focus on LE strength and balance.      At end of session assessed pelvic alignment: pt's left pelvis was slightly elevated causing L leg to be shorter- after manual work to correct pt symptoms improved significantly. Goals:   Goals: (To be met in 18 visits)   Pt will improve knee extension ROM to 0 deg to allow proper heel strike during gait and terminal knee extension in stance  Pt will improve knee AROM flexion to >120 degrees to improve ability to perform stair negotiation  Pt will improve quad strength to 5/5 to ascend 1 flight of stairs reciprocally without UE assist  Pt will increase hip and knee strength to grossly 4+/5 to be able to get up and down from the floor safely  Pt will demonstrate increased hip ER/ABD strength to 4+/5 to perform stepping and squatting activities without excessive femoral IR/ADD  Pt will improve SLS to >10s to improve safety and independence with gait on uneven surfaces such as grass  Pt will be independent and compliant with comprehensive HEP to maintain progress achieved in PT    Plan: Patient will be seen for 2 x/week or a total of 18 visits over a 90 day period.   Treatment will include: Gait training, Manual Therapy, Neuromuscular Re-education, Therapeutic Activities, Therapeutic Exercise, and Home Exercise Program instruction  6\" step up x10   Lateral step up/down 4\" x20  Date:    12/5/2023         TX#: 4/15-18 Date:  12/7/2023                TX#: 5/15-18 Date: 12/14/23  Tx#: 6/15-18 Date: 12/20/23  Tx#: 7/15 Date: 12/22/23  Tx#: 8/15   TE: 30'  Nustep lvl 3 x5' for ROM and low back relief  DKTC SB x3'  Long Sit calf stretch 3'  SLR x10 L   Sit to stands (RLE forward) x15  4in step up/down LLE x4 lap   6in step up, 1UE x12   Double Leg Press 30# (light weight d/t fatigue) x25      TE: 38'   Nustep lvl 5 x5'   Double Leg Press 42# -->50# 3x10  DKTC SB x30  Supine PROM x3'   Supine Bridging x20  L3 Parminder stretch 3x30s  6 in anne stepping at ballet bar, step to R/L lead 0UE x5 lap   6in anne step, lateral (6/10 pain with L direction) 0UE x3 lap  Hip abduction slider R/L x20ea  Long Sit calf stretch 3'   TE: 45'   Nustep lvl 5 x6'   Double Leg Press 50# 3x10  4\" step ups x10 (more easier); 6\" step up x10   Stair negotiation review  Lateral step up/down 4\" x20  L3 Parminder stretch x2'  Seated flexion stretching x10  Seated piriformis stretch L x1'  SL hip abduction x20 B  SL clams x20 B  6 in anne stepping at ballet bar, step to R/L lead x4-5 laps; reciprocal x1 intermittent use of UE support  6in anne step, lateral x4-5 laps TE: 45'   Nustep lvl 5 x8'   Double Leg Press 50# 2x10, 55# x10  SL hip abduction x10 B  SL clams x20 B  Supine piriformis stretching L x2'  Seated forward flexion stretching x10  Strength assessment  Sitting HS curl TyB L x20  Side stepping YTB around thighs x2'   L3 [place] stretch 5x10\"  6 in hurdles (no UE)  -step to R/L lead x2 laps  -reciprocal x2   -lateral x3 laps  Seated piriformis stretching bilaterally at end of session  TE: 23'   Nustep lvl 5 x5'   Double Leg Press 55# x20; 75# x20  Single Leg Press 37# x15 B   Sitting HS curl RTB L x20; GTB L x20   L3 [place] stretch 5x10\"   MT 15'  STM calf, HS, popliteal region  Hip flexor stretch off table with STM along quad  GII-III FT AP/PA glides   G2-3 patellar mobs  Passive stretching  MT: 8 '   STM calf, HS, popliteal region  GII-III FT AP/PA glides   G2-3 patellar mobs   NR 18'  Side stepping YTB around thighs x3 laps  Monster walks YTB around thighs x3 laps  Cone taps to promote SLS and hip stability  Ambultion with 10# kb in left hand to reduce L lean during L stance  Take away #: improved mechanics          MT 10'  LLD assessment: L leg slightly shorter, pelvis slightly elevated on left  Treated with bilat hip traction long axis with g4++  Bridge MET  After: sacrum assessment, PSIS level and equal depth  Sacral springing normal  Reported some discomfort is coccyx          HEP:   - Supine Quad Set  - 2 x daily - 7 x weekly - 2 sets - 10 reps - 3 second hold  - Seated Hamstring Stretch  - 3 x daily - 7 x weekly - 3 sets - 10-15 seconds hold  - Seated Heel Slide  - 2 x daily - 7 x weekly - 2 sets - 10 reps  - Sit to Stand  - 3 x daily - 7 x weekly - 10 reps  - Supine Diaphragmatic Breathing  - 1 x daily - 7 x weekly  - Supine Lower Trunk Rotation  - 2 x daily - 7 x weekly - 20 reps  - Supine Heel Slide with Strap  - 1-2 x daily - 7 x weekly - 2 sets - 10 reps  - Supine Single Leg Ankle Pumps  - 2-3 x daily - 7 x weekly - 30 reps  - Supine Gluteal Sets  - 2-3 x daily - 7 x weekly - 3 sets - 10 reps  - Standing Weight Shift Side to Side  - 2 x daily - 7 x weekly - 2 sets - 10 reps  - Standing Weight Shifting Forward and Backward  - 2 x daily - 7 x weekly - 2 sets - 10 reps  - Seated Lumbar Flexion Stretch  - 3-4 x daily - 7 x weekly - 10 reps  - Sidelying Hip Abduction  - 1 x daily - 5 x weekly - 1-2 sets - 10 reps  - Clamshell  - 1 x daily - 5 x weekly - 1-2 sets - 10 reps    Charges: 2 TE  1 NR 1 MT Total Timed Treatment: 51 min  Total Treatment Time: 51 min

## 2023-12-22 ENCOUNTER — OFFICE VISIT (OUTPATIENT)
Dept: PHYSICAL THERAPY | Facility: HOSPITAL | Age: 58
End: 2023-12-22
Attending: ORTHOPAEDIC SURGERY
Payer: MEDICAID

## 2023-12-22 PROCEDURE — 97140 MANUAL THERAPY 1/> REGIONS: CPT

## 2023-12-22 PROCEDURE — 97112 NEUROMUSCULAR REEDUCATION: CPT

## 2023-12-22 PROCEDURE — 97110 THERAPEUTIC EXERCISES: CPT

## 2023-12-26 ENCOUNTER — OFFICE VISIT (OUTPATIENT)
Dept: PHYSICAL THERAPY | Facility: HOSPITAL | Age: 58
End: 2023-12-26
Attending: ORTHOPAEDIC SURGERY
Payer: MEDICAID

## 2023-12-26 PROCEDURE — 97112 NEUROMUSCULAR REEDUCATION: CPT

## 2023-12-26 PROCEDURE — 97110 THERAPEUTIC EXERCISES: CPT

## 2023-12-26 NOTE — PROGRESS NOTES
Diagnosis:   Orthopedic aftercare (Z47.89)  Primary osteoarthritis of left knee (M17.12)  S/P TKR (total knee replacement) using cement, left (Z21.992)      Referring Provider: Jorge Hood  Date of Evaluation:    11/24/2023    Precautions:  Diabetes Next MD visit: 1/23/24    Date of Surgery: 11/3/2023           Insurance Primary/Secondary: BLUE CROSS MEDICAID / N/A     # Auth Visits: 15            Subjective: Pt went to chiropractor- adjusted her pelvis. He plans to work on her low back and pelvis 2-3x a week. Pain: 4/10       Objective: decreased lateral lean with ambulation    Strength/MMT: (* denotes performed with pain):   Hip strength:  Flexion: R 5/5; L 5/5  Abduction: R 4+/5; L 4*/5  Extension: R 4+/5; L 4*/5  ER: R 5/5; L 5/5  IR: R 5/5; L 5/5    Knee strength:   Flexion: R 5/5; L 4*/5  Extension: R 5/5; L 5/5     ----------    Balance: SLS: R 27 sec, L 3 sec    Functional Mobility:  5x sit<>stand: 19 sec (increased WB on right leg)  TUG (AD, time): 18 sec  SPC    Initial eval:  Observation/Skin: erythema doris incision, several scabs along incision. Increased swelling around the knee. Palpation: warmth, 1-2+ pitting edema, tenderness along incision, and to nearby muscles  Edema: at knee jt line R: 49.5 cm, L 41.5 cm  Sensation: grossly intact, diminished doris-inscision, pt notes tingling in her toes (likely related to diabetic neuropathy)    AROM: (* denotes performed with pain)   Knee    Flexion: R 121; L 105   Extension: R -2; L -4     PROM: (* denotes performed with pain)   Knee    Flexion: R NT; L 108   Extension: R NT; L -2     Patellar Mobility/Accessory motion: sup/inf mobility limited L    Gait: pt ambulates on level ground with SPC, increased WB on R, increased flexion throughout. Assessment: Pt started seeing chiropractor to address pelvic and low back dysfunction- she ambulates much better. Progressed resistance and added proprioceptive challenge to exercises today.  Will continue to progress as able. Goals:   Goals: (To be met in 18 visits)   Pt will improve knee extension ROM to 0 deg to allow proper heel strike during gait and terminal knee extension in stance  Pt will improve knee AROM flexion to >120 degrees to improve ability to perform stair negotiation  Pt will improve quad strength to 5/5 to ascend 1 flight of stairs reciprocally without UE assist  Pt will increase hip and knee strength to grossly 4+/5 to be able to get up and down from the floor safely  Pt will demonstrate increased hip ER/ABD strength to 4+/5 to perform stepping and squatting activities without excessive femoral IR/ADD  Pt will improve SLS to >10s to improve safety and independence with gait on uneven surfaces such as grass  Pt will be independent and compliant with comprehensive HEP to maintain progress achieved in PT    Plan: Patient will be seen for 2 x/week or a total of 18 visits over a 90 day period.   Treatment will include: Gait training, Manual Therapy, Neuromuscular Re-education, Therapeutic Activities, Therapeutic Exercise, and Home Exercise Program instruction  6\" step up x10   Lateral step up/down 4\" x20  Date:  12/7/2023                TX#: 5/15-18 Date: 12/14/23  Tx#: 6/15-18 Date: 12/20/23  Tx#: 7/15 Date: 12/22/23  Tx#: 8/15 Date: 12/26/23  Tx#: 9/15   TE: 38'   Nustep lvl 5 x5'   Double Leg Press 42# -->50# 3x10  DKTC SB x30  Supine PROM x3'   Supine Bridging x20  L3 Parmin[place] stretch 3x30s  6 in anne stepping at ballet bar, step to R/L lead 0UE x5 lap   6in anne step, lateral (6/10 pain with L direction) 0UE x3 lap  Hip abduction slider R/L x20ea  Long Sit calf stretch 3'   TE: 45'   Nustep lvl 5 x6'   Double Leg Press 50# 3x10  4\" step ups x10 (more easier); 6\" step up x10   Stair negotiation review  Lateral step up/down 4\" x20  L3 Parminder stretch x2'  Seated flexion stretching x10  Seated piriformis stretch L x1'  SL hip abduction x20 B  SL clams x20 B  6 in anne stepping at ballet bar, step to R/L lead x4-5 laps; reciprocal x1 intermittent use of UE support  6in anne step, lateral x4-5 laps TE: 45'   Nustep lvl 5 x8'   Double Leg Press 50# 2x10, 55# x10  SL hip abduction x10 B  SL clams x20 B  Supine piriformis stretching L x2'  Seated forward flexion stretching x10  Strength assessment  Sitting HS curl TyB L x20  Side stepping YTB around thighs x2'   L3 [place] stretch 5x10\"  6 in hurdles (no UE)  -step to R/L lead x2 laps  -reciprocal x2   -lateral x3 laps  Seated piriformis stretching bilaterally at end of session  TE: 23'   Nustep lvl 5 x5'   Double Leg Press 55# x20; 75# x20  Single Leg Press 37# x15 B   Sitting HS curl RTB L x20; GTB L x20   L3 [place] stretch 5x10\" TE: 15'   Nustep lvl 5 x5'   Double Leg Press 75# x20  Single Leg Press 42# x15 B    MT: 8 '   STM calf, HS, popliteal region  GII-III FT AP/PA glides   G2-3 patellar mobs   NR 18'  Side stepping YTB around thighs x3 laps  Monster walks YTB around thighs x3 laps  Cone taps to promote SLS and hip stability  Ambultion with 10# kb in left hand to reduce L lean during L stance  Take away #: improved mechanics    NR 28'  Side stepping YTB around ankles x3 laps  Monster walks YTB around thighs x3 laps  Tiltboard taps ML/AP x2' ea   Attempt balance between   6\" hurdles fwd step to with 2 blue airex pads  Side stepping on blue foam balance beam  +3 hurdles      MT 10'  LLD assessment: L leg slightly shorter, pelvis slightly elevated on left  Treated with bilat hip traction long axis with g4++  Bridge MET  After: sacrum assessment, PSIS level and equal depth  Sacral springing normal  Reported some discomfort is coccyx           HEP:   - Supine Quad Set  - 2 x daily - 7 x weekly - 2 sets - 10 reps - 3 second hold  - Seated Hamstring Stretch  - 3 x daily - 7 x weekly - 3 sets - 10-15 seconds hold  - Seated Heel Slide  - 2 x daily - 7 x weekly - 2 sets - 10 reps  - Sit to Stand  - 3 x daily - 7 x weekly - 10 reps  - Supine Diaphragmatic Breathing  - 1 x daily - 7 x weekly  - Supine Lower Trunk Rotation  - 2 x daily - 7 x weekly - 20 reps  - Supine Heel Slide with Strap  - 1-2 x daily - 7 x weekly - 2 sets - 10 reps  - Supine Single Leg Ankle Pumps  - 2-3 x daily - 7 x weekly - 30 reps  - Supine Gluteal Sets  - 2-3 x daily - 7 x weekly - 3 sets - 10 reps  - Standing Weight Shift Side to Side  - 2 x daily - 7 x weekly - 2 sets - 10 reps  - Standing Weight Shifting Forward and Backward  - 2 x daily - 7 x weekly - 2 sets - 10 reps  - Seated Lumbar Flexion Stretch  - 3-4 x daily - 7 x weekly - 10 reps  - Sidelying Hip Abduction  - 1 x daily - 5 x weekly - 1-2 sets - 10 reps  - Clamshell  - 1 x daily - 5 x weekly - 1-2 sets - 10 reps    Charges: 1 TE  2 NR  Total Timed Treatment: 43 min  Total Treatment Time: 43 min

## 2023-12-27 NOTE — PROGRESS NOTES
Diagnosis:   Orthopedic aftercare (Z47.89)  Primary osteoarthritis of left knee (M17.12)  S/P TKR (total knee replacement) using cement, left (W87.404)      Referring Provider: Dante Nunez  Date of Evaluation:    11/24/2023    Precautions:  Diabetes Next MD visit: 1/23/24    Date of Surgery: 11/3/2023           Insurance Primary/Secondary: BLUE CROSS MEDICAID / N/A     # Auth Visits: 15            Subjective: Pt is having more pain in the knee today. Back and pain in leg is much better. Pain: 5/10       Objective: decreased lateral lean with ambulation    Strength/MMT: (* denotes performed with pain):   Hip strength:  Flexion: R 5/5; L 5/5  Abduction: R 4+/5; L 4*/5  Extension: R 4+/5; L 4*/5  ER: R 5/5; L 5/5  IR: R 5/5; L 5/5    Knee strength:   Flexion: R 5/5; L 4*/5  Extension: R 5/5; L 5/5     ----------    Balance: SLS: R 27 sec, L 3 sec    Functional Mobility:  5x sit<>stand: 19 sec (increased WB on right leg)  TUG (AD, time): 18 sec  SPC    Initial eval:  Observation/Skin: erythema doris incision, several scabs along incision. Increased swelling around the knee. Palpation: warmth, 1-2+ pitting edema, tenderness along incision, and to nearby muscles  Edema: at knee jt line R: 49.5 cm, L 41.5 cm  Sensation: grossly intact, diminished doris-inscision, pt notes tingling in her toes (likely related to diabetic neuropathy)    AROM: (* denotes performed with pain)   Knee    Flexion: R 121; L 105   Extension: R -2; L -4     PROM: (* denotes performed with pain)   Knee    Flexion: R NT; L 108   Extension: R NT; L -2     Patellar Mobility/Accessory motion: sup/inf mobility limited L    Gait: pt ambulates on level ground with SPC, increased WB on R, increased flexion throughout. Assessment: Pt tolerated new exercises well- but reported some new right knee pain since last session. She wants to avoid TKA on the R so she has been performing her exercises on both legs.  She is making great progress with her L knee, will plan to complete remaining visits and DC. Goals:   Goals: (To be met in 18 visits)   Pt will improve knee extension ROM to 0 deg to allow proper heel strike during gait and terminal knee extension in stance  Pt will improve knee AROM flexion to >120 degrees to improve ability to perform stair negotiation  Pt will improve quad strength to 5/5 to ascend 1 flight of stairs reciprocally without UE assist  Pt will increase hip and knee strength to grossly 4+/5 to be able to get up and down from the floor safely  Pt will demonstrate increased hip ER/ABD strength to 4+/5 to perform stepping and squatting activities without excessive femoral IR/ADD  Pt will improve SLS to >10s to improve safety and independence with gait on uneven surfaces such as grass  Pt will be independent and compliant with comprehensive HEP to maintain progress achieved in PT    Plan: Patient will be seen for 2 x/week or a total of 18 visits over a 90 day period.   Treatment will include: Gait training, Manual Therapy, Neuromuscular Re-education, Therapeutic Activities, Therapeutic Exercise, and Home Exercise Program instruction  Date:  12/7/2023                TX#: 5/15-18 Date: 12/14/23  Tx#: 6/15-18 Date: 12/20/23  Tx#: 7/15 Date: 12/22/23  Tx#: 8/15 Date: 12/26/23  Tx#: 9/15 Date: 12/28/23  Tx#: 10/15   TE: 38'   Nustep lvl 5 x5'   Double Leg Press 42# -->50# 3x10  DKTC SB x30  Supine PROM x3'   Supine Bridging x20  L3 Parmin[place] stretch 3x30s  6 in anne stepping at ballet bar, step to R/L lead 0UE x5 lap   6in anne step, lateral (6/10 pain with L direction) 0UE x3 lap  Hip abduction slider R/L x20ea  Long Sit calf stretch 3'   TE: 45'   Nustep lvl 5 x6'   Double Leg Press 50# 3x10  4\" step ups x10 (more easier); 6\" step up x10   Stair negotiation review  Lateral step up/down 4\" x20  L3 [place] stretch x2'  Seated flexion stretching x10  Seated piriformis stretch L x1'  SL hip abduction x20 B  SL clams x20 B  6 in anne stepping at ballet bar, step to R/L lead x4-5 laps; reciprocal x1 intermittent use of UE support  6in anne step, lateral x4-5 laps TE: 45'   Nustep lvl 5 x8'   Double Leg Press 50# 2x10, 55# x10  SL hip abduction x10 B  SL clams x20 B  Supine piriformis stretching L x2'  Seated forward flexion stretching x10  Strength assessment  Sitting HS curl TyB L x20  Side stepping YTB around thighs x2'   L3 [place] stretch 5x10\"  6 in hurdles (no UE)  -step to R/L lead x2 laps  -reciprocal x2   -lateral x3 laps  Seated piriformis stretching bilaterally at end of session  TE: 23'   Nustep lvl 5 x5'   Double Leg Press 55# x20; 75# x20  Single Leg Press 37# x15 B   Sitting HS curl RTB L x20; GTB L x20   L3 [place] stretch 5x10\" TE: 15'   Nustep lvl 5 x5'   Double Leg Press 75# x20  Single Leg Press 42# x15 B  TE: 20'   Nustep lvl 5 x5'   Double Leg Press 75# x20  Single Leg Press 42# x20 L  Step up 6\">> 4\" x15 L  Lat step up/down 4\" x15 L   Heel raise off 1/2 FR x20    MT: 8 '   STM calf, HS, popliteal region  GII-III FT AP/PA glides   G2-3 patellar mobs   NR 18'  Side stepping YTB around thighs x3 laps  Monster walks YTB around thighs x3 laps  Cone taps to promote SLS and hip stability  Ambultion with 10# kb in left hand to reduce L lean during L stance  Take away #: improved mechanics    NR 28'  Side stepping YTB around ankles x3 laps  Monster walks YTB around thighs x3 laps  Tiltboard taps ML/AP x2' ea   Attempt balance between   6\" hurdles fwd step to with 2 blue airex pads  Side stepping on blue foam balance beam  +3 hurdles NR 10'  2\" step down L x20  SLS on blue airex + cone taps   Minisquat on blue airex x20   Hip abd and ext x10 B      MT 10'  LLD assessment: L leg slightly shorter, pelvis slightly elevated on left  Treated with bilat hip traction long axis with g4++  Bridge MET  After: sacrum assessment, PSIS level and equal depth  Sacral springing normal  Reported some discomfort is coccyx  MT  10'  STM along L knee  Patellar mobs  GIII-IV hip traction ++           HEP:   - Supine Quad Set  - 2 x daily - 7 x weekly - 2 sets - 10 reps - 3 second hold  - Seated Hamstring Stretch  - 3 x daily - 7 x weekly - 3 sets - 10-15 seconds hold  - Seated Heel Slide  - 2 x daily - 7 x weekly - 2 sets - 10 reps  - Sit to Stand  - 3 x daily - 7 x weekly - 10 reps  - Supine Diaphragmatic Breathing  - 1 x daily - 7 x weekly  - Supine Lower Trunk Rotation  - 2 x daily - 7 x weekly - 20 reps  - Supine Heel Slide with Strap  - 1-2 x daily - 7 x weekly - 2 sets - 10 reps  - Supine Single Leg Ankle Pumps  - 2-3 x daily - 7 x weekly - 30 reps  - Supine Gluteal Sets  - 2-3 x daily - 7 x weekly - 3 sets - 10 reps  - Standing Weight Shift Side to Side  - 2 x daily - 7 x weekly - 2 sets - 10 reps  - Standing Weight Shifting Forward and Backward  - 2 x daily - 7 x weekly - 2 sets - 10 reps  - Seated Lumbar Flexion Stretch  - 3-4 x daily - 7 x weekly - 10 reps  - Sidelying Hip Abduction  - 1 x daily - 5 x weekly - 1-2 sets - 10 reps  - Clamshell  - 1 x daily - 5 x weekly - 1-2 sets - 10 reps    Charges: 1 TE  1 NR 1 MT Total Timed Treatment: 40 min  Total Treatment Time: 40 min

## 2023-12-28 ENCOUNTER — OFFICE VISIT (OUTPATIENT)
Dept: PHYSICAL THERAPY | Facility: HOSPITAL | Age: 58
End: 2023-12-28
Attending: ORTHOPAEDIC SURGERY
Payer: MEDICAID

## 2023-12-28 PROCEDURE — 97110 THERAPEUTIC EXERCISES: CPT

## 2023-12-28 PROCEDURE — 97140 MANUAL THERAPY 1/> REGIONS: CPT

## 2023-12-28 PROCEDURE — 97112 NEUROMUSCULAR REEDUCATION: CPT

## 2023-12-29 NOTE — PROGRESS NOTES
Diagnosis:   Orthopedic aftercare (Z47.89)  Primary osteoarthritis of left knee (M17.12)  S/P TKR (total knee replacement) using cement, left (Z96.652)      Referring Provider: Caridad  Date of Evaluation:    11/24/2023    Precautions:  Diabetes Next MD visit: 1/23/24    Date of Surgery: 11/3/2023           Insurance Primary/Secondary: BLUE CROSS MEDICAID / N/A     # Auth Visits: 15            Subjective: Pt is feeling better.     Pain: 2/10       Objective: improved stair negotiation with 6\"    STR:  Flexion: R 4+/5; L 4+*/5  Extension: R 5/5; L 5/5    Hip abduction: R 5/5; L 5-/5  Hip extension: R 5-/5; L 5-/5     ----------    Balance: SLS: R 27 sec, L 3 sec    Functional Mobility:  5x sit<>stand: 19 sec (increased WB on right leg)  TUG (AD, time): 18 sec  SPC    Initial eval:  Observation/Skin: erythema doris incision, several scabs along incision. Increased swelling around the knee.   Palpation: warmth, 1-2+ pitting edema, tenderness along incision, and to nearby muscles  Edema: at knee jt line R: 49.5 cm, L 41.5 cm  Sensation: grossly intact, diminished doris-inscision, pt notes tingling in her toes (likely related to diabetic neuropathy)    AROM: (* denotes performed with pain)   Knee    Flexion: R 121; L 105   Extension: R -2; L -4     PROM: (* denotes performed with pain)   Knee    Flexion: R NT; L 108   Extension: R NT; L -2     Patellar Mobility/Accessory motion: sup/inf mobility limited L    Gait: pt ambulates on level ground with SPC, increased WB on R, increased flexion throughout.       Assessment: Continued LE strengthening, especially knee ext and hip ext strength to improve stair negotiation. Noted some pain in her knees with stair negotiation, including her R knee as well.    She is making great progress with her L knee, will plan to complete remaining visits and DC.     Goals:   Goals: (To be met in 18 visits)   Pt will improve knee extension ROM to 0 deg to allow proper heel strike during  gait and terminal knee extension in stance  Pt will improve knee AROM flexion to >120 degrees to improve ability to perform stair negotiation  Pt will improve quad strength to 5/5 to ascend 1 flight of stairs reciprocally without UE assist  Pt will increase hip and knee strength to grossly 4+/5 to be able to get up and down from the floor safely  Pt will demonstrate increased hip ER/ABD strength to 4+/5 to perform stepping and squatting activities without excessive femoral IR/ADD  Pt will improve SLS to >10s to improve safety and independence with gait on uneven surfaces such as grass  Pt will be independent and compliant with comprehensive HEP to maintain progress achieved in PT    Plan: Patient will be seen for 2 x/week or a total of 18 visits over a 90 day period.  Treatment will include: Gait training, Manual Therapy, Neuromuscular Re-education, Therapeutic Activities, Therapeutic Exercise, and Home Exercise Program instruction  Date: 12/14/23  Tx#: 6/15-18 Date: 12/20/23  Tx#: 7/15 Date: 12/22/23  Tx#: 8/15 Date: 12/26/23  Tx#: 9/15 Date: 12/28/23  Tx#: 10/15 Date: 1/2/24  Tx#: 11/15   TE: 45'   Nustep lvl 5 x6'   Double Leg Press 50# 3x10  4\" step ups x10 (more easier); 6\" step up x10   Stair negotiation review  Lateral step up/down 4\" x20  L3 Parminder stretch x2'  Seated flexion stretching x10  Seated piriformis stretch L x1'  SL hip abduction x20 B  SL clams x20 B  6 in anne stepping at [employer] bar, step to R/L lead x4-5 laps; reciprocal x1 intermittent use of UE support  6in anne step, lateral x4-5 laps TE: 45'   Nustep lvl 5 x8'   Double Leg Press 50# 2x10, 55# x10  SL hip abduction x10 B  SL clams x20 B  Supine piriformis stretching L x2'  Seated forward flexion stretching x10  Strength assessment  Sitting HS curl TyB L x20  Side stepping YTB around thighs x2'   L3 Parminder stretch 5x10\"  6 in hurdles (no UE)  -step to R/L lead x2 laps  -reciprocal x2   -lateral x3 laps  Seated piriformis stretching  bilaterally at end of session  TE: 23'   Nustep lvl 5 x5'   Double Leg Press 55# x20; 75# x20  Single Leg Press 37# x15 B   Sitting HS curl RTB L x20; GTB L x20   L3 [place] stretch 5x10\" TE: 15'   Nustep lvl 5 x5'   Double Leg Press 75# x20  Single Leg Press 42# x15 B  TE: 20'   Nustep lvl 5 x5'   Double Leg Press 75# x20  Single Leg Press 42# x20 L  Step up 6\">> 4\" x15 L  Lat step up/down 4\" x15 L   Heel raise off 1/2 FR x20  TE: 30'   Nustep lvl 5 x5'   Step up 4\" x10 B; 6\" x10 B  Lateral step up and over 4\" x15   Heel raise off 1/2 FR x20   Lunge off 6\" step x15 B   Seated HS stretching 4x10\" B  STR assessment   Prone knee flexion L 3# x10      NR 18'  Side stepping YTB around thighs x3 laps  Monster walks YTB around thighs x3 laps  Cone taps to promote SLS and hip stability  Ambultion with 10# kb in left hand to reduce L lean during L stance  Take away #: improved mechanics    NR 28'  Side stepping YTB around ankles x3 laps  Monster walks YTB around thighs x3 laps  Tiltboard taps ML/AP x2' ea   Attempt balance between   6\" hurdles fwd step to with 2 blue airex pads  Side stepping on blue foam balance beam  +3 hurdles NR 10'  2\" step down L x20  SLS on blue airex + cone taps   Minisquat on blue airex x20   Hip abd and ext x10 B NR 10'  4\" step down L x20  Stair negotiation on 4\" steps x5; 6\" steps x6  Hip abd and ext x20 B     MT 10'  LLD assessment: L leg slightly shorter, pelvis slightly elevated on left  Treated with bilat hip traction long axis with g4++  Bridge MET  After: sacrum assessment, PSIS level and equal depth  Sacral springing normal  Reported some discomfort is coccyx  MT  10'  STM along L knee  Patellar mobs  GIII-IV hip traction ++            HEP:   - Supine Quad Set  - 2 x daily - 7 x weekly - 2 sets - 10 reps - 3 second hold  - Seated Hamstring Stretch  - 3 x daily - 7 x weekly - 3 sets - 10-15 seconds hold  - Seated Heel Slide  - 2 x daily - 7 x weekly - 2 sets - 10 reps  - Sit to Stand  - 3 x  daily - 7 x weekly - 10 reps  - Supine Diaphragmatic Breathing  - 1 x daily - 7 x weekly  - Supine Lower Trunk Rotation  - 2 x daily - 7 x weekly - 20 reps  - Supine Heel Slide with Strap  - 1-2 x daily - 7 x weekly - 2 sets - 10 reps  - Supine Single Leg Ankle Pumps  - 2-3 x daily - 7 x weekly - 30 reps  - Supine Gluteal Sets  - 2-3 x daily - 7 x weekly - 3 sets - 10 reps  - Standing Weight Shift Side to Side  - 2 x daily - 7 x weekly - 2 sets - 10 reps  - Standing Weight Shifting Forward and Backward  - 2 x daily - 7 x weekly - 2 sets - 10 reps  - Seated Lumbar Flexion Stretch  - 3-4 x daily - 7 x weekly - 10 reps  - Sidelying Hip Abduction  - 1 x daily - 5 x weekly - 1-2 sets - 10 reps  - Clamshell  - 1 x daily - 5 x weekly - 1-2 sets - 10 reps    Charges: 2 TE  1 NR   Total Timed Treatment: 40 min  Total Treatment Time: 40 min

## 2024-01-02 ENCOUNTER — OFFICE VISIT (OUTPATIENT)
Dept: PHYSICAL THERAPY | Facility: HOSPITAL | Age: 59
End: 2024-01-02
Attending: ORTHOPAEDIC SURGERY
Payer: MEDICAID

## 2024-01-02 PROCEDURE — 97112 NEUROMUSCULAR REEDUCATION: CPT

## 2024-01-02 PROCEDURE — 97110 THERAPEUTIC EXERCISES: CPT

## 2024-01-03 NOTE — PROGRESS NOTES
Discharge Summary  Pt has attended 12 visits in Physical Therapy.      Diagnosis:   Orthopedic aftercare (Z47.89)  Primary osteoarthritis of left knee (M17.12)  S/P TKR (total knee replacement) using cement, left (Z96.652)      Referring Provider: Caridad  Date of Evaluation:    11/24/2023    Precautions:  Diabetes Next MD visit: 1/23/24    Date of Surgery: 11/3/2023           Insurance Primary/Secondary: BLUE CROSS MEDICAID / N/A     # Auth Visits: 15            Subjective: Pt is feeling better, but is sick.    Pain: 3/10       Objective:     STR:  Knee Flexion: R 5/5; L 4+*/5  Knee Extension: R 5/5; L 5/5    Hip abduction: R 5/5; L 5/5  Hip extension: R 5/5; L 5/5     Initial Balance: SLS: R 27 sec, L 3 sec  1/4/24: SLS: R 10 sec; L 7 sec    Functional Mobility:  Initial: 5x sit<>stand: 19 sec (increased WB on right leg)  1/4/24: 13.62 sec no UE use  Initial: TUG (AD, time): 18 sec  SPC  1/4/24: 18 sec no AD      Edema: at knee jt line L: 49.5 cm, R 41.5 cm  1/4/24: L 40 cm    AROM: (* denotes performed with pain)  Knee 1/4/24  Knee Initial   Flexion: L 120  Extension: L -2  Flexion: R 121; L 105   Extension: R -2; L -4     PROM: (* denotes performed with pain)  Knee 1/4/24  Knee Initial   Flexion: L NT  Extension L -2  Flexion: R NT; L 108   Extension: R NT; L -2     Patellar Mobility/Accessory motion: sup/inf mobility limited L  1/4/24: improved    Gait: pt ambulates on level ground with SPC, increased WB on R, increased flexion throughout.   1/4/24: pt ambulates on level ground mild antalgia, improved mechanics      Assessment: Pt had made good progress with PT- her mobility and strength has improved as outlined above. Her remaining complaint is more consistent with radiculopathy or neuropathy. Advised pt to see her doctor to address her low back pain- but as for her knee she has progressed well. Pt agreed to continue to HEP. Encouraged her to call if she has any remaining questions or concerns.     Goals:    Goals: (To be met in 18 visits)   Pt will improve knee extension ROM to 0 deg to allow proper heel strike during gait and terminal knee extension in stance equal to R knee  Pt will improve knee AROM flexion to >120 degrees to improve ability to perform stair negotiation met  Pt will improve quad strength to 5/5 to ascend 1 flight of stairs reciprocally without UE assist met  Pt will increase hip and knee strength to grossly 4+/5 to be able to get up and down from the floor safely met  Pt will demonstrate increased hip ER/ABD strength to 4+/5 to perform stepping and squatting activities without excessive femoral IR/ADD met  Pt will improve SLS to >10s to improve safety and independence with gait on uneven surfaces such as grass ongoing  Pt will be independent and compliant with comprehensive HEP to maintain progress achieved in PT met    LEFS Score  LEFS Score: 5 % (11/24/2023 11:13 AM)    Post LEFS Score  Post LEFS Score: 55 % (1/4/2024  3:05 PM)    50 % improvement    Plan: DC from PT, continue HEP.    Patient/Family/Caregiver was advised of these findings, precautions, and treatment options and has agreed to actively participate in planning and for this course of care.    Thank you for your referral. If you have any questions, please contact me at Dept: [phone].    Sincerely,  Electronically signed by therapist: Gianluca Santos, PT       Certification From: 1/4/2024  To:4/3/2024    Plan: Patient will be seen for 2 x/week or a total of 18 visits over a 90 day period.  Treatment will include: Gait training, Manual Therapy, Neuromuscular Re-education, Therapeutic Activities, Therapeutic Exercise, and Home Exercise Program instruction  Date: 12/20/23  Tx#: 7/15 Date: 12/22/23  Tx#: 8/15 Date: 12/26/23  Tx#: 9/15 Date: 12/28/23  Tx#: 10/15 Date: 1/2/24  Tx#: 11/15 Date: 1/4/24  Tx#: 12/15   TE: 45'   Nustep lvl 5 x8'   Double Leg Press 50# 2x10, 55# x10  SL hip abduction x10 B  SL clams x20 B  Supine piriformis  stretching L x2'  Seated forward flexion stretching x10  Strength assessment  Sitting HS curl TyB L x20  Side stepping YTB around thighs x2'   L3 Parminder stretch 5x10\"  6 in hurdles (no UE)  -step to R/L lead x2 laps  -reciprocal x2   -lateral x3 laps  Seated piriformis stretching bilaterally at end of session  TE: 23'   Nustep lvl 5 x5'   Double Leg Press 55# x20; 75# x20  Single Leg Press 37# x15 B   Sitting HS curl RTB L x20; GTB L x20   L3 Parminder stretch 5x10\" TE: 15'   Nustep lvl 5 x5'   Double Leg Press 75# x20  Single Leg Press 42# x15 B  TE: 20'   Nustep lvl 5 x5'   Double Leg Press 75# x20  Single Leg Press 42# x20 L  Step up 6\">> 4\" x15 L  Lat step up/down 4\" x15 L   Heel raise off 1/2 FR x20  TE: 30'   Nustep lvl 5 x5'   Step up 4\" x10 B; 6\" x10 B  Lateral step up and over 4\" x15   Heel raise off 1/2 FR x20   Lunge off 6\" step x15 B   Seated HS stretching 4x10\" B  STR assessment   Prone knee flexion L 3# x10  TE: 43'   Nustep lvl 5 x5'   Reassessment: LEFS, functional mobility, ROM, STR  Pelvic assessment: MET and manip utilized to treat   Seated HS stretch 10x5-10\" hold  Seated forward flexion x10   HEP update and to see doctor for back pain    NR 18'  Side stepping YTB around thighs x3 laps  Monster walks YTB around thighs x3 laps  Cone taps to promote SLS and hip stability  Ambultion with 10# kb in left hand to reduce L lean during L stance  Take away #: improved mechanics    NR 28'  Side stepping YTB around ankles x3 laps  Monster walks YTB around thighs x3 laps  Tiltboard taps ML/AP x2' ea   Attempt balance between   6\" hurdles fwd step to with 2 blue airex pads  Side stepping on blue foam balance beam  +3 hurdles NR 10'  2\" step down L x20  SLS on blue airex + cone taps   Minisquat on blue airex x20   Hip abd and ext x10 B NR 10'  4\" step down L x20  Stair negotiation on 4\" steps x5; 6\" steps x6  Hip abd and ext x20 B     MT 10'  LLD assessment: L leg slightly shorter, pelvis slightly elevated on  left  Treated with bilat hip traction long axis with g4++  Bridge MET  After: sacrum assessment, PSIS level and equal depth  Sacral springing normal  Reported some discomfort is coccyx  MT  10'  STM along L knee  Patellar mobs  GIII-IV hip traction ++             HEP:   - Supine Quad Set  - 2 x daily - 7 x weekly - 2 sets - 10 reps - 3 second hold  - Seated Hamstring Stretch  - 3 x daily - 7 x weekly - 3 sets - 10-15 seconds hold  - Seated Heel Slide  - 2 x daily - 7 x weekly - 2 sets - 10 reps  - Sit to Stand  - 3 x daily - 7 x weekly - 10 reps  - Supine Diaphragmatic Breathing  - 1 x daily - 7 x weekly  - Supine Lower Trunk Rotation  - 2 x daily - 7 x weekly - 20 reps  - Supine Heel Slide with Strap  - 1-2 x daily - 7 x weekly - 2 sets - 10 reps  - Supine Single Leg Ankle Pumps  - 2-3 x daily - 7 x weekly - 30 reps  - Supine Gluteal Sets  - 2-3 x daily - 7 x weekly - 3 sets - 10 reps  - Standing Weight Shift Side to Side  - 2 x daily - 7 x weekly - 2 sets - 10 reps  - Standing Weight Shifting Forward and Backward  - 2 x daily - 7 x weekly - 2 sets - 10 reps  - Seated Lumbar Flexion Stretch  - 3-4 x daily - 7 x weekly - 10 reps  - Sidelying Hip Abduction  - 1 x daily - 5 x weekly - 1-2 sets - 10 reps  - Clamshell  - 1 x daily - 5 x weekly - 1-2 sets - 10 reps    - Mini Squat  - 1 x daily - 5 x weekly - 2 sets - 10 reps  - Side Stepping with Resistance at Ankles  - 1 x daily - 5 x weekly - 3 laps   - Forward Monster Walks  - 1 x daily - 5 x weekly - 3 laps  - Backward Monster Walks  - 1 x daily - 5 x weekly - 3 laps  - Step Up  - 1 x daily - 5 x weekly - 2 sets - 10 reps  - Heel Raises with Counter Support  - 1 x daily - 5 x weekly - 2 sets - 10 reps    Charges: 3 TE     Total Timed Treatment: 43 min  Total Treatment Time: 43 min

## 2024-01-04 ENCOUNTER — OFFICE VISIT (OUTPATIENT)
Dept: PHYSICAL THERAPY | Facility: HOSPITAL | Age: 59
End: 2024-01-04
Attending: ORTHOPAEDIC SURGERY
Payer: MEDICAID

## 2024-01-04 PROCEDURE — 97110 THERAPEUTIC EXERCISES: CPT

## 2024-01-09 ENCOUNTER — APPOINTMENT (OUTPATIENT)
Dept: PHYSICAL THERAPY | Facility: HOSPITAL | Age: 59
End: 2024-01-09
Attending: ORTHOPAEDIC SURGERY
Payer: MEDICAID

## 2024-01-12 ENCOUNTER — APPOINTMENT (OUTPATIENT)
Dept: ENDOCRINOLOGY | Facility: HOSPITAL | Age: 59
End: 2024-01-12
Attending: NURSE PRACTITIONER
Payer: MEDICAID

## 2024-01-19 ENCOUNTER — HOSPITAL ENCOUNTER (OUTPATIENT)
Dept: ENDOCRINOLOGY | Facility: HOSPITAL | Age: 59
Discharge: HOME | End: 2024-01-19
Attending: NURSE PRACTITIONER
Payer: MEDICAID

## 2024-01-19 VITALS — BODY MASS INDEX: 34 KG/M2 | WEIGHT: 161.88 LBS

## 2024-01-19 DIAGNOSIS — E11.65 UNCONTROLLED TYPE 2 DIABETES MELLITUS WITH HYPERGLYCEMIA (HCC): Primary | ICD-10-CM

## 2024-01-19 NOTE — PROGRESS NOTES
Matilde Ferrera : 3/30/1965  attended individual initial assessment for Diabetes Education:    Date: 2024         Start time: 11:00 am  End time: 12:30 pm    HEMOGLOBIN A1C (%)   Date Value   11/10/2023 6.6 (A)       Wt Readings from Last 1 Encounters:   11/10/23 173 lb       Assessment: Met with patient,  and son for diabetes education. Patient states that she has had diabetes for about 8 years now. She currently manages her diabetes with healthy eating and metformin.    Diet Hx: eats 3 meals  and 1-2 snacks a day   Meals are usually balanced with animal protein, rice or beans , a vegetable and 2 tortillas    Education provided on the below topics:     Diabetes Overview:  Pathophysiology, A1C results and treatment options for diabetes self-management reviewed.   Described basic process and treatment options for diabetes self-management.  Introduced long term impact of uncontrolled blood glucose on health.    Healthy Eating:  Obtained usual diet history.  Instructed on Basic Diet Guidelines which includes eating 3 meals/day. Eat moderate amounts at consistent times from day to day. Limit/avoid sweets. Instructed on Balanced Plate Method of meal planning. Instructed to limit grains or starchy vegetables to ¼ of plate, protein to ¼ of plate, non-starchy vegetables to ½ plate and modest portions of fruit, yogurt, milk as desired.    Being Active:   Encouraged Physical Activity and briefly reviewed ADA recommended guidelines for activity with type 2 diabetes.    Taking Medications:   Reviewed current diabetes medications (metformin).    Monitoring:  Instructed/reinforced blood glucose monitoring, testing schedules and target goals:   Fasting / Pre-meal  mg/dL 2 Hour Post-prandial <180 mg/dL  Demonstrated ability to perform blood glucose testing.     Problem Solving:   Prevention, detection and treatment of acute complications: taught symptoms of hypoglycemia, hyperglycemia, how to treat low blood  sugar (Rule of 15) and actions for lowering high blood glucose levels.     Behavior Change:  Initiated individualized goal setting process and will continue to refine throughout class series.    Recommendations:      Monitor blood glucose as directed.  Follow Balance Plate method of meal planning.   Attend all  Classes in the series         Written materials provided for all areas covered.  Patient verbalized understanding and all questions answered.    Ella Crespo RN

## 2024-01-22 ENCOUNTER — OFFICE VISIT (OUTPATIENT)
Dept: RHEUMATOLOGY | Facility: CLINIC | Age: 59
End: 2024-01-22

## 2024-01-22 VITALS
BODY MASS INDEX: 33.98 KG/M2 | RESPIRATION RATE: 16 BRPM | SYSTOLIC BLOOD PRESSURE: 119 MMHG | WEIGHT: 161.88 LBS | HEART RATE: 88 BPM | HEIGHT: 58 IN | DIASTOLIC BLOOD PRESSURE: 80 MMHG

## 2024-01-22 DIAGNOSIS — R79.89 ELEVATED LIVER FUNCTION TESTS: ICD-10-CM

## 2024-01-22 DIAGNOSIS — M54.32 LEFT SIDED SCIATICA: ICD-10-CM

## 2024-01-22 DIAGNOSIS — M79.7 FIBROMYALGIA: Primary | ICD-10-CM

## 2024-01-22 DIAGNOSIS — G89.29 CHRONIC LEFT-SIDED LOW BACK PAIN WITH LEFT-SIDED SCIATICA: ICD-10-CM

## 2024-01-22 DIAGNOSIS — M54.42 CHRONIC LEFT-SIDED LOW BACK PAIN WITH LEFT-SIDED SCIATICA: ICD-10-CM

## 2024-01-22 PROCEDURE — 3008F BODY MASS INDEX DOCD: CPT | Performed by: INTERNAL MEDICINE

## 2024-01-22 PROCEDURE — 3074F SYST BP LT 130 MM HG: CPT | Performed by: INTERNAL MEDICINE

## 2024-01-22 PROCEDURE — 3079F DIAST BP 80-89 MM HG: CPT | Performed by: INTERNAL MEDICINE

## 2024-01-22 PROCEDURE — 99214 OFFICE O/P EST MOD 30 MIN: CPT | Performed by: INTERNAL MEDICINE

## 2024-01-22 RX ORDER — GABAPENTIN 300 MG/1
300 CAPSULE ORAL 2 TIMES DAILY
Qty: 180 CAPSULE | Refills: 1 | Status: SHIPPED | OUTPATIENT
Start: 2024-01-22

## 2024-01-22 NOTE — PROGRESS NOTES
Matilde Ferrera is a 58 year old female who presents for   Chief Complaint   Patient presents with    Follow - Up     Polyarthralgia,pt  having a flare up    Pain     Back pain with Left sciatic pain radiating to Left  leg and left groin pain, onset x2 months after surgery    .   HPI:     I had the pleasure of seeing Matilde Ferrera on 8/14/2023 for evaluation.     She is a pleasant 58 year old with hx of DM, obestity, migraines, is here for diffuse. Pain. She  has osteoarthritis of the knee and diffuse body pain. She was referred by Dr. Almanza.   She's had severe body aches x 1 month.   She was before aching in her knees and told it was bone on bone.   She was going to do injections but she was diabetic and over time she was able to bring her hba1c was 6.5 last week.   She is planning to get injections for her knees now. She has appt. Tomorrow. With him.   She tried steroid injecitons in the past and they didn't work a couple of years aog.   She did physical thearpy a couple of years aog.   She is thinking of getting the gel injecitons. Sees [facility] tomorrow.   She did enrol her in senior PT by her town but not able to go b/c of diffuse boday ach e  Her shoudlers, fingers and just to touch her she is crying.     She's also taking care of her  at night - he has a trach and getting evaluated for lung transplante.d     She's getting alto of migraine headaches. They stopped for 6 years and now it's coming back.   She has noticed it 2-3 times it's coming a week.   The headaches are around her temples.   No jaw pain.   No loss of weight.   No fevers.   Started with injections for diabetes - dropped 6 lbs.   She saw ophthalmologist - 3 weeks ago and told it looked ok. 0 was having blurry vision on her right side     No rashes,,. No hx of psoriasis   No malar rash -   No patchy alopecia - diffuse hair loss -     9/8/2023  She finished the steroids yesterday.   She had a right temporal artery bx on 9/6/2023 -  this was not showing GCA  It did help her feels better with the ehadaches.   She feels that her knees starting to get swollen again.   She felt better inher knees with the steroids as well.   Her visiton is better now too.   She took two tyelnol at night b/c of ongoing pain in her knees. Worse in her knees down -   She has about 8-9/10 pain - on her left nkte and down.   The other body pain is gone.     1/22/2024 -   She is off prendisone.   She fel the prednisoen did help her out.   Prefer ibuprofen than prednisone   She is feeling the gabpanetin 300mg at night is helping.   Her pain is a little bit better   Her pain is 4/10 pain.   She feels she gets flare of nerve pain   The pain shoots down her legs.   She gets frustatrted and tearful with that scitaic pain.   She was doing PT for her legs.   She is going to get injections - /and therapy for her back tomorrow -     Wt Readings from Last 2 Encounters:   01/22/24 161 lb 14.4 oz (73.4 kg)   01/19/24 161 lb 14.4 oz (73.4 kg)     Body mass index is 33.84 kg/m².      Current Outpatient Medications   Medication Sig Dispense Refill    HYDROcodone-acetaminophen 7.5-325 MG Oral Tab Take 1 tablet by mouth every 6 (six) hours as needed for Pain. Maximum dose of acetaminophen is 4000 mg from all sources in 24 hours. 25 tablet 0    gabapentin 300 MG Oral Cap Take 1 capsule (300 mg total) by mouth nightly. 30 capsule 3    aspirin 325 MG Oral Tab EC Take 1 tablet (325 mg total) by mouth in the morning and 1 tablet (325 mg total) before bedtime. Do not crush.  After 30 days, resume once daily aspirin 81 mg as you were doing before surgery.. 60 tablet 0    acetaminophen 325 MG Oral Tab Take 2 tablets (650 mg total) by mouth every 8 (eight) hours as needed for Pain. No more than 4000 mg acetaminophen/Tylenol per day from all sources.  Recall that each Norco has 325 mg of Tylenol in it. 60 tablet 0    calcium carbonate-vitamin D 250-3.125 MG-MCG Oral Tab Take 1 tablet by mouth 2  (two) times daily with meals. 60 tablet 0    docusate sodium 100 MG Oral Cap Take 100 mg by mouth 2 (two) times daily. Do not crush.  Stop if loose stool. 20 capsule 0    multivitamin Oral Chew Tab Chew 1 tablet by mouth daily. 60 tablet 0    Dulaglutide (TRULICITY) 3 MG/0.5ML Subcutaneous Solution Pen-injector Inject 3 mg into the skin once a week. 2 mL 2    ONETOUCH ULTRA In Vitro Strip TEST TWICE DAILY 200 strip 1    metFORMIN HCl 1000 MG Oral Tab Take 1 tablet (1,000 mg total) by mouth 2 (two) times daily.      Empagliflozin (JARDIANCE) 25 MG Oral Tab Take 25 mg by mouth every morning before breakfast. 90 tablet 0    ERGOCALCIFEROL 1.25 MG (26023 UT) Oral Cap TAKE 1 CAPSULE BY MOUTH 1 TIME A WEEK (Patient not taking: Reported on 1/22/2024) 8 capsule 0      Past Medical History:   Diagnosis Date    Diabetes (HCC)     Diabetes mellitus (HCC)     Esophageal reflux     Migraines     Osteoarthritis     RIGHT ARM PAIN, left knee      Past Surgical History:   Procedure Laterality Date    CHOLECYSTECTOMY      CYSTOSCOPY,INSERT URETERAL STENT      bladder sling 7 years ago    HYSTERECTOMY      TUBAL LIGATION        History reviewed. No pertinent family history.   Social History:  Social History     Socioeconomic History    Marital status:    Tobacco Use    Smoking status: Never    Smokeless tobacco: Never   Vaping Use    Vaping Use: Never used   Substance and Sexual Activity    Alcohol use: Not Currently    Drug use: Never    Sexual activity: Yes     Social Determinants of Health     Food Insecurity: No Food Insecurity (11/3/2023)    Food Insecurity     Food Insecurity: Never true   Transportation Needs: No Transportation Needs (11/3/2023)    Transportation Needs     Lack of Transportation: No   Housing Stability: Low Risk  (11/3/2023)    Housing Stability     Housing Instability: No           REVIEW OF SYSTEMS:   Review Of Systems:  Fatigue  Constitutional:No fever, no change in weight or appetitie  Derm: No  rashes, no oral ulcers, no alopecia, no photosensitivity, no psoriasis  HEENT: No dry eyes, no dry mouth, no Raynaud's, no nasal ulcers, no parotid swelling, no neck pain, no jaw pain, no temple pain  Eyes: No visual changes,   CVS: No chest pain, no heart disease  RS: No SOB, no Cough, No Pleurtic pain,   GI: No nausea, no vomiiting, no abominal pain, no hx of ulcer, no gastritis, no heartburn, no dyshpagia, no BRBPR or melena  : no dysuria, no hx of miscarriages, no DVT Hx, no hx of OCP,   Neuro: No numbness or tingling, no headache, no hx of seizures,   Psych: no hx of anxiety or depression  ENDO: no hx of thyroid disease, no hx of DM  Joint/Muscluskeltal: see HPI,   All other ROS are negative.     EXAM:   /80   Pulse 88   Resp 16   Ht 4' 10\" (1.473 m)   Wt 161 lb 14.4 oz (73.4 kg)   BMI 33.84 kg/m²   HEENT: Clear oropharynx, no oral ulcers, EOM intact, clear sclear, PERRLA, pleasant, no acute distress, no CAD,   No rashes  CVS: RRR, no murmurs  RS: CTAB, no crackles, no rhonchi  ABD: Soft Non tender, no HSM felt, BS positive  Joint exam:   Hoahaoism tenderness present  no neck tendnerness, good ROM,   Tender in bilateral shoulders   Tender in b/l hips   Tender in b;l thigh   Tender in lower back     EXTREMITIES: no cyanosis, clubbing or edema  NEURO: intact touch, 5/5 ue and le strength    Component      Latest Ref Rng 6/23/2023 8/10/2023   GLUCOSE BLOOD  107    Test Strip Lot #      Numeric  [id number]    Test Strip Expiration Date      Date  1/26/24    HEMOGLOBIN A1C      4.3 - 5.6 %  6.5 !    Cartridge Lot#      Numeric  [id number]    Cartridge Expiration Date      Date  6/30/25    Hepatitis A Virus Ab, Total      Nonreactive   Reactive !     HBSAg Screen      Nonreactive   Nonreactive     HEPATITIS B CORE AB, TOTAL      Nonreactive   Nonreactive     HEPATITIS B SURFACE AB QUAL      Nonreactive   Nonreactive     HEPATITIS B SURFACE AB QUANT      mIU/mL <3.10     HCV AB      Nonreactive   Nonreactive      Iron, Serum      50 - 170 ug/dL 58     Transferrin      200 - 360 mg/dL 231     Iron Bind.Cap.(TIBC)      240 - 450 ug/dL 344     Iron Saturation      15 - 50 % 17     FERRITIN      18.0 - 340.0 ng/mL 21.4     HIV Antigen Antibody Combo      Non-Reactive  Non-Reactive     HEPATITIS A AB, IGM      Nonreactive   Nonreactive        Component      Latest Ref Rng 6/9/2023   Glucose      70 - 99 mg/dL 117 (H)    Sodium      136 - 145 mmol/L 143    Potassium      3.5 - 5.1 mmol/L 3.7    Chloride      98 - 112 mmol/L 108    Carbon Dioxide, Total      21.0 - 32.0 mmol/L 28.0    ANION GAP      0 - 18 mmol/L 7    BUN      7 - 18 mg/dL 7    CREATININE      0.55 - 1.02 mg/dL 0.48 (L)    BUN/CREATININE RATIO      10.0 - 20.0  14.6    CALCIUM      8.5 - 10.1 mg/dL 9.0    CALCULATED OSMOLALITY      275 - 295 mOsm/kg 295    eGFR-Cr      >=60 mL/min/1.73m2 110    ALT (SGPT)      13 - 56 U/L 95 (H)    AST (SGOT)      15 - 37 U/L 45 (H)    ALKALINE PHOSPHATASE      46 - 118 U/L 103    Total Bilirubin      0.1 - 2.0 mg/dL 0.6    PROTEIN, TOTAL      6.4 - 8.2 g/dL 7.7    Albumin      3.4 - 5.0 g/dL 3.8    Globulin      2.8 - 4.4 g/dL 3.9    A/G Ratio      1.0 - 2.0  1.0    Patient Fasting for CMP? Yes    Cholesterol, Total      <200 mg/dL 172    HDL Cholesterol      40 - 59 mg/dL 63 (H)    Triglycerides      30 - 149 mg/dL 122    LDL Cholesterol Calc      <100 mg/dL 88    VLDL      0 - 30 mg/dL 20    NON-HDL CHOLESTEROL      <130 mg/dL 109    Patient Fasting for Lipid? Yes    MALB URINE      mg/dL 1.51    CREATININE UR RANDOM      mg/dL 147.00    MALB/CRE CALC      <=30.0 ug/mg 10.3    TSH      0.358 - 3.740 mIU/mL 2.260    VITAMIN D, 25-OH, TOTAL      30.0 - 100.0 ng/mL 15.8 (L)         Component      Latest Ref Rng 8/14/2023   MYELOPEROX ANTIBODIES, IGG      0.0 - 0.9 units <0.2    SERINE PROTEASE3, IGG      0.0 - 0.9 units <0.2    Cytoplasmic (C-ANCA)      Neg:<1:20 titer <1:20    Perinuclear (P-ANCA)      Neg:<1:20 titer <1:20     ATYPICAL PANCA      Neg:<1:20 titer <1:20    Aldolase      3.3 - 10.3 U/L 10.7 (H)    CK      26 - 192 U/L 80    C-REACTIVE PROTEIN      <0.30 mg/dL <0.29    SED RATE      0 - 30 mm/Hr 10    DUSTIN SCREEN WITH REFLEX (S)      Negative  Negative    C-Citrullinated Peptide IgG AB      0.0 - 6.9 U/mL 0.6    RHEUMATOID FACTOR      <15 IU/mL <10       Legend:  (H) High  5/15/2023 - b/l knee xray    1. Demineralization.   2. Moderate to severe osteoarthritis bilaterally.   3. Small joint effusions.   4. 1.78 cm calcification in the distal soft tissues of the thigh on the right.       ASSESSMENT AND PLAN:   Matilde Ferrera is a 58 year old female who presents for   Chief Complaint   Patient presents with    Follow - Up     Polyarthralgia,pt  having a flare up    Pain     Back pain with Left sciatic pain radiating to Left  leg and left groin pain, onset x2 months after surgery      Diffuse polayrthraliga and myaliga - new onset headaches , back pain   - labs negative for  inflammatory arthritis and/or connective tissue disease   - not likely  PMR/temporal arteriris and temporal artery bx on 9/6/2023 - was normal -   - currently her vision is good -  optho 3 weeks ago , blurry vision 4 times - nono now   - finished prednisone 60mg -  60mg x 1 week, then 40mg x 1 week, then 20mg a day x 2 weeks , - started on 8/14/2023 -   - and now weaned off Prednsione 5mg x 3 days, and now off   - cont. gabapentin 300mg at night - ok to increase to 600mg at night - or 300mg twice a day -   - rtc in 3- 4 months    Primary osteoarthritis of knees -   Getting left knee sx on 11/2023 -     3. DM - now 6.5 - , and was on 9.5 -   Started on truliciy, metformin and glipizide -   If sugars are too high - needs better contre.     4. Left sided sciatica lower back pain -   Send for physical therpay for lower back   Refer to physiatry for potential injections   Increased gabpanetin to 300mg bid  F/u ortho     5. Vit d def -     6. Elevated liver tests  in 6/2023 - reepeat in 3/2024 - hold on nsaids at this time.     Summary:   Refer to physiatry for lower back - for possible injections for sciatica   Check labs in march -    Cont.  gabapentin  increase to 300mg twice a day -   Return to clinic in 4-6 months.   Physical therpay for lower back         Robina Emerson MD  1/22/2024   3:26 PM

## 2024-01-22 NOTE — PATIENT INSTRUCTIONS
Refer to physiatry for lower back - for possible injections for sciatica - call [phone]  Check labs in march -    Cont.  gabapentin  increase to 300mg twice a day -   Return to clinic in 4months -6 months  Physical therapy for lower back

## 2024-01-23 ENCOUNTER — OFFICE VISIT (OUTPATIENT)
Dept: ORTHOPEDICS CLINIC | Facility: CLINIC | Age: 59
End: 2024-01-23

## 2024-01-23 ENCOUNTER — HOSPITAL ENCOUNTER (OUTPATIENT)
Dept: GENERAL RADIOLOGY | Facility: HOSPITAL | Age: 59
Discharge: HOME | End: 2024-01-23
Attending: ORTHOPAEDIC SURGERY
Payer: MEDICAID

## 2024-01-23 DIAGNOSIS — M54.16 LEFT LUMBAR RADICULOPATHY: ICD-10-CM

## 2024-01-23 DIAGNOSIS — M17.12 PRIMARY OSTEOARTHRITIS OF LEFT KNEE: Primary | ICD-10-CM

## 2024-01-23 DIAGNOSIS — E66.09 CLASS 1 OBESITY DUE TO EXCESS CALORIES WITH SERIOUS COMORBIDITY AND BODY MASS INDEX (BMI) OF 33.0 TO 33.9 IN ADULT: ICD-10-CM

## 2024-01-23 DIAGNOSIS — Z47.89 ORTHOPEDIC AFTERCARE: ICD-10-CM

## 2024-01-23 DIAGNOSIS — Z96.652 S/P TKR (TOTAL KNEE REPLACEMENT) USING CEMENT, LEFT: ICD-10-CM

## 2024-01-23 PROCEDURE — 73562 X-RAY EXAM OF KNEE 3: CPT | Performed by: ORTHOPAEDIC SURGERY

## 2024-01-23 PROCEDURE — 99024 POSTOP FOLLOW-UP VISIT: CPT | Performed by: ORTHOPAEDIC SURGERY

## 2024-01-23 NOTE — PROGRESS NOTES
NURSING INTAKE COMMENTS:   Chief Complaint   Patient presents with    Post-Op     2ND POV L TKA 11/03/2023 - Pt states pain on sides of knee- pain radiated down to foot and numbness in toes - Pain in leg - standing up for a while is painful -  had appointment with rheumatology and was sent to pt        HPI: This 58 year old female presents today with her daughter who translated.  She is 3.5 months after left knee replacement and today she said she really likes her left knee replacement.  She did a great job in therapy and has great motion.  She is ambulating dependently.  She was discharged from therapy on 1/4/2024 and I told her to continue the exercises on her own until November 2024.  She agreed to do so.    She is also doing well with weight loss.  When I first met her she was at a BMI of 36 and now she is down to 33.  She continues with calorie counting and portion control.  I told her is very proud of her and she says she feels better.      She has a new complaint of left L5 radiculopathy with a little numbness and tingling.  She has no back pain, only left L5 radiculopathy from the buttocks down to the dorsal lateral left foot.  She has normal motor function and denies bowel or bladder problems, balance disorder, or right leg complaint.    Past Medical History:   Diagnosis Date    Diabetes (HCC)     Diabetes mellitus (HCC)     Esophageal reflux     Migraines     Osteoarthritis     RIGHT ARM PAIN, left knee     Past Surgical History:   Procedure Laterality Date    CHOLECYSTECTOMY      CYSTOSCOPY,INSERT URETERAL STENT      bladder sling 7 years ago    HYSTERECTOMY      TUBAL LIGATION       Current Outpatient Medications   Medication Sig Dispense Refill    gabapentin 300 MG Oral Cap Take 1 capsule (300 mg total) by mouth in the morning and 1 capsule (300 mg total) before bedtime. 180 capsule 1    HYDROcodone-acetaminophen 7.5-325 MG Oral Tab Take 1 tablet by mouth every 6 (six) hours as needed for Pain. Maximum  dose of acetaminophen is 4000 mg from all sources in 24 hours. 25 tablet 0    aspirin 325 MG Oral Tab EC Take 1 tablet (325 mg total) by mouth in the morning and 1 tablet (325 mg total) before bedtime. Do not crush.  After 30 days, resume once daily aspirin 81 mg as you were doing before surgery.. 60 tablet 0    acetaminophen 325 MG Oral Tab Take 2 tablets (650 mg total) by mouth every 8 (eight) hours as needed for Pain. No more than 4000 mg acetaminophen/Tylenol per day from all sources.  Recall that each Norco has 325 mg of Tylenol in it. 60 tablet 0    calcium carbonate-vitamin D 250-3.125 MG-MCG Oral Tab Take 1 tablet by mouth 2 (two) times daily with meals. 60 tablet 0    docusate sodium 100 MG Oral Cap Take 100 mg by mouth 2 (two) times daily. Do not crush.  Stop if loose stool. 20 capsule 0    multivitamin Oral Chew Tab Chew 1 tablet by mouth daily. 60 tablet 0    Dulaglutide (TRULICITY) 3 MG/0.5ML Subcutaneous Solution Pen-injector Inject 3 mg into the skin once a week. 2 mL 2    ONETOUCH ULTRA In Vitro Strip TEST TWICE DAILY 200 strip 1    ERGOCALCIFEROL 1.25 MG (31366 UT) Oral Cap TAKE 1 CAPSULE BY MOUTH 1 TIME A WEEK (Patient not taking: Reported on 1/22/2024) 8 capsule 0    metFORMIN HCl 1000 MG Oral Tab Take 1 tablet (1,000 mg total) by mouth 2 (two) times daily.      Empagliflozin (JARDIANCE) 25 MG Oral Tab Take 25 mg by mouth every morning before breakfast. 90 tablet 0     No Known Allergies  History reviewed. No pertinent family history.  No family Hx of DVT/PE    Social History     Occupational History    Not on file   Tobacco Use    Smoking status: Never    Smokeless tobacco: Never   Vaping Use    Vaping Use: Never used   Substance and Sexual Activity    Alcohol use: Not Currently    Drug use: Never    Sexual activity: Yes        Review of Systems:  GENERAL: feels generally well, no significant weight loss or weight gain  SKIN: no ulcerated or worrisome skin lesions  EYES:denies blurred vision or  double vision  HEENT: denies new nasal congestion, sinus pain or ST  LUNGS: denies shortness of breath  CARDIOVASCULAR: denies chest pain  GI: no hematemesis, no worsening heartburn, no diarrhea  : no dysuria, no blood in urine, no difficulty urinating, no incontinence  MUSCULOSKELETAL: no other musculoskeletal complaints other than in HPI  NEURO: no numbness or tingling, no weakness or balance disorder  PSYCHE: no depression or anxiety  HEMATOLOGIC: no hx of blood dyscrasia, no Hx DVT/PE  ENDOCRINE: no thyroid or diabetes issues  ALL/ASTHMA: no new hx of severe allergy or asthma    Physical Examination:    There were no vitals taken for this visit.  Constitutional: appears well hydrated, alert and responsive, no acute distress noted  Extremities: Exam of the lumbar spine found no masses or swelling in the lumbar or sacral areas.  No tenderness in the low back.  She can forward flex and touch her toes and extend 20 degrees.  She can toe walk, heel walk, squat more than FDC and come up.  Straight leg raise standing and seated was negative.  No atrophy of the lower extremity.  Musculoskeletal: Left knee incision healed with little keloid but otherwise cosmetic.  No swelling of the leg or knee.  No effusions.  Good motion 0 to 125 degrees without instability or patellar tracking issues.  No crepitus or tenderness.  Neurological: Normal motor and sensory distally at the present time but she will get tingling in the left L5 dermatomes at times.    Imaging: X-rays of the left knee replacement show well-fixed and in proper alignment.      No results found.     Lab Results   Component Value Date    WBC 10.3 06/23/2023    HGB 11.9 (L) 11/04/2023    .0 06/23/2023      Lab Results   Component Value Date     (H) 11/04/2023    BUN <5 (L) 11/04/2023    CREATSERUM 0.51 (L) 11/04/2023        Assessment and Plan:  Diagnoses and all orders for this visit:    Primary osteoarthritis of left knee    Orthopedic  aftercare  -     XR KNEE (3 VIEWS), LEFT (CPT=73562); Future    S/P TKR (total knee replacement) using cement, left    Class 1 obesity due to excess calories with serious comorbidity and body mass index (BMI) of 33.0 to 33.9 in adult    Left lumbar radiculopathy  -     Pain Management Referral - [facility]  -     PHYSICAL THERAPY - INTERNAL        Assessment: Left lumbar radiculopathy is a new problem.  The left knee replacement is 3.5 months out and doing very well.    Plan: For the lumbar spine, I recommended continued weight loss, physical therapy, and to see the pain management service and she is neurologically intact.    For the left knee, she will continue the home exercise program and weight loss.  I will see her in November 2024 for 1 year anniversary x-rays.  She was instructed to come back sooner if any problems develop.    Follow Up: No follow-ups on file.    Dustin Mclean MD

## 2024-02-15 ENCOUNTER — OFFICE VISIT (OUTPATIENT)
Dept: PHYSICAL THERAPY | Facility: HOSPITAL | Age: 59
End: 2024-02-15
Attending: ORTHOPAEDIC SURGERY
Payer: MEDICAID

## 2024-02-15 DIAGNOSIS — M54.16 LEFT LUMBAR RADICULOPATHY: Primary | ICD-10-CM

## 2024-02-15 PROCEDURE — 97162 PT EVAL MOD COMPLEX 30 MIN: CPT

## 2024-02-15 PROCEDURE — 97110 THERAPEUTIC EXERCISES: CPT

## 2024-02-15 NOTE — PROGRESS NOTES
SPINE EVALUATION:     Diagnosis:   Left lumbar radiculopathy (M54.16)      Referring Provider: Caridad  Date of Evaluation:    2/15/2024    Precautions:   recent L TKA , diabetes, OA Next MD visit:   none scheduled  Date of Surgery: n/a   : [id number]  PATIENT SUMMARY   Matilde Ferrera is a 58 year old female who presents to therapy today with complaints of low back pain with L side radicular symptoms.     History of Current Condition: Patient reporting L hip, abdominal, and lumbar pain that began without LUIZA a few months ago. Had L TKA done 11/3/2023 and states the pain seems to have increased since that time. Has since completed PT in January 2024 and states the knee still hurts but isn't too bad. Does get some numbness along joint line but not new since surgery. At most recent f/u with orthopedic team patient voices concern regarding back pain.   N/T: Throughout L foot from low back to the foot.     Imaging: None at this time.     Pt describes pain level current 5/10, at best 3/10 in the morning, at worst 10/10 after long day.   Quality/Location: Aching , burning in hip. N/T at L foot  Relieving: Not taking medication, not using ice/heat  Aggravating: stair negotiation, carrying items    Current functional limitations include carrying 's oxygen tank, lifting objects, stair negotiation, walking, grocery shopping, prolonged sitting and standing, and walking long distances .   Home Set Up: Lives with daughter, has 6-7 stairs into the house   Occupation: Pt full time caregiver for bed-ridden     Matilde describes prior level of function has been limited by B knee pain prior to start of low back pain. Pt goals include decreasing pain without having to do injections.    Past medical history was reviewed with Matilde. Significant findings include   Past Medical History:   Diagnosis Date    Diabetes (HCC)     Diabetes mellitus (HCC)     Esophageal reflux     Migraines     Osteoarthritis     RIGHT ARM  PAIN, left knee       Pt denies diplopia, dysarthria, dysphasia, dizziness, drop attacks, bowel/bladder changes, saddle anesthesia, and [place] LE N/T.    ASSESSMENT  Matilde presents to physical therapy evaluation with primary c/o Low back pain with LLE radiculopathy. The results of the objective tests and measures show decreased lumbar mobility, strength deficits, lumbar spine hypomobility, and gait abnormalities. Prone Press up response in session suggesting lumbar extension bias, sx improvement at end of session at low back.   Functional deficits include but are not limited to carrying 's oxygen tank, lifting objects, stair negotiation, walking, grocery shopping, prolonged sitting and standing, and walking long distances.  Signs and symptoms are consistent with diagnosis of referring diagnosis. Pt and PT discussed evaluation findings, pathology, POC and HEP.  Pt voiced understanding and performs HEP correctly without reported pain. Skilled Physical Therapy is medically necessary to address the above impairments and reach functional goals.     OBJECTIVE:   Observation/Posture: Fwd flexed, slow cautious gait into session. Pt sits with LLE in long sit for subjective   Posture: to be formally assessed in future session    Neuro Screen:   L4 DTR: 1+ on R, 2+ on L  S2 DTR: B 2+  Ankle clonus: absent B    Lumbar AROM: (* denotes performed with pain)  Flexion: WNL, decreased fluidity of movement/ slow return with inc pain  Extension: WNL  Sidebending: R 50%; L 50%  Rotation: R 75%; L 75%    Accessory motion: Lumbar PA glides: min-mod hypomobility-- no sx provocation  Palpation: not TTT    Strength: (* denotes performed with pain)  LE   Hip flexion (L2): R 4/5; L 3+/5  Hip abduction: R 4+/5; L 3-/5  Hip Extension: R 4-/5; L 3-/5   Hip ER: R 5/5; L 5*/5  Hip IR: R 3+/5; L 4-*/5  Knee Flexion: R 5/5; L 4*/5   Knee extension (L3): R 5/5; L 4*/5   DF (L4): R NT/5; L NT/5  Great Toe Ext (L5): R NT/5, L NT/5  PF (S1): R NT/5;  L NT/5     Flexibility:   LE   Hip Flexor: R NT, L NT  Hamstrings: R NT; L NT  Piriformis: R min-mod limitation; L Max limitation  Quads: R NT; L NT  Gastroc-soleus: R NT; L NT     Special tests:   Repeated Motions Testinx fwd flex: no sxs  5x lumbar ext: no sxs  Slump: B - (difficulty with testing position, possibly beneficial to re-test in future sessions)   Straight Leg Raise: R NT; L NT    Gait: pt ambulates on level ground with decreased step length R and decreased stance phase L .  Balance: SLS R NT, L NT    Today’s Treatment and Response:   Pt education was provided on exam findings, treatment diagnosis, treatment plan, expectations, and prognosis. Pt was also provided recommendations for activity modifications, possible soreness after evaluation, modalities as needed [ice/heat], postural corrections, and importance of remaining active    Patient was instructed in and issued a HEP for: prone press up, seated piriformis stretch    Charges: PT Eval Moderate Complexity, 1 TE      Total Timed Treatment: 10  min     Total Treatment Time: 50 min     Based on clinical rationale and outcome measures, this evaluation involved Moderate Complexity decision making due to 1-2 personal factors/comorbidities, 3 body structures involved/activity limitations, and evolving symptoms including changing pain levels.  PLAN OF CARE:    Goals: (to be met in 8-10 visits)   Pt will improve transversus abdominis recruitment to perform proper isometric contraction without requiring verbal or tactile cuing to promote advancement of therex   Pt will demonstrate good understanding of proper posture and body mechanics to decrease pain and improve spinal safety  Pt will report improved symptom centralization and absence of radicular symptoms for 3 consecutive days to improve function with ADL   Pt will have decreased paraspinal mm tension to tolerate grocery shopping without relying on cart.   Pt will demonstrate improved core strength  to be able to lift oxygen tank from ground to waist with good mechanics and <1/10 pain   Pt will be independent and compliant with comprehensive HEP to maintain progress achieved in PT     Frequency / Duration: Patient will be seen for 1-2 x/week or a total of 8-10 visits over a 90 day period. Treatment will include: Gait training, Manual Therapy, Neuromuscular Re-education, Self-Care Home Management, Therapeutic Activities, Therapeutic Exercise, and Home Exercise Program instruction    Education or treatment limitation: Communication  Rehab Potential:good    Oswestry Disability Index Score  Score: 38 % (2/15/2024  2:29 PM)      Patient/Family/Caregiver was advised of these findings, precautions, and treatment options and has agreed to actively participate in planning and for this course of care.    Thank you for your referral. Please co-sign or sign and return this letter via fax as soon as possible to [phone]. If you have any questions, please contact me at Dept: [phone]    Sincerely,  Electronically signed by therapist: Gissell Whitfield, PT    Physician's certification required: Yes  I certify the need for these services furnished under this plan of treatment and while under my care.    X___________________________________________________ Date____________________    Certification From: 2/15/2024  To:5/15/2024

## 2024-02-20 ENCOUNTER — OFFICE VISIT (OUTPATIENT)
Dept: PHYSICAL THERAPY | Facility: HOSPITAL | Age: 59
End: 2024-02-20
Attending: ORTHOPAEDIC SURGERY
Payer: MEDICAID

## 2024-02-20 PROCEDURE — 97112 NEUROMUSCULAR REEDUCATION: CPT

## 2024-02-20 PROCEDURE — 97110 THERAPEUTIC EXERCISES: CPT

## 2024-02-20 PROCEDURE — 97140 MANUAL THERAPY 1/> REGIONS: CPT

## 2024-02-20 NOTE — PROGRESS NOTES
Diagnosis:   Left lumbar radiculopathy (M54.16)       Referring Provider: Caridad  Date of Evaluation:    2/15/2024    Precautions:   recent L TKA , diabetes, OA Next MD visit:   none scheduled  Date of Surgery: n/a       Insurance Primary/Secondary: BLUE CROSS MEDICAID / N/A     # Auth Visits: 8            Subjective: Patient reports no pain in low back pain. Patient reports preforming HEP twice a day and numbness is going away.     Pain:   Beginnin/10  End: 0/10      Objective:   2024  Slump Test: R: - and L +   SLR: R - and L +      At IE:  Lumbar AROM: (* denotes performed with pain)  Flexion: WNL, decreased fluidity of movement/ slow return with inc pain  Extension: WNL  Sidebending: R 50%; L 50%  Rotation: R 75%; L 75%    Assessment: Assessed seated slump test and SLR today, patient demonstrates a positive seated slump test and SLR on L LE. She reports sciatic nerve glides feel good. Added exercises to therapy and HEP to work on nerve mobility and piriformis flexibility. With sit to supine, patient reports pain in R knee. Provided manual therapy, which patient reports feels \"perfect\" reporting no pain.      number: [id number]      Goals: (to be met in 8-10 visits)   Pt will improve transversus abdominis recruitment to perform proper isometric contraction without requiring verbal or tactile cuing to promote advancement of therex   Pt will demonstrate good understanding of proper posture and body mechanics to decrease pain and improve spinal safety  Pt will report improved symptom centralization and absence of radicular symptoms for 3 consecutive days to improve function with ADL   Pt will have decreased paraspinal mm tension to tolerate grocery shopping without relying on cart.   Pt will demonstrate improved core strength to be able to lift oxygen tank from ground to waist with good mechanics and <1/10 pain   Pt will be independent and compliant with comprehensive HEP to maintain progress  achieved in PT     Plan: Treatment will include but is not limited to a progression of stretching, strengthening, functional movement training, and manual therapies.  Plan on adding sliders/tensioners, TA stabilization exercises in future sessions.    Date: 2/20/2024  TX#: 2/8-10 Date:                 TX#: 3/ Date:                 TX#: 4/ Date:                 TX#: 5/ Date:   Tx#: 6/   TE: 25 min  Supine piriformis stretch knee across chest 2 x 15 sec  BKTC orange SB x 20   SKTC 5 sec x 15   Clamshell x 20   Figure 4 stretch 3 x 30 sec   LTR x 20        MT: 10 min  -jt mob distraction L4-S1 in L S/L  -Lumbar jt mob \"break the bread\" STM/joint technique in L S/L   -Gapping mob gr 3: L4-5, L5-S1 in S/L  STM - L piriformis, QL, PS and glutes w/ piriformis release       NE: 10 min  Posterior pelvic tilts 3 sec x 10  Sciatic nerve glides seated x 15  Sciatic nerve glides supine w/ towel assist x 15              HEP:   Access Code: [email or web address]  URL: [email or web address]  Date: 02/20/2024  Prepared by: Trey Torres  Exercises/descriptions provided in [place]    Exercises  - Supine Lower Trunk Rotation  - 1 x daily - 7 x weekly - 2 sets - 10 reps  - Seated Slump Nerve Glide  - 1 x daily - 7 x weekly - 2 sets - 10 reps  - Supine Figure 4 Piriformis Stretch  - 1 x daily - 7 x weekly - 3 sets - 1 reps - 30 sec hold  - Supine Posterior Pelvic Tilt  - 1 x daily - 7 x weekly - 2 sets - 10 reps - 3 sec hold    Charges: 2 TE; 1 MT; 1 NE       Total Timed Treatment: 45 min  Total Treatment Time: 45 min

## 2024-02-22 ENCOUNTER — OFFICE VISIT (OUTPATIENT)
Dept: PHYSICAL THERAPY | Facility: HOSPITAL | Age: 59
End: 2024-02-22
Attending: ORTHOPAEDIC SURGERY
Payer: MEDICAID

## 2024-02-22 PROCEDURE — 97110 THERAPEUTIC EXERCISES: CPT

## 2024-02-22 PROCEDURE — 97140 MANUAL THERAPY 1/> REGIONS: CPT

## 2024-02-22 NOTE — PROGRESS NOTES
Diagnosis:   Left lumbar radiculopathy (M54.16)       Referring Provider: Caridad  Date of Evaluation:    2/15/2024    Precautions:   recent L TKA , diabetes, OA Next MD visit:   none scheduled  Date of Surgery: n/a       Insurance Primary/Secondary: BLUE CROSS MEDICAID / N/A     # Auth Visits: 8            Subjective: Pt reports feeling good with no back or hip pain, some slight R UE pain, does not know why. Has been doing HEP 2x/day and using a peddler at home that feels good.    Pain:   Beginnin/10  End: 0/10      Objective:   2024  Slump Test: R: - and L +   SLR: R - and L +      At IE:  Lumbar AROM: (* denotes performed with pain)  Flexion: WNL, decreased fluidity of movement/ slow return with inc pain  Extension: WNL  Sidebending: R 50%; L 50%  Rotation: R 75%; L 75%    Assessment: Pt requires frequent cueing to maintain PPT when performing marches in H/L, demonstrates good carryover at terminal reps. Cues needed to prevent pelvic rotation w/ LTRs, pt demonstrated understanding. R sided back pain elicited w/ SLS on R when performing L hip ext/abd slides w/o UE support, pain subsided when holding on to bar ar mirror.       number: [id number]      Goals: (to be met in 8-10 visits)   Pt will improve transversus abdominis recruitment to perform proper isometric contraction without requiring verbal or tactile cuing to promote advancement of therex   Pt will demonstrate good understanding of proper posture and body mechanics to decrease pain and improve spinal safety  Pt will report improved symptom centralization and absence of radicular symptoms for 3 consecutive days to improve function with ADL   Pt will have decreased paraspinal mm tension to tolerate grocery shopping without relying on cart.   Pt will demonstrate improved core strength to be able to lift oxygen tank from ground to waist with good mechanics and <1/10 pain   Pt will be independent and compliant with comprehensive HEP to maintain  progress achieved in PT     Plan: Treatment will include but is not limited to a progression of stretching, strengthening, functional movement training, and manual therapies.  Tva stabilization, NuStep at beginning of sessions  Date: 2/20/2024  TX#: 2/8-10 Date: 2/22/24                TX#: 3/8-10 Date:                 TX#: 4/ Date:                 TX#: 5/ Date:   Tx#: 6/   TE: 25 min  Supine piriformis stretch knee across chest 2 x 15 sec  BKTC orange SB x 20   SKTC 5 sec x 15   Clamshell x 20   Figure 4 stretch 3 x 30 sec   LTR x 20  TE: 28 min  DKTC w/ SB x15  SB push for tva in H/L x20  Hip abd/add MRE x10 ea  LTR x20  Side-lying L hip abd x10   Side-lying L clams x15   Sit to stands from lowest table height x15  NuStep L6 UE/LE 5'                MT: 10 min  -jt mob distraction L4-S1 in L S/L  -Lumbar jt mob \"break the bread\" STM/joint technique in L S/L   -Gapping mob gr 3: L4-5, L5-S1 in S/L  STM - L piriformis, QL, PS and glutes w/ piriformis release MT: 10 min  L4/5 rot mobs Gr lll  L4/5 central PA glides Gr lll/lV        NE: 10 min  Posterior pelvic tilts 3 sec x 10  Sciatic nerve glides seated x 15  Sciatic nerve glides supine w/ towel assist x 15 NE: 7 min  PPT w/ march in H/L x15  L Sciatic nerve glides x20 ankle pumps               HEP:   Access Code: [email or web address]  URL: [email or web address]  Date: 02/20/2024  Prepared by: Trey Torres  Exercises/descriptions provided in Finnish    Exercises  - Supine Lower Trunk Rotation  - 1 x daily - 7 x weekly - 2 sets - 10 reps  - Seated Slump Nerve Glide  - 1 x daily - 7 x weekly - 2 sets - 10 reps  - Supine Figure 4 Piriformis Stretch  - 1 x daily - 7 x weekly - 3 sets - 1 reps - 30 sec hold  - Supine Posterior Pelvic Tilt  - 1 x daily - 7 x weekly - 2 sets - 10 reps - 3 sec hold    Charges: 2 TE; 1 MT       Total Timed Treatment: 45 min  Total Treatment Time: 45 min

## 2024-02-27 ENCOUNTER — OFFICE VISIT (OUTPATIENT)
Dept: PHYSICAL THERAPY | Facility: HOSPITAL | Age: 59
End: 2024-02-27
Attending: ORTHOPAEDIC SURGERY
Payer: MEDICAID

## 2024-02-27 PROCEDURE — 97110 THERAPEUTIC EXERCISES: CPT

## 2024-02-27 PROCEDURE — 97140 MANUAL THERAPY 1/> REGIONS: CPT

## 2024-02-27 NOTE — PROGRESS NOTES
Diagnosis:   Left lumbar radiculopathy (M54.16)       Referring Provider: Caridad  Date of Evaluation:    2/15/2024    Precautions:   recent L TKA , diabetes, OA Next MD visit:   none scheduled  Date of Surgery: n/a       Insurance Primary/Secondary: BLUE CROSS MEDICAID / N/A     # Auth Visits: 8            Subjective: Pt reports increased pain yesterday in her knee and shoulder, she reports her knee was swollen as well yesterday. She reports pain has subsided in back and legs, but continued shoulder pain.     Pain:   Beginning:   3/10 low back pain   2/10 thigh pain  3-4/10 L knee    End:   0/10 low back  0/10 thigh   3-4/10 L knee pain            Objective:   2/20/2024  Slump Test: R: - and L +   SLR: R - and L +      At IE:  Lumbar AROM: (* denotes performed with pain)  Flexion: WNL, decreased fluidity of movement/ slow return with inc pain  Extension: WNL  Sidebending: R 50%; L 50%  Rotation: R 75%; L 75%    Assessment: Due to shoulder pain performed nu-step with LE only. Patient reports relief with manual therapy and standing lumbar extension over table. Added Lumbar extension over table to HEP.        number: [id number]      Goals: (to be met in 8-10 visits)   Pt will improve transversus abdominis recruitment to perform proper isometric contraction without requiring verbal or tactile cuing to promote advancement of therex   Pt will demonstrate good understanding of proper posture and body mechanics to decrease pain and improve spinal safety  Pt will report improved symptom centralization and absence of radicular symptoms for 3 consecutive days to improve function with ADL   Pt will have decreased paraspinal mm tension to tolerate grocery shopping without relying on cart.   Pt will demonstrate improved core strength to be able to lift oxygen tank from ground to waist with good mechanics and <1/10 pain   Pt will be independent and compliant with comprehensive HEP to maintain progress achieved in PT      Plan: Treatment will include but is not limited to a progression of stretching, strengthening, functional movement training, and manual therapies.  Plan on adding pallof press, TA step outs in future sessions.    Date: 2/20/2024  TX#: 2/8-10 Date: 2/22/24                TX#: 3/8-10 Date:2/27/2024                TX#: 4/8-12 Date:                 TX#: 5/ Date:   Tx#: 6/   TE: 25 min  Supine piriformis stretch knee across chest 2 x 15 sec  BKTC orange SB x 20   SKTC 5 sec x 15   Clamshell x 20   Figure 4 stretch 3 x 30 sec   LTR x 20  TE: 28 min  DKTC w/ SB x15  SB push for tva in H/L x20  Hip abd/add MRE x10 ea  LTR x20  Side-lying L hip abd x10   Side-lying L clams x15   Sit to stands from lowest table height x15  NuStep L6 UE/LE 5' TE: 25 min  Nu-step lvl LE only - 5 min  SKTC 5 sec x 10   Clamshell x 20 L  Figure 4 in supine 15 sec x 5 R/L  BKTC SB x 15  H/L clamshell YTB x 15  Lumbar extension over table x 15 (responds very well, finding pain relief)               MT: 10 min  -jt mob distraction L4-S1 in L S/L  -Lumbar jt mob \"break the bread\" STM/joint technique in L S/L   -Gapping mob gr 3: L4-5, L5-S1 in S/L  STM - L piriformis, QL, PS and glutes w/ piriformis release MT: 10 min  L4/5 rot mobs Gr lll  L4/5 central PA glides Gr lll/lV   MT: 15 min  -jt mob distraction L4-S1 in L S/L  -Lumbar jt mob \"break the bread\" STM/joint technique in L S/L (responds very well, finding pain relief)  -Gapping mob gr 3: L4-5, L5-S1 in S/L (responds very well, finding pain relief)  STM - L piriformis, QL, PS and glutes w/ piriformis release     NE: 10 min  Posterior pelvic tilts 3 sec x 10  Sciatic nerve glides seated x 15  Sciatic nerve glides supine w/ towel assist x 15 NE: 7 min  PPT w/ march in H/L x15  L Sciatic nerve glides x20 ankle pumps                 HEP:   Access Code: [email or web address]  URL: [email or web address]  Date: 02/20/2024  Prepared by: Trey Torres  Exercises/descriptions provided in  [place]    Exercises  - Supine Lower Trunk Rotation  - 1 x daily - 7 x weekly - 2 sets - 10 reps  - Seated Slump Nerve Glide  - 1 x daily - 7 x weekly - 2 sets - 10 reps  - Supine Figure 4 Piriformis Stretch  - 1 x daily - 7 x weekly - 3 sets - 1 reps - 30 sec hold  - Supine Posterior Pelvic Tilt  - 1 x daily - 7 x weekly - 2 sets - 10 reps - 3 sec hold    Charges: 2 TE; 1 MT       Total Timed Treatment: 40 min  Total Treatment Time: 40 min   Fair

## 2024-02-29 ENCOUNTER — OFFICE VISIT (OUTPATIENT)
Dept: PHYSICAL THERAPY | Facility: HOSPITAL | Age: 59
End: 2024-02-29
Attending: ORTHOPAEDIC SURGERY
Payer: MEDICAID

## 2024-02-29 PROCEDURE — 97110 THERAPEUTIC EXERCISES: CPT

## 2024-02-29 PROCEDURE — 97112 NEUROMUSCULAR REEDUCATION: CPT

## 2024-02-29 NOTE — PROGRESS NOTES
Diagnosis:   Left lumbar radiculopathy (M54.16)       Referring Provider: Caridad  Date of Evaluation:    2/15/2024    Precautions:   recent L TKA , diabetes, OA Next MD visit:   none scheduled  Date of Surgery: n/a       Insurance Primary/Secondary: BLUE CROSS MEDICAID / N/A     # Auth Visits: 8            Subjective: Pt reports less back pain today, her L knee and shoulder are still causing her pain. Pt states that she feels better right after therapy, but pain usually comes back within 45 minutes after PT sessions.  Pain:   Beginning:   3/10 low back pain   2/10 thigh pain  3/10 L knee    End:   0/10 low back  0/10 thigh   0/10 L knee pain      Objective:   2/20/2024  Slump Test: R: - and L +   SLR: R - and L +      At IE:  Lumbar AROM: (* denotes performed with pain)  Flexion: WNL, decreased fluidity of movement/ slow return with inc pain  Extension: WNL  Sidebending: R 50%; L 50%  Rotation: R 75%; L 75%    Assessment: H/L LTRs elicited radiating pain down L thigh for patient (3/10). Pt stated that she will endure pain if it means that she will get better. Education was provided to the patient to stop exercises that cause radiating pain down the leg, and to encourage exercises that allow centralization of sxs. Lumbar rotation mobs, PPUs, and repeated standing lumbar extension offered pt relief in pain. Standing shoulder ext also offered pt relief in shoulder pain, plan to add to HEP next visit.      number: [id number]      Goals: (to be met in 8-10 visits)   Pt will improve transversus abdominis recruitment to perform proper isometric contraction without requiring verbal or tactile cuing to promote advancement of therex   Pt will demonstrate good understanding of proper posture and body mechanics to decrease pain and improve spinal safety  Pt will report improved symptom centralization and absence of radicular symptoms for 3 consecutive days to improve function with ADL   Pt will have decreased  paraspinal mm tension to tolerate grocery shopping without relying on cart.   Pt will demonstrate improved core strength to be able to lift oxygen tank from ground to waist with good mechanics and <1/10 pain   Pt will be independent and compliant with comprehensive HEP to maintain progress achieved in PT     Plan: Treatment will include but is not limited to a progression of stretching, strengthening, functional movement training, and manual therapies.  TA step outs, continue pallof press, add standing shoulder ext next visit, update HEP next visit w/ shoulder ext  Date: 2/20/2024  TX#: 2/8-10 Date: 2/22/24                TX#: 3/8-10 Date:2/27/2024                TX#: 4/8-12 Date:  2/29/24               TX#: 5/8-10 Date:   Tx#: 6/   TE: 25 min  Supine piriformis stretch knee across chest 2 x 15 sec  BKTC orange SB x 20   SKTC 5 sec x 15   Clamshell x 20   Figure 4 stretch 3 x 30 sec   LTR x 20  TE: 28 min  DKTC w/ SB x15  SB push for tva in H/L x20  Hip abd/add MRE x10 ea  LTR x20  Side-lying L hip abd x10   Side-lying L clams x15   Sit to stands from lowest table height x15  NuStep L6 UE/LE 5' TE: 25 min  Nu-step lvl LE only - 5 min  SKTC 5 sec x 10   Clamshell x 20 L  Figure 4 in supine 15 sec x 5 R/L  BKTC SB x 15  H/L clamshell YTB x 15  Lumbar extension over table x 15 (responds very well, finding pain relief)           TE: 23'  NuStep L6 UE/LE 5'  LTR x15 (pain)  PPU x15 (relief)  Cat camels x15  Standing lumbar ext x15  H/L bridges x15                  MT: 10 min  -jt mob distraction L4-S1 in L S/L  -Lumbar jt mob \"break the bread\" STM/joint technique in L S/L   -Gapping mob gr 3: L4-5, L5-S1 in S/L  STM - L piriformis, QL, PS and glutes w/ piriformis release MT: 10 min  L4/5 rot mobs Gr lll  L4/5 central PA glides Gr lll/lV   MT: 15 min  -jt mob distraction L4-S1 in L S/L  -Lumbar jt mob \"break the bread\" STM/joint technique in L S/L (responds very well, finding pain relief)  -Gapping mob gr 3: L4-5, L5-S1  in S/L (responds very well, finding pain relief)  STM - L piriformis, QL, PS and glutes w/ piriformis release MT: 8'  L4-L5 Lumbar rotation mobilizations Gr lll    NE: 10 min  Posterior pelvic tilts 3 sec x 10  Sciatic nerve glides seated x 15  Sciatic nerve glides supine w/ towel assist x 15 NE: 7 min  PPT w/ march in H/L x15  L Sciatic nerve glides x20 ankle pumps      NMR: 15'  L Sciatic nerve glides supine x10  Standing pallof press RTB x15 ea  Standing shoulder ext YTB x15               HEP:   Access Code: [email or web address]  URL: [email or web address]  Date: 02/20/2024  Prepared by: Trey Torres  Exercises/descriptions provided in [place]    Exercises  - Supine Lower Trunk Rotation  - 1 x daily - 7 x weekly - 2 sets - 10 reps  - Seated Slump Nerve Glide  - 1 x daily - 7 x weekly - 2 sets - 10 reps  - Supine Figure 4 Piriformis Stretch  - 1 x daily - 7 x weekly - 3 sets - 1 reps - 30 sec hold  - Supine Posterior Pelvic Tilt  - 1 x daily - 7 x weekly - 2 sets - 10 reps - 3 sec hold    Charges: 2 TE; 1 NMR      Total Timed Treatment: 46 min  Total Treatment Time: 46 min

## 2024-03-05 ENCOUNTER — OFFICE VISIT (OUTPATIENT)
Dept: PHYSICAL THERAPY | Facility: HOSPITAL | Age: 59
End: 2024-03-05
Attending: ORTHOPAEDIC SURGERY
Payer: MEDICAID

## 2024-03-05 PROCEDURE — 97112 NEUROMUSCULAR REEDUCATION: CPT

## 2024-03-05 PROCEDURE — 97110 THERAPEUTIC EXERCISES: CPT

## 2024-03-05 NOTE — PROGRESS NOTES
Diagnosis:   Left lumbar radiculopathy (M54.16)       Referring Provider: Caridad  Date of Evaluation:    2/15/2024    Precautions:   recent L TKA , diabetes, OA Next MD visit:   none scheduled  Date of Surgery: n/a       Insurance Primary/Secondary: BLUE CROSS MEDICAID / N/A     # Auth Visits: 8            Subjective: Pt reports feeling good today with no back or knee pain. Pt states that her shoulder has been painful lately, 6/10 pain when she moves it a lot. Discussed spreading out last 2 appointments to 1x/week rather than 2x/week to prepare for d/c and focus on HEP and managing sxs at home in which pt agreed.     Pain:   Beginnin/10 low back pain   0/10 thigh pain  0/10 L knee  6/10 shoulder    End:   0/10 low back  0/10 thigh   0/10 L knee pain      Objective:   2024  Slump Test: R: - and L +   SLR: R - and L +    3/5/24:  Central PA L4/5: elicited sxs, no hypomobility noted  R Unilateral PA: no sxs, no hypomobiliy noted    At IE:  Lumbar AROM: (* denotes performed with pain)  Flexion: WNL, decreased fluidity of movement/ slow return with inc pain  Extension: WNL  Sidebending: R 50%; L 50%  Rotation: R 75%; L 75%    Assessment: Pt tolerated exercises well during this session. Updated HEP to incorporate UE strengthening and core stability in which pt demonstrated understanding. Central PA to L4/5 elicited pain for pt, with no radicular sxs with pain that subsided within seconds. Pt stated that standing shoulder ext and rows offered her slight relief in her shoulder pain. Will follow up with HEP ext session.     number: [id number]      Goals: (to be met in 8-10 visits)   Pt will improve transversus abdominis recruitment to perform proper isometric contraction without requiring verbal or tactile cuing to promote advancement of therex   Pt will demonstrate good understanding of proper posture and body mechanics to decrease pain and improve spinal safety  Pt will report improved symptom  centralization and absence of radicular symptoms for 3 consecutive days to improve function with ADL   Pt will have decreased paraspinal mm tension to tolerate grocery shopping without relying on cart.   Pt will demonstrate improved core strength to be able to lift oxygen tank from ground to waist with good mechanics and <1/10 pain   Pt will be independent and compliant with comprehensive HEP to maintain progress achieved in PT     Plan: Treatment will include but is not limited to a progression of stretching, strengthening, functional movement training, and manual therapies.  TA step outs, continue pallof press, follow up with HEP  Date: 2/20/2024  TX#: 2/8-10 Date: 2/22/24                TX#: 3/8-10 Date:2/27/2024                TX#: 4/8-12 Date:  2/29/24               TX#: 5/8-10 Date: 3/5/24  Tx#: 6/8-10    TE: 25 min  Supine piriformis stretch knee across chest 2 x 15 sec  BKTC orange SB x 20   SKTC 5 sec x 15   Clamshell x 20   Figure 4 stretch 3 x 30 sec   LTR x 20  TE: 28 min  DKTC w/ SB x15  SB push for tva in H/L x20  Hip abd/add MRE x10 ea  LTR x20  Side-lying L hip abd x10   Side-lying L clams x15   Sit to stands from lowest table height x15  NuStep L6 UE/LE 5' TE: 25 min  Nu-step lvl LE only - 5 min  SKTC 5 sec x 10   Clamshell x 20 L  Figure 4 in supine 15 sec x 5 R/L  BKTC SB x 15  H/L clamshell YTB x 15  Lumbar extension over table x 15 (responds very well, finding pain relief)           TE: 23'  NuStep L6 UE/LE 5'  LTR x15 (pain)  PPU x15 (relief)  Cat camels x15  Standing lumbar ext x15  H/L bridges x15     TE: 20'  NuStep L6 UE/LE 5'  LTR x15 ea  Clams RTB in H/L x15  Bridges w/ abd RTB 2x10  DKTC w/ SB 2x10  Cat camels x10  Updated HEP                    MT: 10 min  -jt mob distraction L4-S1 in L S/L  -Lumbar jt mob \"break the bread\" STM/joint technique in L S/L   -Gapping mob gr 3: L4-5, L5-S1 in S/L  STM - L piriformis, QL, PS and glutes w/ piriformis release MT: 10 min  L4/5 rot mobs Gr  lll  L4/5 central PA glides Gr lll/lV   MT: 15 min  -jt mob distraction L4-S1 in L S/L  -Lumbar jt mob \"break the bread\" STM/joint technique in L S/L (responds very well, finding pain relief)  -Gapping mob gr 3: L4-5, L5-S1 in S/L (responds very well, finding pain relief)  STM - L piriformis, QL, PS and glutes w/ piriformis release MT: 8'  L4-L5 Lumbar rotation mobilizations Gr lll MT: 5'  L4/5 Central and R U PA glide Gr lll     NE: 10 min  Posterior pelvic tilts 3 sec x 10  Sciatic nerve glides seated x 15  Sciatic nerve glides supine w/ towel assist x 15 NE: 7 min  PPT w/ march in H/L x15  L Sciatic nerve glides x20 ankle pumps      NMR: 15'  L Sciatic nerve glides supine x10  Standing pallof press RTB x15 ea  Standing shoulder ext YTB x15     NMR: 23'  L Sciatic nerve glides (ankle pumps) supine x15  Standing pallof press RTB x10 ea 5\" hold  Standing shoulder ext RTB x15  Standing rows RTB x10  H/L marches w/ PPT x15 ea            HEP:   Access Code: [email or web address]  URL: [email or web address]  Date: 02/20/2024  Prepared by: Trey Torres  Exercises/descriptions provided in Thai    Exercises  - Supine Lower Trunk Rotation  - 1 x daily - 7 x weekly - 2 sets - 10 reps  - Seated Slump Nerve Glide  - 1 x daily - 7 x weekly - 2 sets - 10 reps  - Supine Figure 4 Piriformis Stretch  - 1 x daily - 7 x weekly - 3 sets - 1 reps - 30 sec hold  - Supine Posterior Pelvic Tilt  - 1 x daily - 7 x weekly - 2 sets - 10 reps - 3 sec hold  - Standing rows RTB 20x ea  - Standing shoulder ext RTB x20 ea  - Standing Pallof press RTB x10 ea 5\" hold    Charges: 1 TE; 2 NMR      Total Timed Treatment: 48 min  Total Treatment Time: 48 min

## 2024-03-11 ENCOUNTER — APPOINTMENT (OUTPATIENT)
Dept: PHYSICAL THERAPY | Facility: HOSPITAL | Age: 59
End: 2024-03-11
Attending: ORTHOPAEDIC SURGERY
Payer: MEDICAID

## 2024-03-22 RX ORDER — GABAPENTIN 300 MG/1
300 CAPSULE ORAL 2 TIMES DAILY
Qty: 180 CAPSULE | Refills: 1 | OUTPATIENT
Start: 2024-03-22

## 2024-03-22 NOTE — TELEPHONE ENCOUNTER
Called patient and spoke with Daughter Sharri who was notified she still has 1 refill left.She will contact the pharmacy.

## 2024-03-28 ENCOUNTER — OFFICE VISIT (OUTPATIENT)
Dept: PHYSICAL THERAPY | Facility: HOSPITAL | Age: 59
End: 2024-03-28
Attending: ORTHOPAEDIC SURGERY
Payer: MEDICAID

## 2024-03-28 PROCEDURE — 97140 MANUAL THERAPY 1/> REGIONS: CPT

## 2024-03-28 PROCEDURE — 97110 THERAPEUTIC EXERCISES: CPT

## 2024-03-28 NOTE — PROGRESS NOTES
Diagnosis:   Left lumbar radiculopathy (M54.16)       Referring Provider: Caridad  Date of Evaluation:    2/15/2024    Precautions:   recent L TKA , diabetes, OA Next MD visit:   none scheduled  Date of Surgery: n/a       Insurance Primary/Secondary: BLUE CROSS MEDICAID / N/A     # Auth Visits: 8            Subjective: Pt reports having increased bilateral knee pain (L>R) with decreased back pain since last visit. Pt notes that walking and negotiating stairs increases her knee pain, as she states there has been increased swelling in L knee. Has been doing HEP which helps with her back. Her shoulder has been doing better compared to last session, slight pain when she raises her arm high.   number: [id number]  Pain:   Beginnin/10 low back pain   unrated/10 L knee      Objective:     3/28/24:  L knee palpation: TTT along anterior and lateral patella, warmth along lateral aspect of patella. No swelling noted.    Central PA L4/5: elicited sxs in low back- no radicular sxs, mild hypomobility noted  R Unilateral PA: no sxs, no hypomobiliy noted    Lumbar AROM: (* denotes performed with pain)  Flexion: WNL  Extension: WN;  Sidebending: R: WNL*; L: WNL  Rotation: R: WNL; L: WNL    At IE:  Lumbar AROM: (* denotes performed with pain)  Flexion: WNL, decreased fluidity of movement/ slow return with inc pain  Extension: WNL  Sidebending: R 50%; L 50%  Rotation: R 75%; L 75%    Assessment: Pt tolerated exercises during session. Tactile and verbal cueing required for pt to maintain transverse abdominis activation with hook-lying marches. Marches and DKTC elicited pain in anterior L knee. Pt informed that she has made great improvements in lumbar spine as noted with reassessment of her lumbar range of motion, accessory motion testing, and decreased pain indicating that she does not need therapy for her back, and next session can be her last session to which patient was agreeable. Updated HEP to incorporate  exercises to help her back that will not cause increased pain in her knees. Informed pt to contact her MD if her knee pain gets worse and is no longer tolerable.        Goals: (to be met in 8-10 visits)   Pt will improve transversus abdominis recruitment to perform proper isometric contraction without requiring verbal or tactile cuing to promote advancement of therex   Pt will demonstrate good understanding of proper posture and body mechanics to decrease pain and improve spinal safety  Pt will report improved symptom centralization and absence of radicular symptoms for 3 consecutive days to improve function with ADL   Pt will have decreased paraspinal mm tension to tolerate grocery shopping without relying on cart.   Pt will demonstrate improved core strength to be able to lift oxygen tank from ground to waist with good mechanics and <1/10 pain   Pt will be independent and compliant with comprehensive HEP to maintain progress achieved in PT     Plan: Prepare for discharge next visit  Date: 2/20/2024  TX#: 2/8-10 Date: 2/22/24                TX#: 3/8-10 Date:2/27/2024                TX#: 4/8-12 Date:  2/29/24               TX#: 5/8-10 Date: 3/5/24  Tx#: 6/8-10 Date: 3/28/24  Tx#: 7/8-10    TE: 25 min  Supine piriformis stretch knee across chest 2 x 15 sec  BKTC orange SB x 20   SKTC 5 sec x 15   Clamshell x 20   Figure 4 stretch 3 x 30 sec   LTR x 20  TE: 28 min  DKTC w/ SB x15  SB push for tva in H/L x20  Hip abd/add MRE x10 ea  LTR x20  Side-lying L hip abd x10   Side-lying L clams x15   Sit to stands from lowest table height x15  NuStep L6 UE/LE 5' TE: 25 min  Nu-step lvl LE only - 5 min  SKTC 5 sec x 10   Clamshell x 20 L  Figure 4 in supine 15 sec x 5 R/L  BKTC SB x 15  H/L clamshell YTB x 15  Lumbar extension over table x 15 (responds very well, finding pain relief)           TE: 23'  NuStep L6 UE/LE 5'  LTR x15 (pain)  PPU x15 (relief)  Cat camels x15  Standing lumbar ext x15  H/L bridges x15     TE:  20'  NuStep L6 UE/LE 5'  LTR x15 ea  Clams RTB in H/L x15  Bridges w/ abd RTB 2x10  DKTC w/ SB 2x10  Cat camels x10  Updated HEP               TE: 35'  Reassessment of lumbar AROM and accessory motion  Palpation assessment of L knee  DKTC w/ SB 2x10  Bridges with 3\" hold 2x10  LTRs x15  Side-lying clamshell x15 on L  Updated HEP            MT: 10 min  -jt mob distraction L4-S1 in L S/L  -Lumbar jt mob \"break the bread\" STM/joint technique in L S/L   -Gapping mob gr 3: L4-5, L5-S1 in S/L  STM - L piriformis, QL, PS and glutes w/ piriformis release MT: 10 min  L4/5 rot mobs Gr lll  L4/5 central PA glides Gr lll/lV   MT: 15 min  -jt mob distraction L4-S1 in L S/L  -Lumbar jt mob \"break the bread\" STM/joint technique in L S/L (responds very well, finding pain relief)  -Gapping mob gr 3: L4-5, L5-S1 in S/L (responds very well, finding pain relief)  STM - L piriformis, QL, PS and glutes w/ piriformis release MT: 8'  L4-L5 Lumbar rotation mobilizations Gr lll MT: 5'  L4/5 Central and R U PA glide Gr lll  MT: 8'  L4/5 Central and R/L U PA glide Gr lll       NE: 10 min  Posterior pelvic tilts 3 sec x 10  Sciatic nerve glides seated x 15  Sciatic nerve glides supine w/ towel assist x 15 NE: 7 min  PPT w/ march in H/L x15  L Sciatic nerve glides x20 ankle pumps      NMR: 15'  L Sciatic nerve glides supine x10  Standing pallof press RTB x15 ea  Standing shoulder ext YTB x15     NMR: 23'  L Sciatic nerve glides (ankle pumps) supine x15  Standing pallof press RTB x10 ea 5\" hold  Standing shoulder ext RTB x15  Standing rows RTB x10  H/L marches w/ PPT x15 ea NMR: 5'  L Sciatic nerve glides x15  L straight leg raise x15             HEP:   Access Code: [email or web address]  URL: [email or web address]  Date: 03/28/2024  Exercises/descriptions provided in [place]    Exercises  - Supine Lower Trunk Rotation  - 1 x daily - 7 x weekly - 2 sets - 10 reps  - Seated Slump Nerve Glide  - 1 x daily - 7 x weekly - 2 sets - 10 reps  - Supine Figure  4 Piriformis Stretch  - 1 x daily - 7 x weekly - 3 sets - 1 reps - 30 sec hold  - Supine Posterior Pelvic Tilt  - 1 x daily - 7 x weekly - 2 sets - 10 reps - 3 sec hold  - Standing rows RTB 20x ea  - Standing shoulder ext RTB x20 ea  - Standing Pallof press RTB x10 ea 5\" hold  - Side lying clamshells  - Bridges  - Straight leg raises    Charges: 2 TE; 1 MT      Total Timed Treatment: 48 min  Total Treatment Time: 48 min

## 2024-04-03 ENCOUNTER — LAB ENCOUNTER (OUTPATIENT)
Dept: LAB | Facility: HOSPITAL | Age: 59
End: 2024-04-03
Attending: NURSE PRACTITIONER
Payer: MEDICAID

## 2024-04-03 DIAGNOSIS — R74.01 NONSPECIFIC ELEVATION OF LEVELS OF TRANSAMINASE OR LACTIC ACID DEHYDROGENASE (LDH): ICD-10-CM

## 2024-04-03 DIAGNOSIS — R79.89 ELEVATED LIVER FUNCTION TESTS: ICD-10-CM

## 2024-04-03 DIAGNOSIS — E46 MALNUTRITION RELATED DIABETES MELLITUS (HCC): ICD-10-CM

## 2024-04-03 DIAGNOSIS — E78.5 HYPERLIPEMIA: ICD-10-CM

## 2024-04-03 DIAGNOSIS — R74.02 NONSPECIFIC ELEVATION OF LEVELS OF TRANSAMINASE OR LACTIC ACID DEHYDROGENASE (LDH): ICD-10-CM

## 2024-04-03 DIAGNOSIS — E11.9 TYPE 2 DIABETES MELLITUS WITHOUT COMPLICATION, WITHOUT LONG-TERM CURRENT USE OF INSULIN (HCC): ICD-10-CM

## 2024-04-03 DIAGNOSIS — E08.9 MALNUTRITION RELATED DIABETES MELLITUS (HCC): ICD-10-CM

## 2024-04-03 DIAGNOSIS — M79.7 FIBROMYALGIA: ICD-10-CM

## 2024-04-03 DIAGNOSIS — D75.838 SECONDARY THROMBOCYTOSIS: Primary | ICD-10-CM

## 2024-04-03 DIAGNOSIS — E78.5 HYPERLIPIDEMIA, UNSPECIFIED HYPERLIPIDEMIA TYPE: ICD-10-CM

## 2024-04-03 LAB
ALBUMIN SERPL-MCNC: 4.5 G/DL (ref 3.2–4.8)
ALBUMIN/GLOB SERPL: 1.4 {RATIO} (ref 1–2)
ALP LIVER SERPL-CCNC: 101 U/L
ALT SERPL-CCNC: 33 U/L
ANION GAP SERPL CALC-SCNC: 9 MMOL/L (ref 0–18)
AST SERPL-CCNC: 27 U/L (ref ?–34)
BASOPHILS # BLD AUTO: 0.06 X10(3) UL (ref 0–0.2)
BASOPHILS NFR BLD AUTO: 0.8 %
BILIRUB DIRECT SERPL-MCNC: 0.2 MG/DL (ref ?–0.3)
BILIRUB SERPL-MCNC: 0.6 MG/DL (ref 0.3–1.2)
BILIRUB UR QL: NEGATIVE
BUN BLD-MCNC: 7 MG/DL (ref 9–23)
BUN/CREAT SERPL: 14.6 (ref 10–20)
CALCIUM BLD-MCNC: 9.7 MG/DL (ref 8.7–10.4)
CHLORIDE SERPL-SCNC: 107 MMOL/L (ref 98–112)
CHOLEST SERPL-MCNC: 204 MG/DL (ref ?–200)
CLARITY UR: CLEAR
CO2 SERPL-SCNC: 26 MMOL/L (ref 21–32)
CREAT BLD-MCNC: 0.48 MG/DL
CREAT UR-SCNC: 85.1 MG/DL
DEPRECATED RDW RBC AUTO: 38.8 FL (ref 35.1–46.3)
EGFRCR SERPLBLD CKD-EPI 2021: 109 ML/MIN/1.73M2 (ref 60–?)
EOSINOPHIL # BLD AUTO: 0.36 X10(3) UL (ref 0–0.7)
EOSINOPHIL NFR BLD AUTO: 4.8 %
ERYTHROCYTE [DISTWIDTH] IN BLOOD BY AUTOMATED COUNT: 11.9 % (ref 11–15)
EST. AVERAGE GLUCOSE BLD GHB EST-MCNC: 126 MG/DL (ref 68–126)
FASTING PATIENT LIPID ANSWER: YES
FASTING STATUS PATIENT QL REPORTED: YES
GLOBULIN PLAS-MCNC: 3.2 G/DL (ref 2.8–4.4)
GLUCOSE BLD-MCNC: 94 MG/DL (ref 70–99)
GLUCOSE UR-MCNC: NORMAL MG/DL
HBA1C MFR BLD: 6 % (ref ?–5.7)
HCT VFR BLD AUTO: 42.1 %
HDLC SERPL-MCNC: 46 MG/DL (ref 40–59)
HGB BLD-MCNC: 14.4 G/DL
HGB UR QL STRIP.AUTO: NEGATIVE
IMM GRANULOCYTES # BLD AUTO: 0.02 X10(3) UL (ref 0–1)
IMM GRANULOCYTES NFR BLD: 0.3 %
KETONES UR-MCNC: NEGATIVE MG/DL
LDLC SERPL CALC-MCNC: 127 MG/DL (ref ?–100)
LEUKOCYTE ESTERASE UR QL STRIP.AUTO: NEGATIVE
LYMPHOCYTES # BLD AUTO: 3.67 X10(3) UL (ref 1–4)
LYMPHOCYTES NFR BLD AUTO: 48.9 %
MCH RBC QN AUTO: 30.4 PG (ref 26–34)
MCHC RBC AUTO-ENTMCNC: 34.2 G/DL (ref 31–37)
MCV RBC AUTO: 89 FL
MICROALBUMIN UR-MCNC: <0.3 MG/DL
MONOCYTES # BLD AUTO: 0.49 X10(3) UL (ref 0.1–1)
MONOCYTES NFR BLD AUTO: 6.5 %
NEUTROPHILS # BLD AUTO: 2.91 X10 (3) UL (ref 1.5–7.7)
NEUTROPHILS # BLD AUTO: 2.91 X10(3) UL (ref 1.5–7.7)
NEUTROPHILS NFR BLD AUTO: 38.7 %
NITRITE UR QL STRIP.AUTO: NEGATIVE
NONHDLC SERPL-MCNC: 158 MG/DL (ref ?–130)
OSMOLALITY SERPL CALC.SUM OF ELEC: 292 MOSM/KG (ref 275–295)
PH UR: 6.5 [PH] (ref 5–8)
PLATELET # BLD AUTO: 392 10(3)UL (ref 150–450)
POTASSIUM SERPL-SCNC: 3.8 MMOL/L (ref 3.5–5.1)
PROT SERPL-MCNC: 7.7 G/DL (ref 5.7–8.2)
PROT UR-MCNC: NEGATIVE MG/DL
RBC # BLD AUTO: 4.73 X10(6)UL
SODIUM SERPL-SCNC: 142 MMOL/L (ref 136–145)
SP GR UR STRIP: 1.02 (ref 1–1.03)
TRIGL SERPL-MCNC: 175 MG/DL (ref 30–149)
UROBILINOGEN UR STRIP-ACNC: NORMAL
VIT D+METAB SERPL-MCNC: 29.8 NG/ML (ref 30–100)
VLDLC SERPL CALC-MCNC: 31 MG/DL (ref 0–30)
WBC # BLD AUTO: 7.5 X10(3) UL (ref 4–11)

## 2024-04-03 PROCEDURE — 80061 LIPID PANEL: CPT

## 2024-04-03 PROCEDURE — 36415 COLL VENOUS BLD VENIPUNCTURE: CPT

## 2024-04-03 PROCEDURE — 82570 ASSAY OF URINE CREATININE: CPT

## 2024-04-03 PROCEDURE — 80053 COMPREHEN METABOLIC PANEL: CPT

## 2024-04-03 PROCEDURE — 83036 HEMOGLOBIN GLYCOSYLATED A1C: CPT

## 2024-04-03 PROCEDURE — 82248 BILIRUBIN DIRECT: CPT

## 2024-04-03 PROCEDURE — 85025 COMPLETE CBC W/AUTO DIFF WBC: CPT

## 2024-04-03 PROCEDURE — 82306 VITAMIN D 25 HYDROXY: CPT

## 2024-04-03 PROCEDURE — 82043 UR ALBUMIN QUANTITATIVE: CPT

## 2024-04-03 PROCEDURE — 81003 URINALYSIS AUTO W/O SCOPE: CPT

## 2024-04-04 ENCOUNTER — OFFICE VISIT (OUTPATIENT)
Dept: PHYSICAL THERAPY | Facility: HOSPITAL | Age: 59
End: 2024-04-04
Attending: ORTHOPAEDIC SURGERY
Payer: MEDICAID

## 2024-04-04 PROCEDURE — 97110 THERAPEUTIC EXERCISES: CPT

## 2024-04-04 NOTE — PROGRESS NOTES
Duyenisaiasmartracie  Pt has attended 8 visits in Physical Therapy.          Diagnosis:   Left lumbar radiculopathy (M54.16)       Referring Provider: Caridad  Date of Evaluation:    2/15/2024    Precautions:   recent L TKA , diabetes, OA Next MD visit:   none scheduled  Date of Surgery: n/a       Insurance Primary/Secondary: BLUE CROSS MEDICAID / N/A     # Auth Visits: 8            Subjective: Pt reports feeling great in the low back, no pain. Increased B knee pain since last visit (L>R) with flexion and when walking. Pt noted that when walking at the grocery store, her knee tends to buckle.   number: [id number]  Pain:   0/10 low back pain   7/10 L knee      Objective:     Lumbar AROM: (* denotes performed with pain)  Flexion: WNL (Eval: WNL, decreased fluidity of movement/ slow return with inc pain)  Extension: WNL (eval:WNL)  Sidebending: R: WNL; L: WNL (Eval: R 50%; L 50%)  Rotation: R: WNL; L: WNL (Eval: R 75%; L 75%)     Accessory motion: Lumbar Central and Unilateral PA glides: No hypomobility in lumbar spine, no sxs (Eval: min-mod hypomobility-- no sx provocation)  Palpation: not TTT along lumbar spine  L knee: TTT  along anterior and lateral patella, warmth along lateral aspect of patella.     Strength: (* denotes performed with pain)  LE LE 4/4/24   Hip flexion (L2): R 4/5; L 3+/5  Hip abduction: R 4+/5; L 3-/5  Hip Extension: R 4-/5; L 3-/5            Hip ER: R 5/5; L 5*/5  Hip IR: R 3+/5; L 4-*/5  Knee Flexion: R 5/5; L 4*/5            Knee extension (L3): R 5/5; L 4*/5            DF (L4): R NT/5; L NT/5  Great Toe Ext (L5): R NT/5, L NT/5  PF (S1): R NT/5; L NT/5 Hip flexion (L2): R 4+/5; L 4-*/5  Hip abduction: R 5/5; L 3+*/5  Hip Extension: R 4+/5; L 4+/5            Hip ER: R 5/5; L 4+*/5  Hip IR: R 4/5; L 3+*/5  Knee Flexion: R 5/5; L 4+*/5            Knee extension (L3): R 5/5; L 4+*/5            DF (L4): R 5/5; L 5/5  Great Toe Ext (L5): R NT/5, L NT/5  PF (OK[facility]) (S1): R 4/5; L 4/5       Flexibility:   LE LE 4/4/24   Hip Flexor: R NT, L NT  Hamstrings: R NT; L NT  Piriformis: R min-mod limitation; L Max limitation  Quads: R NT; L NT  Gastroc-soleus: R NT; L NT Hip Flexor: R NT, L NT  Hamstrings: R NT; L NT  Piriformis: Unable to test d/t increased bilateral knee pain  Quads: R NT; L NT  Gastroc-soleus: R NT; L NT      Special tests:   Slump Test: - bilaterally  (2/20/24: R: - and L +)  Straight Leg Raise: - bilaterally (2/20/2024: R - and L +)       Gait: pt ambulates on level ground with decreased stance phase L, increased L knee pain .  Balance: SLS R 7 sec, L 5 sec      Assessment: Reassessment of range of motion, strength and neural testing shows improvement and resolution in pt's lumbar radiculopathy symptoms-- no longer indicating a need for physical therapy for the low back. Pt is currently limited by bilateral knee pain (L>R) which limits her ability to tolerate manual muscle testing of the LE due to pain rather than weakness. Walking long distances and bending L knee elicits greatest amount of pain for pt. Added long arc quads to HEP for pt to focus on quad strengthening, with education provided to hault exercises if it causes an increase in her pain. Pt also instructed to seek MD if her knee pain does not improve, pt agreeable.       Goals: (to be met in 8-10 visits)   Pt will improve transversus abdominis recruitment to perform proper isometric contraction without requiring verbal or tactile cuing to promote advancement of therex (met)  Pt will demonstrate good understanding of proper posture and body mechanics to decrease pain and improve spinal safety (met)  Pt will report improved symptom centralization and absence of radicular symptoms for 3 consecutive days to improve function with ADL (met)  Pt will have decreased paraspinal mm tension to tolerate grocery shopping without relying on cart. (met)  Pt will demonstrate improved core strength to be able to lift oxygen tank from ground  to waist with good mechanics and <1/10 pain (met)  Pt will be independent and compliant with comprehensive HEP to maintain progress achieved in PT (met)    Date: 2/20/2024  TX#: 2/8-10 Date: 2/22/24                TX#: 3/8-10 Date:2/27/2024                TX#: 4/8-12 Date:  2/29/24               TX#: 5/8-10 Date: 3/5/24  Tx#: 6/8-10 Date: 3/28/24  Tx#: 7/8-10 Date: 4/4/24  Tx#: 8/8-10   TE: 25 min  Supine piriformis stretch knee across chest 2 x 15 sec  BKTC orange SB x 20   SKTC 5 sec x 15   Clamshell x 20   Figure 4 stretch 3 x 30 sec   LTR x 20  TE: 28 min  DKTC w/ SB x15  SB push for tva in H/L x20  Hip abd/add MRE x10 ea  LTR x20  Side-lying L hip abd x10   Side-lying L clams x15   Sit to stands from lowest table height x15  NuStep L6 UE/LE 5' TE: 25 min  Nu-step lvl LE only - 5 min  SKTC 5 sec x 10   Clamshell x 20 L  Figure 4 in supine 15 sec x 5 R/L  BKTC SB x 15  H/L clamshell YTB x 15  Lumbar extension over table x 15 (responds very well, finding pain relief)           TE: 23'  NuStep L6 UE/LE 5'  LTR x15 (pain)  PPU x15 (relief)  Cat camels x15  Standing lumbar ext x15  H/L bridges x15     TE: 20'  NuStep L6 UE/LE 5'  LTR x15 ea  Clams RTB in H/L x15  Bridges w/ abd RTB 2x10  DKTC w/ SB 2x10  Cat camels x10  Updated HEP               TE: 35'  Reassessment of lumbar AROM and accessory motion  Palpation assessment of L knee  DKTC w/ SB 2x10  Bridges with 3\" hold 2x10  LTRs x15  Side-lying clamshell x15 on L  Updated HEP         TE: 40'  Reassessment  Long arc quads in sitting x10 ea 5\" hold  Updated HEP     MT: 10 min  -jt mob distraction L4-S1 in L S/L  -Lumbar jt mob \"break the bread\" STM/joint technique in L S/L   -Gapping mob gr 3: L4-5, L5-S1 in S/L  STM - L piriformis, QL, PS and glutes w/ piriformis release MT: 10 min  L4/5 rot mobs Gr lll  L4/5 central PA glides Gr lll/lV   MT: 15 min  -jt mob distraction L4-S1 in L S/L  -Lumbar jt mob \"break the bread\" STM/joint technique in L S/L (responds very  well, finding pain relief)  -Gapping mob gr 3: L4-5, L5-S1 in S/L (responds very well, finding pain relief)  STM - L piriformis, QL, PS and glutes w/ piriformis release MT: 8'  L4-L5 Lumbar rotation mobilizations Gr lll MT: 5'  L4/5 Central and R U PA glide Gr lll  MT: 8'  L4/5 Central and R/L U PA glide Gr lll       NE: 10 min  Posterior pelvic tilts 3 sec x 10  Sciatic nerve glides seated x 15  Sciatic nerve glides supine w/ towel assist x 15 NE: 7 min  PPT w/ march in H/L x15  L Sciatic nerve glides x20 ankle pumps      NMR: 15'  L Sciatic nerve glides supine x10  Standing pallof press RTB x15 ea  Standing shoulder ext YTB x15     NMR: 23'  L Sciatic nerve glides (ankle pumps) supine x15  Standing pallof press RTB x10 ea 5\" hold  Standing shoulder ext RTB x15  Standing rows RTB x10  H/L marches w/ PPT x15 ea NMR: 5'  L Sciatic nerve glides x15  L straight leg raise x15             HEP:   Access Code: [email or web address]  URL: [email or web address]  Date: 04/4/2024  Exercises/descriptions provided in [place]    Exercises  - Supine Lower Trunk Rotation  - 1 x daily - 7 x weekly - 2 sets - 10 reps  - Seated Slump Nerve Glide  - 1 x daily - 7 x weekly - 2 sets - 10 reps  - Supine Figure 4 Piriformis Stretch  - 1 x daily - 7 x weekly - 3 sets - 1 reps - 30 sec hold  - Supine Posterior Pelvic Tilt  - 1 x daily - 7 x weekly - 2 sets - 10 reps - 3 sec hold  - Standing rows RTB 20x ea  - Standing shoulder ext RTB x20 ea  - Standing Pallof press RTB x10 ea 5\" hold  - Side lying clamshells  - Bridges  - Straight leg raises  - Long arc quads    Charges: 3 TE  Total Timed Treatment: 45 min  Total Treatment Time: 45 min    Plan: Patient is discharged from physical therapy, as has achieved goals of stay.    Thank you for your referral. If you have any questions, please contact me at Dept: [phone].    Sincerely,  Electronically signed by therapist: Erin Roman     Physician's certification required:  No  Please co-sign or sign  and return this letter via fax as soon as possible to [phone].   I certify the need for these services furnished under this plan of treatment and while under my care.    X___________________________________________________ Date____________________    Certification From: 4/4/2024  To:7/3/2024

## 2024-04-10 NOTE — TELEPHONE ENCOUNTER
From: Matilde Ferrera  To: Office of Dustin Mclean  Sent: 3/22/2024 10:04 AM CDT  Subject: Medication Renewal Request    Refills have been requested for the following medications:     aspirin 325 MG Oral Tab EC [Dustin Mclean]     acetaminophen 325 MG Oral Tab [Dustin Mclean]     calcium carbonate-vitamin D 250-3.125 MG-MCG Oral Tab [Dustin Mclean]     multivitamin Oral Chew Tab [Dustin Mclean]    Preferred pharmacy: [id number] [address] AT [facility], [phone], [phone]      Medication renewals requested in this message routed separately:     gabapentin 300 MG Oral Cap [Robina Emerson]

## 2024-04-29 NOTE — TELEPHONE ENCOUNTER
Requested Prescriptions     Pending Prescriptions Disp Refills    GABAPENTIN 300 MG Oral Cap [Pharmacy Med Name: GABAPENTIN 300MG CAPSULES] 180 capsule 1     Sig: TAKE ONE CAPSULE BY MOUTH EVERY MORNING AND 1 CAPSULE AT BEDTIME     Lf: 1/22/24 #180 cap w/ 1 rf  LOV: 1/22/24  Future Appointments   Date Time Provider Department Center   4/30/2024 11:00 AM Jovon Diego MD [id number]   5/6/2024 11:20 AM Robina Emerson MD [id number]     Labs:   Component      Latest Ref [facility] 4/3/2024   WBC      4.0 - 11.0 x10(3) uL 7.5    RBC      3.80 - 5.30 x10(6)uL 4.73    Hemoglobin      12.0 - 16.0 g/dL 14.4    Hematocrit      35.0 - 48.0 % 42.1    MCV      80.0 - 100.0 fL 89.0    MCH      26.0 - 34.0 pg 30.4    MCHC      31.0 - 37.0 g/dL 34.2    RDW-SD      35.1 - 46.3 fL 38.8    RDW      11.0 - 15.0 % 11.9    Platelet Count      150.0 - 450.0 10(3)uL 392.0    Prelim Neutrophil Abs      1.50 - 7.70 x10 (3) uL 2.91    Neutrophils Absolute      1.50 - 7.70 x10(3) uL 2.91    Lymphocytes Absolute      1.00 - 4.00 x10(3) uL 3.67    Monocytes Absolute      0.10 - 1.00 x10(3) uL 0.49    Eosinophils Absolute      0.00 - 0.70 x10(3) uL 0.36    Basophils Absolute      0.00 - 0.20 x10(3) uL 0.06    Immature Granulocyte Absolute      0.00 - 1.00 x10(3) uL 0.02    Neutrophils %      % 38.7    Lymphocytes %      % 48.9    Monocytes %      % 6.5    Eosinophils %      % 4.8    Basophils %      % 0.8    Immature Granulocyte %      % 0.3    Glucose      70 - 99 mg/dL 94    Sodium      136 - 145 mmol/L 142    Potassium      3.5 - 5.1 mmol/L 3.8    Chloride      98 - 112 mmol/L 107    Carbon Dioxide, Total      21.0 - 32.0 mmol/L 26.0    ANION GAP      0 - 18 mmol/L 9    BUN      9 - 23 mg/dL 7 (L)    CREATININE      0.55 - 1.02 mg/dL 0.48 (L)    BUN/CREATININE RATIO      10.0 - 20.0  14.6    CALCIUM      8.7 - 10.4 mg/dL 9.7    CALCULATED OSMOLALITY      275 - 295 mOsm/kg 292    EGFR      >=60 mL/min/1.73m2 109    ALT  (SGPT)      10 - 49 U/L 33    AST (SGOT)      <=34 U/L 27    ALKALINE PHOSPHATASE      46 - 118 U/L 101    Total Bilirubin      0.3 - 1.2 mg/dL 0.6    PROTEIN, TOTAL      5.7 - 8.2 g/dL 7.7    Albumin      3.2 - 4.8 g/dL 4.5    Globulin      2.8 - 4.4 g/dL 3.2    A/G Ratio      1.0 - 2.0  1.4    Patient Fasting for CMP? Yes       Legend:  (L) Low    Summary:   Refer to physiatry for lower back - for possible injections for sciatica   Check labs in march -    Cont.  gabapentin  increase to 300mg twice a day -   Return to clinic in 4-6 months.   Physical therpay for lower back            Robina Emerson MD  1/22/2024   3:26 PM

## 2024-04-30 RX ORDER — GABAPENTIN 300 MG/1
CAPSULE ORAL
Qty: 180 CAPSULE | Refills: 1 | Status: SHIPPED | OUTPATIENT
Start: 2024-04-30 | End: 2024-05-06

## 2024-05-06 ENCOUNTER — OFFICE VISIT (OUTPATIENT)
Dept: RHEUMATOLOGY | Facility: CLINIC | Age: 59
End: 2024-05-06

## 2024-05-06 VITALS
HEART RATE: 87 BPM | SYSTOLIC BLOOD PRESSURE: 111 MMHG | DIASTOLIC BLOOD PRESSURE: 77 MMHG | WEIGHT: 157 LBS | HEIGHT: 58 IN | BODY MASS INDEX: 32.95 KG/M2

## 2024-05-06 DIAGNOSIS — M54.42 CHRONIC LEFT-SIDED LOW BACK PAIN WITH LEFT-SIDED SCIATICA: ICD-10-CM

## 2024-05-06 DIAGNOSIS — G89.29 CHRONIC LEFT-SIDED LOW BACK PAIN WITH LEFT-SIDED SCIATICA: ICD-10-CM

## 2024-05-06 DIAGNOSIS — M79.7 FIBROMYALGIA: Primary | ICD-10-CM

## 2024-05-06 PROCEDURE — 99214 OFFICE O/P EST MOD 30 MIN: CPT | Performed by: INTERNAL MEDICINE

## 2024-05-06 RX ORDER — PREDNISONE 2.5 MG/1
5 TABLET ORAL DAILY
Qty: 180 TABLET | Refills: 0 | Status: CANCELLED | OUTPATIENT
Start: 2024-05-06

## 2024-05-06 RX ORDER — GABAPENTIN 300 MG/1
300 CAPSULE ORAL 3 TIMES DAILY
Qty: 270 CAPSULE | Refills: 1 | Status: SHIPPED | OUTPATIENT
Start: 2024-05-06

## 2024-05-06 NOTE — PROGRESS NOTES
Matilde Ferrera is a 59 year old female who presents for   Chief Complaint   Patient presents with    Fibromyalgia Syndrome    Knee Pain     Kodak    Hand Pain    Arm Pain     Right is worse    Ankle Pain          .   HPI:     I had the pleasure of seeing Matilde Ferrera on 8/14/2023 for evaluation.     She is a pleasant 58 year old with hx of DM, obestity, migraines, is here for diffuse. Pain. She  has osteoarthritis of the knee and diffuse body pain. She was referred by Dr. Almanza.   She's had severe body aches x 1 month.   She was before aching in her knees and told it was bone on bone.   She was going to do injections but she was diabetic and over time she was able to bring her hba1c was 6.5 last week.   She is planning to get injections for her knees now. She has appt. Tomorrow. With him.   She tried steroid injecitons in the past and they didn't work a couple of years aog.   She did physical thearpy a couple of years aog.   She is thinking of getting the gel injecitons. Sees [facility] tomorrow.   She did enrol her in senior PT by her town but not able to go b/c of diffuse boday ach e  Her shoudlers, fingers and just to touch her she is crying.     She's also taking care of her  at night - he has a trach and getting evaluated for lung transplante.d     She's getting alto of migraine headaches. They stopped for 6 years and now it's coming back.   She has noticed it 2-3 times it's coming a week.   The headaches are around her temples.   No jaw pain.   No loss of weight.   No fevers.   Started with injections for diabetes - dropped 6 lbs.   She saw ophthalmologist - 3 weeks ago and told it looked ok. 0 was having blurry vision on her right side     No rashes,,. No hx of psoriasis   No malar rash -   No patchy alopecia - diffuse hair loss -     9/8/2023  She finished the steroids yesterday.   She had a right temporal artery bx on 9/6/2023 - this was not showing GCA  It did help her feels better with the  sol.   She feels that her knees starting to get swollen again.   She felt better inher knees with the steroids as well.   Her visiton is better now too.   She took two tyelnol at night b/c of ongoing pain in her knees. Worse in her knees down -   She has about 8-9/10 pain - on her left nkte and down.   The other body pain is gone.     1/22/2024 -   She is off prendisone.   She fel the prednisoen did help her out.   Prefer ibuprofen than prednisone   She is feeling the gabpanetin 300mg at night is helping.   Her pain is a little bit better   Her pain is 4/10 pain.   She feels she gets flare of nerve pain   The pain shoots down her legs.   She gets frustatrted and tearful with that scitaic pain.   She was doing PT for her legs.   She is going to get injections - /and therapy for her back tomorrow -     5/6/2024  Used .  [id number] ronald  She has more shoulder pain and upper back pain   She has pain - the PT is helping her hips pain - It helps for her her hips.   She did not get injections - just therapy.   She is now taking gabapnetin 300mg bid - not sleepy with it   She has about 7/10 pain.   She has more pain in her shoulder - and hands   She has trouble going up stairs and it's more difficult.   She had a left knee replacement.       Wt Readings from Last 2 Encounters:   05/06/24 157 lb (71.2 kg)   01/22/24 161 lb 14.4 oz (73.4 kg)     Body mass index is 32.81 kg/m².      Current Outpatient Medications   Medication Sig Dispense Refill    Omega-3 Fatty Acids (OMEGA-3 FISH OIL OR) Take 2 tablets by mouth daily.      gabapentin 300 MG Oral Cap TAKE ONE CAPSULE BY MOUTH EVERY MORNING AND 1 CAPSULE AT BEDTIME 180 capsule 1    rosuvastatin 10 MG Oral Tab Take 1 tablet (10 mg total) by mouth nightly. 90 tablet 0    Dulaglutide (TRULICITY) 4.5 MG/0.5ML Subcutaneous Solution Pen-injector Inject 4.5 mg into the skin once a week. 6 mL 0    metFORMIN HCl 1000 MG Oral Tab Take 1 tablet (1,000 mg total) by  mouth daily with breakfast AND 0.5 tablets (500 mg total) daily with dinner.      aspirin 325 MG Oral Tab EC Take 1 tablet (325 mg total) by mouth in the morning and 1 tablet (325 mg total) before bedtime. Do not crush.  After 30 days, resume once daily aspirin 81 mg as you were doing before surgery.. 60 tablet 0    acetaminophen 325 MG Oral Tab Take 2 tablets (650 mg total) by mouth every 8 (eight) hours as needed for Pain. No more than 4000 mg acetaminophen/Tylenol per day from all sources.  Recall that each Norco has 325 mg of Tylenol in it. 60 tablet 0    calcium carbonate-vitamin D 250-3.125 MG-MCG Oral Tab Take 1 tablet by mouth 2 (two) times daily with meals. 60 tablet 0    multivitamin Oral Chew Tab Chew 1 tablet by mouth daily. 60 tablet 0    HYDROcodone-acetaminophen 7.5-325 MG Oral Tab Take 1 tablet by mouth every 6 (six) hours as needed for Pain. Maximum dose of acetaminophen is 4000 mg from all sources in 24 hours. (Patient not taking: Reported on 5/6/2024) 25 tablet 0    docusate sodium 100 MG Oral Cap Take 100 mg by mouth 2 (two) times daily. Do not crush.  Stop if loose stool. (Patient not taking: Reported on 5/6/2024) 20 capsule 0    ONETOUCH ULTRA In Vitro Strip TEST TWICE DAILY 200 strip 1      Past Medical History:    Diabetes (HCC)    Diabetes mellitus (HCC)    Esophageal reflux    Migraines    Osteoarthritis    RIGHT ARM PAIN, left knee      Past Surgical History:   Procedure Laterality Date    Cholecystectomy      Cystoscopy,insert ureteral stent      bladder sling 7 years ago    Hysterectomy      Tubal ligation        No family history on file.   Social History:  Social History     Socioeconomic History    Marital status:    Tobacco Use    Smoking status: Never    Smokeless tobacco: Never   Vaping Use    Vaping status: Never Used   Substance and Sexual Activity    Alcohol use: Not Currently    Drug use: Never    Sexual activity: Yes     Social Determinants of Health     Food  Insecurity: No Food Insecurity (11/3/2023)    Food Insecurity     Food Insecurity: Never true   Transportation Needs: No Transportation Needs (11/3/2023)    Transportation Needs     Lack of Transportation: No   Housing Stability: Low Risk  (11/3/2023)    Housing Stability     Housing Instability: No   Recent Concern: Housing Stability - At Risk (8/18/2023)    Received from [facility]           REVIEW OF SYSTEMS:   Review Of Systems:  Fatigue  Constitutional:No fever, no change in weight or appetitie  Derm: No rashes, no oral ulcers, no alopecia, no photosensitivity, no psoriasis  HEENT: No dry eyes, no dry mouth, no Raynaud's, no nasal ulcers, no parotid swelling, no neck pain, no jaw pain, no temple pain  Eyes: No visual changes,   CVS: No chest pain, no heart disease  RS: No SOB, no Cough, No Pleurtic pain,   GI: No nausea, no vomiiting, no abominal pain, no hx of ulcer, no gastritis, no heartburn, no dyshpagia, no BRBPR or melena  : no dysuria, no hx of miscarriages, no DVT Hx, no hx of OCP,   Neuro: No numbness or tingling, no headache, no hx of seizures,   Psych: no hx of anxiety or depression  ENDO: no hx of thyroid disease, no hx of DM  Joint/Muscluskeltal: see HPI,   All other ROS are negative.     EXAM:   /77 (BP Location: Left arm, Patient Position: Sitting, Cuff Size: large)   Pulse 87   Ht 4' 10\" (1.473 m)   Wt 157 lb (71.2 kg)   BMI 32.81 kg/m²   HEENT: Clear oropharynx, no oral ulcers, EOM intact, clear sclear, PERRLA, pleasant, no acute distress, no CAD,   No rashes  CVS: RRR, no murmurs  RS: CTAB, no crackles, no rhonchi  ABD: Soft Non tender, no HSM felt, BS positive  Joint exam:   No Temple tenderness  no neck tendnerness, good ROM,   Tender in bilateral shoulders difficult raising her arms up   Tender in b/l hips   Tender in b;l thigh   Tender in lower back   Tender in b/l knees - even in left knee which is replaced  EXTREMITIES: no cyanosis, clubbing or edema  NEURO:  intact touch, 5/5 ue and le strength    Component      Latest Ref Rng 6/23/2023 8/10/2023   GLUCOSE BLOOD  107    Test Strip Lot #      Numeric  [id number]    Test Strip Expiration Date      Date  1/26/24    HEMOGLOBIN A1C      4.3 - 5.6 %  6.5 !    Cartridge Lot#      Numeric  [id number]    Cartridge Expiration Date      Date  6/30/25    Hepatitis A Virus Ab, Total      Nonreactive   Reactive !     HBSAg Screen      Nonreactive   Nonreactive     HEPATITIS B CORE AB, TOTAL      Nonreactive   Nonreactive     HEPATITIS B SURFACE AB QUAL      Nonreactive   Nonreactive     HEPATITIS B SURFACE AB QUANT      mIU/mL <3.10     HCV AB      Nonreactive   Nonreactive     Iron, Serum      50 - 170 ug/dL 58     Transferrin      200 - 360 mg/dL 231     Iron Bind.Cap.(TIBC)      240 - 450 ug/dL 344     Iron Saturation      15 - 50 % 17     FERRITIN      18.0 - 340.0 ng/mL 21.4     HIV Antigen Antibody Combo      Non-Reactive  Non-Reactive     HEPATITIS A AB, IGM      Nonreactive   Nonreactive        Component      Latest Ref Rng 6/9/2023   Glucose      70 - 99 mg/dL 117 (H)    Sodium      136 - 145 mmol/L 143    Potassium      3.5 - 5.1 mmol/L 3.7    Chloride      98 - 112 mmol/L 108    Carbon Dioxide, Total      21.0 - 32.0 mmol/L 28.0    ANION GAP      0 - 18 mmol/L 7    BUN      7 - 18 mg/dL 7    CREATININE      0.55 - 1.02 mg/dL 0.48 (L)    BUN/CREATININE RATIO      10.0 - 20.0  14.6    CALCIUM      8.5 - 10.1 mg/dL 9.0    CALCULATED OSMOLALITY      275 - 295 mOsm/kg 295    eGFR-Cr      >=60 mL/min/1.73m2 110    ALT (SGPT)      13 - 56 U/L 95 (H)    AST (SGOT)      15 - 37 U/L 45 (H)    ALKALINE PHOSPHATASE      46 - 118 U/L 103    Total Bilirubin      0.1 - 2.0 mg/dL 0.6    PROTEIN, TOTAL      6.4 - 8.2 g/dL 7.7    Albumin      3.4 - 5.0 g/dL 3.8    Globulin      2.8 - 4.4 g/dL 3.9    A/G Ratio      1.0 - 2.0  1.0    Patient Fasting for CMP? Yes    Cholesterol, Total      <200 mg/dL 172    HDL Cholesterol      40 - 59 mg/dL 63  (H)    Triglycerides      30 - 149 mg/dL 122    LDL Cholesterol Calc      <100 mg/dL 88    VLDL      0 - 30 mg/dL 20    NON-HDL CHOLESTEROL      <130 mg/dL 109    Patient Fasting for Lipid? Yes    MALB URINE      mg/dL 1.51    CREATININE UR RANDOM      mg/dL 147.00    MALB/CRE CALC      <=30.0 ug/mg 10.3    TSH      0.358 - 3.740 mIU/mL 2.260    VITAMIN D, 25-OH, TOTAL      30.0 - 100.0 ng/mL 15.8 (L)         Component      Latest Ref [facility] 8/14/2023   MYELOPEROX ANTIBODIES, IGG      0.0 - 0.9 units <0.2    SERINE PROTEASE3, IGG      0.0 - 0.9 units <0.2    Cytoplasmic (C-ANCA)      Neg:<1:20 titer <1:20    Perinuclear (P-ANCA)      Neg:<1:20 titer <1:20    ATYPICAL PANCA      Neg:<1:20 titer <1:20    Aldolase      3.3 - 10.3 U/L 10.7 (H)    CK      26 - 192 U/L 80    C-REACTIVE PROTEIN      <0.30 mg/dL <0.29    SED RATE      0 - 30 mm/Hr 10    DUSTIN SCREEN WITH REFLEX (S)      Negative  Negative    C-Citrullinated Peptide IgG AB      0.0 - 6.9 U/mL 0.6    RHEUMATOID FACTOR      <15 IU/mL <10         Component      Latest Ref [facility] 4/3/2024   WBC      4.0 - 11.0 x10(3) uL 7.5    RBC      3.80 - 5.30 x10(6)uL 4.73    Hemoglobin      12.0 - 16.0 g/dL 14.4    Hematocrit      35.0 - 48.0 % 42.1    MCV      80.0 - 100.0 fL 89.0    MCH      26.0 - 34.0 pg 30.4    MCHC      31.0 - 37.0 g/dL 34.2    RDW-SD      35.1 - 46.3 fL 38.8    RDW      11.0 - 15.0 % 11.9    Platelet Count      150.0 - 450.0 10(3)uL 392.0    Prelim Neutrophil Abs      1.50 - 7.70 x10 (3) uL 2.91    Neutrophils Absolute      1.50 - 7.70 x10(3) uL 2.91    Lymphocytes Absolute      1.00 - 4.00 x10(3) uL 3.67    Monocytes Absolute      0.10 - 1.00 x10(3) uL 0.49    Eosinophils Absolute      0.00 - 0.70 x10(3) uL 0.36    Basophils Absolute      0.00 - 0.20 x10(3) uL 0.06    Immature Granulocyte Absolute      0.00 - 1.00 x10(3) uL 0.02    Neutrophils %      % 38.7    Lymphocytes %      % 48.9    Monocytes %      % 6.5    Eosinophils %      % 4.8    Basophils %       % 0.8    Immature Granulocyte %      % 0.3    Glucose      70 - 99 mg/dL 94    Sodium      136 - 145 mmol/L 142    Potassium      3.5 - 5.1 mmol/L 3.8    Chloride      98 - 112 mmol/L 107    Carbon Dioxide, Total      21.0 - 32.0 mmol/L 26.0    ANION GAP      0 - 18 mmol/L 9    BUN      9 - 23 mg/dL 7 (L)    CREATININE      0.55 - 1.02 mg/dL 0.48 (L)    BUN/CREATININE RATIO      10.0 - 20.0  14.6    CALCIUM      8.7 - 10.4 mg/dL 9.7    CALCULATED OSMOLALITY      275 - 295 mOsm/kg 292    EGFR      >=60 mL/min/1.73m2 109    ALT (SGPT)      10 - 49 U/L 33    AST (SGOT)      <=34 U/L 27    ALKALINE PHOSPHATASE      46 - 118 U/L 101    Total Bilirubin      0.3 - 1.2 mg/dL 0.6    PROTEIN, TOTAL      5.7 - 8.2 g/dL 7.7    Albumin      3.2 - 4.8 g/dL 4.5    Globulin      2.8 - 4.4 g/dL 3.2    A/G Ratio      1.0 - 2.0  1.4    Patient Fasting for CMP? Yes    Color Urine      Yellow  Light-Yellow    Clarity Urine      Clear  Clear    Spec Gravity      1.005 - 1.030  1.017    Glucose Urine      Normal mg/dL Normal    Bilirubin Urine      Negative  Negative    Ketones, UA      Negative mg/dL Negative    Blood Urine      Negative  Negative    PH Urine      5.0 - 8.0  6.5    Protein Urine      Negative mg/dL Negative    Urobilinogen Urine      Normal  Normal    Nitrite Urine      Negative  Negative    Leukocyte Esterase       Negative  Negative    Microscopic Microscopic not indicated    Cholesterol, Total      <200 mg/dL 204 (H)    HDL Cholesterol      40 - 59 mg/dL 46    Triglycerides      30 - 149 mg/dL 175 (H)    LDL Cholesterol Calc      <100 mg/dL 127 (H)    VLDL      0 - 30 mg/dL 31 (H)    NON-HDL CHOLESTEROL      <130 mg/dL 158 (H)    Patient Fasting for Lipid? Yes    MALB URINE      mg/dL <0.30    CREATININE UR RANDOM      mg/dL 85.10    MALB/CRE CALC --    HEMOGLOBIN A1c      <5.7 % 6.0 (H)    ESTIMATED AVERAGE GLUCOSE      68 - 126 mg/dL 126    VITAMIN D, 25-OH, TOTAL      30.0 - 100.0 ng/mL 29.8 (L)    Bilirubin,  Direct      <=0.3 mg/dL 0.2       Legend:  (L) Low  (H) High  5/15/2023 - b/l knee xray    1. Demineralization.   2. Moderate to severe osteoarthritis bilaterally.   3. Small joint effusions.   4. 1.78 cm calcification in the distal soft tissues of the thigh on the right.       ASSESSMENT AND PLAN:   Matilde Ferrera is a 59 year old female who presents for   Chief Complaint   Patient presents with    Fibromyalgia Syndrome    Knee Pain     Kodak    Hand Pain    Arm Pain     Right is worse    Ankle Pain            Diffuse polayrthraliga and myaliga - new onset headaches , back pain   - labs negative for  inflammatory arthritis and/or connective tissue disease   - not likely  PMR/temporal arteriris and temporal artery bx on 9/6/2023 - was normal -   - currently her vision is good -  optho 3 weeks ago , blurry vision 4 times - nono now   - finished prednisone 60mg -  60mg x 1 week, then 40mg x 1 week, then 20mg a day x 2 weeks , - started on 8/14/2023 -   - and now weaned off Prednsione 5mg x 3 days, and now off   But having more PMR symptoms.   - cont. gabapentin  300mg twice a day -   - rtc in 3- 4 months    Primary osteoarthritis of knees -   Getting left knee sx on 11/2023 -     3. DM - now 6.5 - , and was on 9.5 -   Started on truliciy, metformin and glipizide -   If sugars are too high - needs better contre.     4. Left sided sciatica lower back pain -   Send for physical therpay for lower back   Refer to physiatry for potential injections   Cont. gabpanetin to 300mg bid  F/u ortho     5. Vit d def -     6. Elevated liver tests in 6/2023 - reepeat in 3/2024 -is normal hold on nsaids at this time.     Summary:   Cont. Gabapentin - increase 300mg twice a day to 300mg three times a day -   Check labs today   If inflammation is high, it's then can send in prednisone.   4.     Return to clinic in 3 months.       Robina Emerson MD  5/6/2024   12:05 PM    Addendum:  Component      Latest Ref Rng 5/7/2024   SED RATE       0 - 30 mm/Hr 14    C-REACTIVE PROTEIN      <1.00 mg/dL <0.40    Did not send in prednisone  Robina Emerson MD  5/14/2024   6:53 AM

## 2024-05-06 NOTE — PATIENT INSTRUCTIONS
Cont. Gabapentin - increase 300mg twice a day to 300mg three times a day -   Check labs today   If inflammation is high, it's then can send in prednisone.   4.     Return to clinic in 3 months. 8/5/ at 2:10 pm

## 2024-05-07 ENCOUNTER — LAB ENCOUNTER (OUTPATIENT)
Dept: LAB | Facility: HOSPITAL | Age: 59
End: 2024-05-07
Attending: INTERNAL MEDICINE
Payer: MEDICAID

## 2024-05-07 DIAGNOSIS — M79.7 FIBROMYALGIA: ICD-10-CM

## 2024-05-07 LAB
CRP SERPL-MCNC: <0.4 MG/DL (ref ?–1)
ERYTHROCYTE [SEDIMENTATION RATE] IN BLOOD: 14 MM/HR

## 2024-05-07 PROCEDURE — 85652 RBC SED RATE AUTOMATED: CPT

## 2024-05-07 PROCEDURE — 36415 COLL VENOUS BLD VENIPUNCTURE: CPT

## 2024-05-07 PROCEDURE — 86140 C-REACTIVE PROTEIN: CPT

## 2024-05-16 ENCOUNTER — OFFICE VISIT (OUTPATIENT)
Dept: ENDOCRINOLOGY CLINIC | Facility: CLINIC | Age: 59
End: 2024-05-16

## 2024-05-16 VITALS
HEART RATE: 89 BPM | BODY MASS INDEX: 33.22 KG/M2 | SYSTOLIC BLOOD PRESSURE: 112 MMHG | HEIGHT: 58 IN | DIASTOLIC BLOOD PRESSURE: 60 MMHG | WEIGHT: 158.25 LBS

## 2024-05-16 DIAGNOSIS — E11.9 TYPE 2 DIABETES MELLITUS WITHOUT COMPLICATION, WITHOUT LONG-TERM CURRENT USE OF INSULIN (HCC): Primary | ICD-10-CM

## 2024-05-16 DIAGNOSIS — E78.5 HYPERLIPIDEMIA, UNSPECIFIED HYPERLIPIDEMIA TYPE: ICD-10-CM

## 2024-05-16 LAB
GLUCOSE BLOOD: 123
TEST STRIP LOT #: NORMAL NUMERIC

## 2024-05-16 PROCEDURE — 82947 ASSAY GLUCOSE BLOOD QUANT: CPT | Performed by: NURSE PRACTITIONER

## 2024-05-16 PROCEDURE — 99214 OFFICE O/P EST MOD 30 MIN: CPT | Performed by: NURSE PRACTITIONER

## 2024-05-16 NOTE — PROGRESS NOTES
: Roxann  ID# [id number]    Name: Matilde Ferrera  Date: 2024    CHIEF COMPLAINT   Chief Complaint   Patient presents with    Diabetes   HISTORY OF PRESENT ILLNESS   Matilde Ferrera is a 59 year old female who presents for follow up on diabetes management.   HbA1C: 6.0% on 4/3/2024. Previously was 6.6% on 11/10/2023.   Blood glucose: 123 in clinic today.   Patient notes that she has been feeling fair; has been having headache symptoms for the past 3 weeks that are occurring intermittently; patient suspects that this may be related to increased stress related to her  being in the hospital.  she also has generalized joint aches and she is followed by Dr. Emerson for this.   She continues to follow a low carb diet and denies any changes to activity. She continues to be compliant with all diabetes medications.     FAMILY HISTORY OF DIABETES  -mother   DIABETES HISTORY  Diagnosed: around 7 years ago   Prior HbA, C or glycohemoglobin were 9.4% 2023; 6.5% at POC today; 6.6% 11/10/2023; 6.0% on 4/3/2024;     Patient has not had hospitalizations for blood sugar issues.   Denies any history of pancreatitis.     PREVIOUS MEDICATION FOR DM:  -Glipizide -d/c'ed due to hypoglycemia     CURRENT MEDICATIONS FOR DM:  - Metformin 1,000mg with breakfast    1,000mg with dinner   - Jardiance 25mg once daily   - Trulicity 4.5mg once weekly - tolerating well; denies GI s/e     HOME GLUCOSE READINGS:   Testing BG x 1 daily  Meter or log book today: no   Fastin today; usually 107-106-101; never <90  After meals: has not been checking   Hypoglycemia: no - denies having symptoms of hypoglycemia/hyperglycemia     HISTORY OF DIABETES COMPLICATIONS:  History of Retinopathy: no - last eye exam within the last 12 months: yes - last exam was on 10/23/2023 with Dr. Ocampo   History of Neuropathy: yes - intermittently occurring   History of Nephropathy: no     ASSOCIATED COMPLICATIONS:   HTN: no    Hyperlipidemia: no   Cardiovascular Disease: no   Peripheral Vascular Disease: no     DIETARY COMPLIANCE:  Good; has been working on eating a low carb diet     EXERCISE:   No - due to muscle/joint aches     Polyuria, polyphagia, polydipsia: no   Paresthesias: yes - intermittent   Blurred vision:  yes  Recent steroids, illness or infections: no     REVIEW OF SYSTEMS  Constitutional: Negative for: weight change, fever, fatigue, cold/heat intolerance  Eyes: Negative for:  Visual changes, proptosis, blurring  ENT: Negative for:  dysphagia, neck swelling, dysphonia  Respiratory: Negative for: hemoptysis, shortness of breath, cough, or dyspnea.  Cardiovascular: Negative for:  chest pain, chest discomfort, palpitations  GI: Negative for:  abdominal pain, nausea, vomiting, diarrhea, heartburn, constipation  Neurology: Negative for: headache, dizziness, syncope, numbness/tingling, or weakness.   Genito-Urinary: Negative for: dysuria, frequency or hematuria   Hematology/Lymphatics: Negative for: bruising, easy bleeding, lower extremity edema  Skin: Negative for: rash, blister, infection or ulcers.  Endocrine: Negative for: polyuria, polydipsia. No osteoporosis. No thyroid disease.     MEDICATIONS:     Current Outpatient Medications:     Omega-3 Fatty Acids (OMEGA-3 FISH OIL OR), Take 2 tablets by mouth daily., Disp: , Rfl:     gabapentin 300 MG Oral Cap, Take 1 capsule (300 mg total) by mouth in the morning, at noon, and at bedtime., Disp: 270 capsule, Rfl: 1    rosuvastatin 10 MG Oral Tab, Take 1 tablet (10 mg total) by mouth nightly., Disp: 90 tablet, Rfl: 0    Dulaglutide (TRULICITY) 4.5 MG/0.5ML Subcutaneous Solution Pen-injector, Inject 4.5 mg into the skin once a week., Disp: 6 mL, Rfl: 0    metFORMIN HCl 1000 MG Oral Tab, Take 1 tablet (1,000 mg total) by mouth daily with breakfast AND 0.5 tablets (500 mg total) daily with dinner., Disp: , Rfl:     HYDROcodone-acetaminophen 7.5-325 MG Oral Tab, Take 1 tablet by mouth  every 6 (six) hours as needed for Pain. Maximum dose of acetaminophen is 4000 mg from all sources in 24 hours., Disp: 25 tablet, Rfl: 0    aspirin 325 MG Oral Tab EC, Take 1 tablet (325 mg total) by mouth in the morning and 1 tablet (325 mg total) before bedtime. Do not crush.  After 30 days, resume once daily aspirin 81 mg as you were doing before surgery.., Disp: 60 tablet, Rfl: 0    acetaminophen 325 MG Oral Tab, Take 2 tablets (650 mg total) by mouth every 8 (eight) hours as needed for Pain. No more than 4000 mg acetaminophen/Tylenol per day from all sources.  Recall that each Norco has 325 mg of Tylenol in it., Disp: 60 tablet, Rfl: 0    calcium carbonate-vitamin D 250-3.125 MG-MCG Oral Tab, Take 1 tablet by mouth 2 (two) times daily with meals., Disp: 60 tablet, Rfl: 0    docusate sodium 100 MG Oral Cap, Take 100 mg by mouth 2 (two) times daily. Do not crush.  Stop if loose stool., Disp: 20 capsule, Rfl: 0    multivitamin Oral Chew Tab, Chew 1 tablet by mouth daily., Disp: 60 tablet, Rfl: 0    ONETOUCH ULTRA In Vitro Strip, TEST TWICE DAILY (Patient not taking: Reported on 5/16/2024), Disp: 200 strip, Rfl: 1    ALLERGIES:   No Known Allergies    SOCIAL HISTORY:   Social History     Socioeconomic History    Marital status:    Tobacco Use    Smoking status: Never     Passive exposure: Never    Smokeless tobacco: Never   Vaping Use    Vaping status: Never Used   Substance and Sexual Activity    Alcohol use: Not Currently    Drug use: Never    Sexual activity: Yes       PAST MEDICAL HISTORY:   Past Medical History:    Diabetes (HCC)    Diabetes mellitus (HCC)    Esophageal reflux    Migraines    Osteoarthritis    RIGHT ARM PAIN, left knee     PAST SURGICAL HISTORY:   Past Surgical History:   Procedure Laterality Date    Cholecystectomy      Cystoscopy,insert ureteral stent      bladder sling 7 years ago    Hysterectomy      Tubal ligation       PHYSICAL EXAM:   Vitals:    05/16/24 1111 05/16/24 1142   BP:  (!) 87/57 112/60   Pulse: 89    Weight: 158 lb 4 oz (71.8 kg)    Height: 4' 10\" (1.473 m)      BMI:   Body mass index is 33.07 kg/m².  General Appearance:  alert, well developed, in no acute distress  Nutritional:  no extreme weight gain or loss  Head: Atraumatic  Eyes:  normal conjunctivae, sclera., normal sclera and normal pupils  Throat/Neck: normal sound to voice. Normal hearing, normal speech  Back: no kyphosis  Respiratory:  Speaking in full sentences, non-labored. no increased work of breathing, no audible wheezing    Skin:  normal moisture and skin texture, no visible lesions  Hair and nails: normal scalp hair  Hematologic:  no excessive bruising  Neuro: motor grossly intact, moving all extremities without difficulty  Psychiatric:  oriented to time, self, and place  Extremities: no obvious extremity swelling, no lesions    LABS: Pertinent labs reviewed    ASSESSMENT/PLAN:    -Reviewed with patient the pathogenesis of diabetes, clinical significance of A1c, and common complications such as: microvascular, macrovascular and diabetic ketoacidosis. Patient verbalizes understanding of the importance of glycemic control and the goals of therapy.   -Discussed with patient glucose targets ranges (Fasting  and post prandial <180).     1.Type 2 Diabetes Mellitus, controlled  -LAB DATA  HbA,C: 6.0% on 4/3/2024   a) Medications  - continue with Metformin 1,000mg twice daily   - continue Jardiance 25mg once daily in the morning  - continue with Trulicity 4.5mg once weekly     -discussed to continue limiting carbs to 30-45gm per meal/ max 135gm per day.   - continue to stay active as much as safely able - goal is at least 150 mins per week    -reviewed target goal BG readings and A1C  -reviewed when to call and notify me of abnormal BG readings.     b) No nephropathy: GFR: 109 and urine MA: <0.3mg/dL on 4/3/2024  c) UTD with optho - followed by Dr. Ocampo   D) foot exam: normal on 11/10/2023  e) cont.  testing BG  readings 1x daily - discussed to start alternating testing times with fasting or 2hrs after meals   f) Life style changes reviewed.     2. Blood Pressure Management   -repeat BP normal  - does not taken any anti-hypertensive medications   - educated patient on adequate hydration of at least 60oz of water per day.     3.Hyperlipidemia   - LDL: 127 and Tri on 4/3/2024  - atorvastatin -d/c'ed due to myalgia symptoms   -discussed per ADA  guidelines - LDL goal is <70   - on rosuvastatin 10mg at bedtime   - repeat lipid panel in 2024      RTC in 4 months   Patient instructed to call sooner if they develop Blood glucose readings <75 and/or if they have readings persistently >200.     The risks and benefits of my recommendations, as well as other treatment options were discussed with the patient today. questions were also answered to the best of my knowledge. Patient verbalizes understanding of these issues and agrees to the plan.    A total of 30 minutes was spent on obtaining history, reviewing pertinent labs, evaluating patient, providing multiple treatment options, reinforcing diet/exercise and compliance, and completing documentation.     2024  GAVINO Jiménez

## 2024-05-16 NOTE — PATIENT INSTRUCTIONS
A1C: 6.0% on 4/3/2024 --> previously was 6.6% on 11/10/2023  Blood glucose: 123 in clinic today    Medications:   - continue with Metformin 1,000mg twice daily   - continue Jardiance 25mg once daily in the morning  - continue with Trulicity 4.5mg once weekly   - continue with Rosuvastatin 10mg     -repeat lipid panel (cholesterol lab) in August 8/2024- fast for 10 hours prior     Weight:  Wt Readings from Last 6 Encounters:   05/06/24 157 lb (71.2 kg)   01/22/24 161 lb 14.4 oz (73.4 kg)   01/19/24 161 lb 14.4 oz (73.4 kg)   11/10/23 173 lb (78.5 kg)   11/03/23 166 lb (75.3 kg)   09/08/23 177 lb (80.3 kg)     A1C goal:  <7.0% (preferably <6.5%)    Blood sugar testing:  Test your blood sugar 1 time daily   Recommended times to test: Before breakfast (fasting) or 2hrs after meals     Blood sugar targets:  Before breakfast:  (preferably < 110)  Before meals OR 2 hours after meals: <180 (preferably <150)     Call for persistent blood sugars < 75 or > 200

## 2024-05-17 RX ORDER — DULAGLUTIDE 4.5 MG/.5ML
4.5 INJECTION, SOLUTION SUBCUTANEOUS WEEKLY
Qty: 6 ML | Refills: 0 | Status: SHIPPED | OUTPATIENT
Start: 2024-05-17

## 2024-05-17 NOTE — TELEPHONE ENCOUNTER
Refill request    Current Outpatient Medications   Medication Sig Dispense Refill                         Dulaglutide (TRULICITY) 4.5 MG/0.5ML Subcutaneous Solution Pen-injector Inject 4.5 mg into the skin once a week. 6 mL 0

## 2024-05-17 NOTE — TELEPHONE ENCOUNTER
Endocrine Refill protocol for oral and injectable diabetic medications    Protocol Criteria:    -Appointment with Endocrinology completed in the last 6 months or scheduled in the next 3 months    -A1c result <8.5% in the past 6 months      Verify the above has been completed or scheduled in the appropriate timeline. If so can send a 90 day supply with 1 refill.     Last completed office visit: 5/16/2024   Next scheduled Follow up: 09/19/24     Last A1c result: Last A1c value was 6% done 4/3/2024.

## 2024-05-28 ENCOUNTER — HOSPITAL ENCOUNTER (OUTPATIENT)
Dept: GENERAL RADIOLOGY | Facility: HOSPITAL | Age: 59
Discharge: HOME | End: 2024-05-28
Attending: ORTHOPAEDIC SURGERY

## 2024-05-28 ENCOUNTER — TELEPHONE (OUTPATIENT)
Dept: ORTHOPEDICS CLINIC | Facility: CLINIC | Age: 59
End: 2024-05-28

## 2024-05-28 ENCOUNTER — OFFICE VISIT (OUTPATIENT)
Dept: ORTHOPEDICS CLINIC | Facility: CLINIC | Age: 59
End: 2024-05-28

## 2024-05-28 DIAGNOSIS — E66.09 CLASS 1 OBESITY DUE TO EXCESS CALORIES WITH SERIOUS COMORBIDITY AND BODY MASS INDEX (BMI) OF 33.0 TO 33.9 IN ADULT: ICD-10-CM

## 2024-05-28 DIAGNOSIS — M17.11 PRIMARY OSTEOARTHRITIS OF RIGHT KNEE: Primary | ICD-10-CM

## 2024-05-28 DIAGNOSIS — M17.11 PRIMARY OSTEOARTHRITIS OF RIGHT KNEE: ICD-10-CM

## 2024-05-28 PROCEDURE — 99213 OFFICE O/P EST LOW 20 MIN: CPT | Performed by: ORTHOPAEDIC SURGERY

## 2024-05-28 PROCEDURE — 73562 X-RAY EXAM OF KNEE 3: CPT | Performed by: ORTHOPAEDIC SURGERY

## 2024-05-28 RX ORDER — ATORVASTATIN CALCIUM 10 MG/1
10 TABLET, FILM COATED ORAL NIGHTLY
COMMUNITY
Start: 2024-05-08

## 2024-05-28 NOTE — PROGRESS NOTES
NURSING INTAKE COMMENTS:   Chief Complaint   Patient presents with    Knee Pain     Language Line used Felisa ID# [id number]. Patient is here to discuss surgery for right knee. Patient rates her pain 6/10 at this time.        HPI: This 59 year old female presents today with language line for translation.  Replaced her left knee in November 2023 and she is doing well.  She has some pains but overall much better than before surgery.  Her motion is excellent.  She now wants the right knee replaced.  She has bone-on-bone arthritis on x-rays today.    She remains in pretty good shape physically.  Medical and social history unchanged since our last visit.  She is diabetic.    We prescribed physical therapy for some lumbar pain and it has improved.    Past Medical History:    Diabetes (HCC)    Diabetes mellitus (HCC)    Esophageal reflux    Migraines    Osteoarthritis    RIGHT ARM PAIN, left knee     Past Surgical History:   Procedure Laterality Date    Cholecystectomy      Cystoscopy,insert ureteral stent      bladder sling 7 years ago    Hysterectomy      Tubal ligation       Current Outpatient Medications   Medication Sig Dispense Refill    atorvastatin 10 MG Oral Tab Take 1 tablet (10 mg total) by mouth nightly. TAKE AT BEDTIME      Dulaglutide (TRULICITY) 4.5 MG/0.5ML Subcutaneous Solution Pen-injector Inject 4.5 mg into the skin once a week. 6 mL 0    Omega-3 Fatty Acids (OMEGA-3 FISH OIL OR) Take 2 tablets by mouth daily.      gabapentin 300 MG Oral Cap Take 1 capsule (300 mg total) by mouth in the morning, at noon, and at bedtime. 270 capsule 1    rosuvastatin 10 MG Oral Tab Take 1 tablet (10 mg total) by mouth nightly. 90 tablet 0    metFORMIN HCl 1000 MG Oral Tab Take 1 tablet (1,000 mg total) by mouth daily with breakfast AND 0.5 tablets (500 mg total) daily with dinner.      HYDROcodone-acetaminophen 7.5-325 MG Oral Tab Take 1 tablet by mouth every 6 (six) hours as needed for Pain. Maximum dose of acetaminophen  is 4000 mg from all sources in 24 hours. 25 tablet 0    aspirin 325 MG Oral Tab EC Take 1 tablet (325 mg total) by mouth in the morning and 1 tablet (325 mg total) before bedtime. Do not crush.  After 30 days, resume once daily aspirin 81 mg as you were doing before surgery.. 60 tablet 0    acetaminophen 325 MG Oral Tab Take 2 tablets (650 mg total) by mouth every 8 (eight) hours as needed for Pain. No more than 4000 mg acetaminophen/Tylenol per day from all sources.  Recall that each Norco has 325 mg of Tylenol in it. 60 tablet 0    calcium carbonate-vitamin D 250-3.125 MG-MCG Oral Tab Take 1 tablet by mouth 2 (two) times daily with meals. 60 tablet 0    docusate sodium 100 MG Oral Cap Take 100 mg by mouth 2 (two) times daily. Do not crush.  Stop if loose stool. 20 capsule 0    multivitamin Oral Chew Tab Chew 1 tablet by mouth daily. 60 tablet 0    ONETOUCH ULTRA In Vitro Strip TEST TWICE DAILY 200 strip 1     No Known Allergies  History reviewed. No pertinent family history.  No family Hx of DVT/PE    Social History     Occupational History    Not on file   Tobacco Use    Smoking status: Never     Passive exposure: Never    Smokeless tobacco: Never   Vaping Use    Vaping status: Never Used   Substance and Sexual Activity    Alcohol use: Not Currently    Drug use: Never    Sexual activity: Yes        Review of Systems:  GENERAL: feels generally well, no significant weight loss or weight gain  SKIN: no ulcerated or worrisome skin lesions  EYES:denies blurred vision or double vision  HEENT: denies new nasal congestion, sinus pain or ST  LUNGS: denies shortness of breath  CARDIOVASCULAR: denies chest pain  GI: no hematemesis, no worsening heartburn, no diarrhea  : no dysuria, no blood in urine, no difficulty urinating, no incontinence  MUSCULOSKELETAL: no other musculoskeletal complaints other than in HPI  NEURO: no numbness or tingling, no weakness or balance disorder  PSYCHE: no depression or anxiety  HEMATOLOGIC:  no hx of blood dyscrasia, no Hx DVT/PE  ENDOCRINE: no thyroid or diabetes issues  ALL/ASTHMA: no new hx of severe allergy or asthma    Physical Examination:    There were no vitals taken for this visit.  Constitutional: appears well hydrated, alert and responsive, no acute distress noted  Extremities: Left knee replacement incision healed well cosmetically.  Neither knee had asymmetric warmth or effusion.  Skin on both legs healthy without pitting edema or calf tenderness.  Musculoskeletal: Both knees had excellent motion from 0 to 130 degrees.  No instability.  Good patella tracking on the left knee.  Mostly patellofemoral tenderness and some crepitus for the right knee as well as medial joint line tenderness.  Left knee replacement with no medial or lateral tenderness.  Mild posterior tenderness.  Neurological: Normal motor and sensory bilateral lower extremities.    Imaging: X-rays show the left knee replacement well-fixed and in proper alignment.  The right knee has bone-on-bone medial osteoarthritis.      No results found.     Lab Results   Component Value Date    WBC 7.5 04/03/2024    HGB 14.4 04/03/2024    .0 04/03/2024      Lab Results   Component Value Date    GLU 94 04/03/2024    BUN 7 (L) 04/03/2024    CREATSERUM 0.48 (L) 04/03/2024        Assessment and Plan:  Diagnoses and all orders for this visit:    Primary osteoarthritis of right knee  -     XR KNEE (3 VIEWS), RIGHT (CPT=73562); Future  -     MRI KNEE PUNEET, RIGHT (CPT=73721); Future    Class 1 obesity due to excess calories with serious comorbidity and body mass index (BMI) of 33.0 to 33.9 in adult        Assessment: She wishes to replace the right knee.    Plan: We discussed risks of surgery such as death, heart attack, stroke, bleeding, infection, blood clot, nerve injury artery injury, fracture, stiffness, loosening, revision surgery, numbness lateral to incision, and persistent pain despite surgery.  She has questions which were  answered to her satisfaction.  She wishes to proceed.    She will need MRI templating, medical and dental clearance.  The plan will be a [facility].    Follow Up: No follow-ups on file.    Dustin Mclean MD

## 2024-05-28 NOTE — TELEPHONE ENCOUNTER
Ortho Surgery Request  Surgery: Right total knee arthroplasty with MRI templated patient specific instruments      Surgical Assistant: Shameka Phipps PA-C and Endy Coe CSA  Surgery Day Request:   Estimated Procedure Length: 2 hours  Anesthesia type: Spinal using Duramorph and MAC.   Position: Supine   Special Needs:[]Mini C-Arm  []Large C-arm  []Nurse Assist [x]SCD's [x]Surgical Assistant []Ultrasound []Ultrasound Elizabeth  Equipment: Ramírez persona yes  Location: Main OR  Post Op Visit Date: 10 to 12 days postop  CPT Code: 35599     ICD Code:  Medical Clearance Needed: Medical and dental  Preadmission Testing Orders:  Anesthesia testing protocols and anesthesia pre op orders will be used  Additional PAT orders:  Pre OP Orders:  Use the prophylactic antibiotic protocol  Additional Pre Op Orders:  PCN Allergy:  No  If Yes:   Proceed with PCN/Cephalosporin recommend antibiotic as benefit outweighs risk  Admit:  Outpatient in a bed  [facility]  Yes

## 2024-06-03 ENCOUNTER — HOSPITAL ENCOUNTER (OUTPATIENT)
Dept: MRI IMAGING | Age: 59
Discharge: HOME | End: 2024-06-03
Attending: ORTHOPAEDIC SURGERY
Payer: MEDICAID

## 2024-06-03 DIAGNOSIS — M17.11 PRIMARY OSTEOARTHRITIS OF RIGHT KNEE: ICD-10-CM

## 2024-06-03 PROCEDURE — 73721 MRI JNT OF LWR EXTRE W/O DYE: CPT | Performed by: ORTHOPAEDIC SURGERY

## 2024-06-11 DIAGNOSIS — M79.7 FIBROMYALGIA: Primary | ICD-10-CM

## 2024-06-11 NOTE — TELEPHONE ENCOUNTER
Requested Prescriptions     Pending Prescriptions Disp Refills    predniSONE 5 MG Oral Tab [Pharmacy Med Name: PREDNISONE 5MG TABLETS] 53 tablet 0     Sig: TAKE 6 TABLETS FOR 1 DAY, 5 TABLETS FOR 1 DAY,4 TABLETS FOR 1DAY, 3 TABS FOR 1 DAY, THEN 2 TABLETS A DAY     Future Appointments   Date Time Provider Department Center   7/10/2024 11:00 AM Caroline Chung DO UROG [facility]   8/20/2024  8:00 AM [facility]   9/19/2024 11:00 AM Lizzy Mendez APRN [facility]   9/23/2024 10:20 [facility]     LOV: 5/6/24  Last Refilled:  Labs:  Component      Latest Ref Rng 5/7/2024   SED RATE      0 - 30 mm/Hr 14    C-REACTIVE PROTEIN      <1.00 mg/dL <0.40      Summary:   Cont. Gabapentin - increase 300mg twice a day to 300mg three times a day -   Check labs today   If inflammation is high, it's then can send in prednisone.   4.     Return to clinic in 3 months.         Robina Emerson MD  5/6/2024   12:05 PM     Addendum:  Component      Latest Ref Rng 5/7/2024   SED RATE      0 - 30 mm/Hr 14    C-REACTIVE PROTEIN      <1.00 mg/dL <0.40    Did not send in prednisone  Robina Emerson MD  5/14/2024   6:53 AM

## 2024-06-12 RX ORDER — PREDNISONE 5 MG/1
5 TABLET ORAL DAILY
Qty: 30 TABLET | Refills: 3 | Status: SHIPPED | OUTPATIENT
Start: 2024-06-12

## 2024-06-12 NOTE — TELEPHONE ENCOUNTER
Called patient and spoke with Daughter Sharri who stated her mom's joints are hurting,currently taking tylenol and it seems to work a little bit.I advised patient the prednisone was send to her prefer pharmacy but the Prednisone will affect infection risk for her right knee surgery  that is scheduled for 8/8.Per daughter she mentioned then to forget the script and mom will just have to deal with her pain.     patient had labs done on 5/7,does she need the CRP and ESR done again?    Component      Latest Ref Rng 5/7/2024   SED RATE      0 - 30 mm/Hr 14    C-REACTIVE PROTEIN      <1.00 mg/dL <0.40

## 2024-06-12 NOTE — TELEPHONE ENCOUNTER
Can you Ask pt. To see how she does just on prednisone 5mg a day -   Prednisone will affect infection risk for her right knee surgery - which she is getting on 8/8 - I have just given her a small dose to be on at this time.   Can she check her inflammation markers - labs this week as well

## 2024-06-13 NOTE — TELEPHONE ENCOUNTER
Noted.    Sharri notified since Mother is staying off the prednisone then she doesn't need the esr and crp labs done.

## 2024-06-13 NOTE — TELEPHONE ENCOUNTER
If she is staying off the prednisone then she doesn't need the esr and crp - I wanted to get this checked prior to her going on prednisone -

## 2024-06-19 RX ORDER — DULAGLUTIDE 4.5 MG/.5ML
4.5 INJECTION, SOLUTION SUBCUTANEOUS WEEKLY
Qty: 6 ML | Refills: 1 | Status: SHIPPED | OUTPATIENT
Start: 2024-06-19

## 2024-06-19 NOTE — TELEPHONE ENCOUNTER
Endocrine Refill protocol for oral and injectable diabetic medications    Protocol Criteria: PASS    -Appointment with Endocrinology completed in the last 6 months or scheduled in the next 3 months    -A1c result <8.5% in the past 6 months      Verify the above has been completed or scheduled in the appropriate timeline. If so can send a 90 day supply with 1 refill.     Last completed office visit: 5/16/2024 Lizzy Mendez APRN     Next scheduled Follow up: 9/19/24    Future Appointments   Date Time Provider Department Center   7/10/2024 11:00 AM Caroline Chung DO UROG [facility]   8/20/2024  8:00 AM [facility]   9/19/2024 11:00 AM Lizzy Mendez APRN [facility]   9/23/2024 10:20 [facility]      Last A1c result: Last A1c value was 6% done 4/3/2024.

## 2024-06-19 NOTE — TELEPHONE ENCOUNTER
Dulaglutide (TRULICITY) 4.5 MG/0.5ML Subcutaneous Solution Pen-injector, Inject 4.5 mg into the skin once a week., Disp: 6 mL, Rfl: 0

## 2024-06-20 RX ORDER — BLOOD SUGAR DIAGNOSTIC
1 STRIP MISCELLANEOUS 2 TIMES DAILY
Qty: 200 STRIP | Refills: 1 | Status: SHIPPED | OUTPATIENT
Start: 2024-06-20

## 2024-06-20 NOTE — TELEPHONE ENCOUNTER
Refill request    Current Outpatient Medications   Medication Sig Dispense Refill                                                                                               ONETOUCH ULTRA In Vitro Strip TEST TWICE DAILY 200 strip 1

## 2024-06-20 NOTE — TELEPHONE ENCOUNTER
Endocrine Refill protocol for Glucose testing supplies       Protocol Criteria:PASSED  Appointment with Endocrinology completed in the last 12 months or scheduled in the next 6 months     Verify appointment has been completed or scheduled in the appropriate timeline. If so can send a 90 day supply with 1 refill.        Last completed office visit: 5/16/2024 Lizzy Mendez APRN   Next scheduled Follow up: 09/19/2024      90 day +1 refill pending

## 2024-06-21 RX ORDER — CALCIUM CARBONATE-CHOLECALCIFEROL TAB 250 MG-125 UNIT 250-125 MG-UNIT
1 TAB ORAL 2 TIMES DAILY WITH MEALS
Qty: 60 TABLET | Refills: 0 | OUTPATIENT
Start: 2024-06-21

## 2024-06-21 RX ORDER — ACETAMINOPHEN 325 MG/1
650 TABLET ORAL EVERY 8 HOURS PRN
Qty: 60 TABLET | Refills: 0 | OUTPATIENT
Start: 2024-06-21

## 2024-06-21 RX ORDER — ASPIRIN 325 MG
325 TABLET, DELAYED RELEASE (ENTERIC COATED) ORAL 2 TIMES DAILY
Qty: 60 TABLET | Refills: 0 | OUTPATIENT
Start: 2024-06-21

## 2024-07-08 RX ORDER — ROSUVASTATIN CALCIUM 10 MG/1
10 TABLET, COATED ORAL NIGHTLY
Qty: 90 TABLET | Refills: 0 | Status: SHIPPED | OUTPATIENT
Start: 2024-07-08

## 2024-07-08 NOTE — TELEPHONE ENCOUNTER
Endocrine Refill protocol for oral antihypertensive medications    Protocol Criteria:pass    -Appointment with Endocrinology completed in the last 6 months or scheduled in the next 3 months    -BMP or CMP has been completed in the last 12 months     -GFR is greater than or equal to 50    Verify the above has been completed or scheduled in the appropriate timeline. If so can send a 90 day supply with 1 refill.   Verify BMP or CMP has been completed in last year  Verify last GFR result     Last completed office visit:5/16/2024 Lizzy Mendez APRN   Next scheduled Follow up: 09/19/24     Last BMP or CMP completion date:04/03/24

## 2024-07-10 ENCOUNTER — OFFICE VISIT (OUTPATIENT)
Dept: UROLOGY | Facility: HOSPITAL | Age: 59
End: 2024-07-10
Attending: OBSTETRICS & GYNECOLOGY
Payer: MEDICAID

## 2024-07-10 VITALS — HEIGHT: 58 IN | BODY MASS INDEX: 33.17 KG/M2 | WEIGHT: 158 LBS | RESPIRATION RATE: 18 BRPM

## 2024-07-10 DIAGNOSIS — N81.84 PELVIC MUSCLE WASTING: ICD-10-CM

## 2024-07-10 DIAGNOSIS — N95.2 POSTMENOPAUSAL ATROPHIC VAGINITIS: ICD-10-CM

## 2024-07-10 DIAGNOSIS — N39.41 URGE INCONTINENCE: Primary | ICD-10-CM

## 2024-07-10 DIAGNOSIS — R35.1 NOCTURIA: ICD-10-CM

## 2024-07-10 DIAGNOSIS — R10.2 PELVIC PAIN: ICD-10-CM

## 2024-07-10 DIAGNOSIS — N95.2 POST-MENOPAUSAL ATROPHIC VAGINITIS: ICD-10-CM

## 2024-07-10 DIAGNOSIS — R39.14 FEELING OF INCOMPLETE BLADDER EMPTYING: ICD-10-CM

## 2024-07-10 LAB
BLOOD URINE: NEGATIVE
CONTROL RUN WITHIN 24 HOURS?: YES
LEUKOCYTE ESTERASE URINE: NEGATIVE
NITRITE URINE: NEGATIVE

## 2024-07-10 PROCEDURE — 81002 URINALYSIS NONAUTO W/O SCOPE: CPT

## 2024-07-10 PROCEDURE — 81002 URINALYSIS NONAUTO W/O SCOPE: CPT | Performed by: OBSTETRICS & GYNECOLOGY

## 2024-07-10 PROCEDURE — 87086 URINE CULTURE/COLONY COUNT: CPT | Performed by: OBSTETRICS & GYNECOLOGY

## 2024-07-10 PROCEDURE — 99212 OFFICE O/P EST SF 10 MIN: CPT

## 2024-07-10 PROCEDURE — 51701 INSERT BLADDER CATHETER: CPT | Performed by: OBSTETRICS & GYNECOLOGY

## 2024-07-10 RX ORDER — ESTRADIOL 0.1 MG/G
CREAM VAGINAL
Qty: 42.5 G | Refills: 3 | Status: SHIPPED | OUTPATIENT
Start: 2024-07-10

## 2024-07-10 NOTE — PROGRESS NOTES
Caroline Chung,   7/10/2024     Referred by self  Pt here with daughter    Chief Complaint   Patient presents with    Other     Pain left \"groin area which radiates down to knee\"  X 2008     Groin pain since bladder surg in 2008    HPI:  No CHINTAN  +UUI  Nocturia x6  Denies prolapse sx  Daughter reports bulge sx  Not sexually active, n/a dyspareunia  Reg bowels  Sense of incomplete bladder emptying    PRIOR TREATMENTS:    Kegels  Surgery in past  Hy2019 EVER BSO     No reg UTIs  No gross hematuria     vdx5    Vitals:  Resp 18   Ht 58\"   Wt 158 lb (71.7 kg)   BMI 33.02 kg/m²      HISTORY:  Past Medical History:    Diabetes (HCC)    Diabetes mellitus (HCC)    Esophageal reflux    Migraines    Osteoarthritis    RIGHT ARM PAIN, left knee      Past Surgical History:   Procedure Laterality Date    Cholecystectomy      Cystoscopy,insert ureteral stent      bladder sling 7 years ago    Hysterectomy      Tubal ligation        No family history on file.   Social History     Socioeconomic History    Marital status:    Tobacco Use    Smoking status: Never     Passive exposure: Never    Smokeless tobacco: Never   Vaping Use    Vaping status: Never Used   Substance and Sexual Activity    Alcohol use: Not Currently    Drug use: Never    Sexual activity: Yes     Social Determinants of Health     Food Insecurity: No Food Insecurity (11/3/2023)    Food Insecurity     Food Insecurity: Never true   Transportation Needs: No Transportation Needs (11/3/2023)    Transportation Needs     Lack of Transportation: No   Housing Stability: Low Risk  (11/3/2023)    Housing Stability     Housing Instability: No   Recent Concern: Housing Stability - At Risk (2023)    Received from [facility]        Allergies:  No Known Allergies    Medications:  Outpatient Medications Prior to Visit   Medication Sig Dispense Refill    ROSUVASTATIN 10 MG Oral Tab TAKE 1 TABLET(10 MG) BY MOUTH EVERY NIGHT 90 tablet 0    Glucose  Blood (ONETOUCH ULTRA) In Vitro Strip 1 each by Other route in the morning and 1 each before bedtime. 200 strip 1    Omega-3 Fatty Acids (OMEGA-3 FISH OIL OR) Take 2 tablets by mouth daily.      gabapentin 300 MG Oral Cap Take 1 capsule (300 mg total) by mouth in the morning, at noon, and at bedtime. 270 capsule 1    metFORMIN HCl 1000 MG Oral Tab Take 1 tablet (1,000 mg total) by mouth daily with breakfast AND 0.5 tablets (500 mg total) daily with dinner.      aspirin 325 MG Oral Tab EC Take 1 tablet (325 mg total) by mouth in the morning and 1 tablet (325 mg total) before bedtime. Do not crush.  After 30 days, resume once daily aspirin 81 mg as you were doing before surgery.. 60 tablet 0    acetaminophen 325 MG Oral Tab Take 2 tablets (650 mg total) by mouth every 8 (eight) hours as needed for Pain. No more than 4000 mg acetaminophen/Tylenol per day from all sources.  Recall that each Norco has 325 mg of Tylenol in it. 60 tablet 0    calcium carbonate-vitamin D 250-3.125 MG-MCG Oral Tab Take 1 tablet by mouth 2 (two) times daily with meals. 60 tablet 0    Dulaglutide (TRULICITY) 4.5 MG/0.5ML Subcutaneous Solution Pen-injector Inject 4.5 mg into the skin once a week. (Patient not taking: Reported on 7/10/2024) 6 mL 1    predniSONE 5 MG Oral Tab Take 1 tablet (5 mg total) by mouth daily. (Patient not taking: Reported on 7/10/2024) 30 tablet 3    HYDROcodone-acetaminophen 7.5-325 MG Oral Tab Take 1 tablet by mouth every 6 (six) hours as needed for Pain. Maximum dose of acetaminophen is 4000 mg from all sources in 24 hours. (Patient not taking: Reported on 7/10/2024) 25 tablet 0    docusate sodium 100 MG Oral Cap Take 100 mg by mouth 2 (two) times daily. Do not crush.  Stop if loose stool. (Patient not taking: Reported on 7/10/2024) 20 capsule 0    multivitamin Oral Chew Tab Chew 1 tablet by mouth daily. (Patient not taking: Reported on 7/10/2024) 60 tablet 0     No facility-administered medications prior to visit.        Urogynecology Summary:  Urogynecology Summary  Prolapse: No  CHINTAN: No  Urge Incontinence: Yes  Nocturia Frequency: Greater than 4 (Reports 6 times per night.)  Frequency: Less than 1 hour  Incomplete emptying: Yes (Reports sense of incomplete emptying and shifts positions to void.)  Constipation: No  Wears pad day?: 0  Wears Pad Night?: 0  Activities are limited by UI/POP?: No  Currently Sexually Active: No  Avoids sexual activity due to: Other (. By choice.)    Review of Systems:    A comprehensive 12 point review of systems was completed.  Pertinent positives noted in the the HPI.  No CP  No SOB    GENERAL EXAM:  GENERAL:  Alert and Oriented, and NAD  HEENT:  Normal, no lesions  LUNGS:  Normal effort  HEART:  RRR  ABDOMEN: soft, no mass, no hernia  EXTREM:  Normal, no edema  SKIN:  Normal, no lesions    PELVIC EXAM:  Ext. Gen: +atrophy, no lesions  Urethra: +atrophy, nontender  Bladder:+fullness, nontender  Vagina: +atrophy  Cervix & Uterus: absent  Adnexa:no masses, nontender  Perineum: nontender  Anus: wnl  Rectum: defer    PELVIS FLOOR NEUROMUSCULAR FUNCTION:  Strength:  1, Unable to hold greater than 3 sec, and Tender  Perineal Sensation:  Normal      PELVIC SUPPORT:  Grayson:  1  Ant:  1  Post:  1  CST:  negative  UVJ: +hypermobile    Pt examined with chaperone, RN  Verbal consent obtained  Sterile technique used to empty bladder, specimen sent      Impression/Plan:    ICD-10-CM    1. Urge incontinence  N39.41 Straight Cath PVR     POCT urinalysis dipstick[92873]     Urine Culture, Routine     PHYSICAL THERAPY - External Location      2. Post-menopausal atrophic vaginitis  N95.2 estradiol (ESTRACE) 0.1 MG/GM Vaginal Cream      3. Pelvic muscle wasting  N81.84 PHYSICAL THERAPY - External Location      4. Postmenopausal atrophic vaginitis  N95.2 estradiol (ESTRACE) 0.1 MG/GM Vaginal Cream      5. Pelvic pain  R10.2       6. Nocturia  R35.1       7. Feeling of incomplete bladder emptying  R39.14            Discussion Items:   Urodynamics and cystoscopy for evaluation of LUTS  Behavioral and pharmacologic treatments for OAB  Nonsurgical and surgical treatments for POP  Pelvic muscle rehabilitation including pelvic floor PT  Topical estrogen therapy for treating UGA  Bowel routine/constipation regimen  Discussed dietary and behavioral modification, discussed pharmacologic and nonpharmacologic mgmt options for urinary symptoms. Discussed dietary & weight management with potential improvements in symptoms with weight loss.    Diagnostic Items:  Urodynamics  Urine testing    Medications Discussed:  Estrace Cream    Treatment Plan, Non-surgical:   RN teaching/pt education done  Estrace / Premarin cream    Treatment Plan, Surgical:   None    Pt verbalizes understanding of all above discussed information. All questions answered. She agrees to plan    Return for UDS, f/u.    Caroline Chung, DO, FACOG, FACS      Discussion undertaken in English, info provided      The 21st Century Cures Act makes medical notes like these available to patients in the interest of transparency. However, be advised this is a medical document. It is intended as peer to peer communication. It is written in medical language and may contain abbreviations or verbiage that are unfamiliar. It may appear blunt or direct. Medical documents are intended to carry relevant information, facts as evident, and the clinical opinion of the practitioner.

## 2024-07-12 RX ORDER — ROSUVASTATIN CALCIUM 10 MG/1
10 TABLET, COATED ORAL NIGHTLY
Qty: 90 TABLET | Refills: 0 | Status: SHIPPED | OUTPATIENT
Start: 2024-07-12

## 2024-07-12 NOTE — TELEPHONE ENCOUNTER
Endocrine Refill protocol for oral antihypertensive medications    Protocol Criteria:pass    -Appointment with Endocrinology completed in the last 6 months or scheduled in the next 3 months    -BMP or CMP has been completed in the last 12 months     -GFR is greater than or equal to 50    Verify the above has been completed or scheduled in the appropriate timeline. If so can send a 90 day supply with 1 refill.   Verify BMP or CMP has been completed in last year  Verify last GFR result     Last completed office visit:5/16/2024 Lizzy Mendez APRN   Next scheduled Follow up: 09/19/24     Last BMP or CMP completion date:04/3/24

## 2024-07-23 RX ORDER — GABAPENTIN 300 MG/1
300 CAPSULE ORAL 3 TIMES DAILY
Qty: 270 CAPSULE | Refills: 1 | OUTPATIENT
Start: 2024-07-23

## 2024-07-26 ENCOUNTER — LAB ENCOUNTER (OUTPATIENT)
Dept: LAB | Facility: HOSPITAL | Age: 59
End: 2024-07-26
Attending: FAMILY MEDICINE
Payer: MEDICAID

## 2024-07-26 DIAGNOSIS — M79.7 FIBROMYALGIA: ICD-10-CM

## 2024-07-26 DIAGNOSIS — D64.9 ANEMIA, UNSPECIFIED: ICD-10-CM

## 2024-07-26 DIAGNOSIS — E46 MALNUTRITION RELATED DIABETES MELLITUS (HCC): ICD-10-CM

## 2024-07-26 DIAGNOSIS — R82.90 NONSPECIFIC FINDING ON EXAMINATION OF URINE: ICD-10-CM

## 2024-07-26 DIAGNOSIS — R74.02 NONSPECIFIC ELEVATION OF LEVELS OF TRANSAMINASE OR LACTIC ACID DEHYDROGENASE (LDH): Primary | ICD-10-CM

## 2024-07-26 DIAGNOSIS — E78.5 HYPERLIPIDEMIA, UNSPECIFIED HYPERLIPIDEMIA TYPE: ICD-10-CM

## 2024-07-26 DIAGNOSIS — R74.01 NONSPECIFIC ELEVATION OF LEVELS OF TRANSAMINASE OR LACTIC ACID DEHYDROGENASE (LDH): Primary | ICD-10-CM

## 2024-07-26 DIAGNOSIS — E08.9 MALNUTRITION RELATED DIABETES MELLITUS (HCC): ICD-10-CM

## 2024-07-26 LAB
ALBUMIN SERPL-MCNC: 4.4 G/DL (ref 3.2–4.8)
ALBUMIN/GLOB SERPL: 1.5 {RATIO} (ref 1–2)
ALP LIVER SERPL-CCNC: 101 U/L
ALT SERPL-CCNC: 38 U/L
ANION GAP SERPL CALC-SCNC: 8 MMOL/L (ref 0–18)
AST SERPL-CCNC: 31 U/L (ref ?–34)
BASOPHILS # BLD AUTO: 0.05 X10(3) UL (ref 0–0.2)
BASOPHILS NFR BLD AUTO: 0.7 %
BILIRUB SERPL-MCNC: 0.7 MG/DL (ref 0.3–1.2)
BILIRUB UR QL: NEGATIVE
BUN BLD-MCNC: 9 MG/DL (ref 9–23)
BUN/CREAT SERPL: 18.4 (ref 10–20)
CALCIUM BLD-MCNC: 9.4 MG/DL (ref 8.7–10.4)
CHLORIDE SERPL-SCNC: 108 MMOL/L (ref 98–112)
CHOLEST SERPL-MCNC: 113 MG/DL (ref ?–200)
CLARITY UR: CLEAR
CO2 SERPL-SCNC: 26 MMOL/L (ref 21–32)
CREAT BLD-MCNC: 0.49 MG/DL
CRP SERPL-MCNC: <0.4 MG/DL (ref ?–1)
DEPRECATED RDW RBC AUTO: 40.8 FL (ref 35.1–46.3)
EGFRCR SERPLBLD CKD-EPI 2021: 109 ML/MIN/1.73M2 (ref 60–?)
EOSINOPHIL # BLD AUTO: 0.34 X10(3) UL (ref 0–0.7)
EOSINOPHIL NFR BLD AUTO: 4.7 %
ERYTHROCYTE [DISTWIDTH] IN BLOOD BY AUTOMATED COUNT: 12.5 % (ref 11–15)
ERYTHROCYTE [SEDIMENTATION RATE] IN BLOOD: 10 MM/HR
EST. AVERAGE GLUCOSE BLD GHB EST-MCNC: 128 MG/DL (ref 68–126)
FASTING PATIENT LIPID ANSWER: YES
FASTING STATUS PATIENT QL REPORTED: YES
GLOBULIN PLAS-MCNC: 2.9 G/DL (ref 2–3.5)
GLUCOSE BLD-MCNC: 91 MG/DL (ref 70–99)
GLUCOSE UR-MCNC: NORMAL MG/DL
HBA1C MFR BLD: 6.1 % (ref ?–5.7)
HCT VFR BLD AUTO: 40.1 %
HDLC SERPL-MCNC: 45 MG/DL (ref 40–59)
HGB BLD-MCNC: 13.5 G/DL
HGB UR QL STRIP.AUTO: NEGATIVE
IMM GRANULOCYTES # BLD AUTO: 0.01 X10(3) UL (ref 0–1)
IMM GRANULOCYTES NFR BLD: 0.1 %
KETONES UR-MCNC: NEGATIVE MG/DL
LDLC SERPL CALC-MCNC: 46 MG/DL (ref ?–100)
LEUKOCYTE ESTERASE UR QL STRIP.AUTO: NEGATIVE
LYMPHOCYTES # BLD AUTO: 3.07 X10(3) UL (ref 1–4)
LYMPHOCYTES NFR BLD AUTO: 42.6 %
MCH RBC QN AUTO: 30.1 PG (ref 26–34)
MCHC RBC AUTO-ENTMCNC: 33.7 G/DL (ref 31–37)
MCV RBC AUTO: 89.5 FL
MONOCYTES # BLD AUTO: 0.53 X10(3) UL (ref 0.1–1)
MONOCYTES NFR BLD AUTO: 7.4 %
NEUTROPHILS # BLD AUTO: 3.21 X10 (3) UL (ref 1.5–7.7)
NEUTROPHILS # BLD AUTO: 3.21 X10(3) UL (ref 1.5–7.7)
NEUTROPHILS NFR BLD AUTO: 44.5 %
NITRITE UR QL STRIP.AUTO: NEGATIVE
NONHDLC SERPL-MCNC: 68 MG/DL (ref ?–130)
OSMOLALITY SERPL CALC.SUM OF ELEC: 292 MOSM/KG (ref 275–295)
PH UR: 5.5 [PH] (ref 5–8)
PLATELET # BLD AUTO: 386 10(3)UL (ref 150–450)
POTASSIUM SERPL-SCNC: 3.9 MMOL/L (ref 3.5–5.1)
PROT SERPL-MCNC: 7.3 G/DL (ref 5.7–8.2)
PROT UR-MCNC: NEGATIVE MG/DL
RBC # BLD AUTO: 4.48 X10(6)UL
SODIUM SERPL-SCNC: 142 MMOL/L (ref 136–145)
SP GR UR STRIP: 1.02 (ref 1–1.03)
TRIGL SERPL-MCNC: 123 MG/DL (ref 30–149)
UROBILINOGEN UR STRIP-ACNC: NORMAL
VLDLC SERPL CALC-MCNC: 17 MG/DL (ref 0–30)
WBC # BLD AUTO: 7.2 X10(3) UL (ref 4–11)

## 2024-07-26 PROCEDURE — 85652 RBC SED RATE AUTOMATED: CPT

## 2024-07-26 PROCEDURE — 80053 COMPREHEN METABOLIC PANEL: CPT

## 2024-07-26 PROCEDURE — 86140 C-REACTIVE PROTEIN: CPT

## 2024-07-26 PROCEDURE — 85025 COMPLETE CBC W/AUTO DIFF WBC: CPT

## 2024-07-26 PROCEDURE — 80061 LIPID PANEL: CPT

## 2024-07-26 PROCEDURE — 83036 HEMOGLOBIN GLYCOSYLATED A1C: CPT

## 2024-07-26 PROCEDURE — 81003 URINALYSIS AUTO W/O SCOPE: CPT

## 2024-07-26 PROCEDURE — 36415 COLL VENOUS BLD VENIPUNCTURE: CPT

## 2024-07-30 RX ORDER — DULAGLUTIDE 4.5 MG/.5ML
4.5 INJECTION, SOLUTION SUBCUTANEOUS WEEKLY
Qty: 6 ML | Refills: 1 | Status: SHIPPED | OUTPATIENT
Start: 2024-07-30

## 2024-08-01 ENCOUNTER — LAB ENCOUNTER (OUTPATIENT)
Dept: LAB | Facility: HOSPITAL | Age: 59
End: 2024-08-01
Attending: ORTHOPAEDIC SURGERY
Payer: MEDICAID

## 2024-08-01 DIAGNOSIS — Z01.818 PREOP TESTING: ICD-10-CM

## 2024-08-01 PROCEDURE — 87641 MR-STAPH DNA AMP PROBE: CPT

## 2024-08-02 LAB — MRSA DNA SPEC QL NAA+PROBE: NEGATIVE

## 2024-08-05 ENCOUNTER — OFFICE VISIT (OUTPATIENT)
Dept: UROLOGY | Facility: HOSPITAL | Age: 59
End: 2024-08-05
Attending: OBSTETRICS & GYNECOLOGY
Payer: MEDICAID

## 2024-08-05 VITALS — RESPIRATION RATE: 18 BRPM | BODY MASS INDEX: 33.17 KG/M2 | WEIGHT: 158 LBS | HEIGHT: 58 IN

## 2024-08-05 DIAGNOSIS — N39.41 URGE INCONTINENCE: Primary | ICD-10-CM

## 2024-08-05 LAB
CONTROL RUN WITHIN 24 HOURS?: YES
LEUKOCYTE ESTERASE URINE: NEGATIVE
NITRITE URINE: NEGATIVE

## 2024-08-05 PROCEDURE — 51797 INTRAABDOMINAL PRESSURE TEST: CPT

## 2024-08-05 PROCEDURE — 51784 ANAL/URINARY MUSCLE STUDY: CPT

## 2024-08-05 PROCEDURE — 51729 CYSTOMETROGRAM W/VP&UP: CPT

## 2024-08-05 PROCEDURE — 51741 ELECTRO-UROFLOWMETRY FIRST: CPT

## 2024-08-05 PROCEDURE — 81002 URINALYSIS NONAUTO W/O SCOPE: CPT

## 2024-08-05 NOTE — PROCEDURES
Patient here for urodynamic testing.  Procedure explained via language line solutions, ID#[id number], Yun, and confirmed by patient.  See evaluation form for results.  Both verbal and written discharge instructions were given.  Patient tolerated procedure well and will follow up with Dr. Caroline Chung for UDS follow up via telephone on 24 @ 12:30pm.    URODYNAMIC EVALUATION    PATIENT HISTORY:    Prolapse:  No  CHINTAN:  No  UUI:  Yes  Nocturia:  x6-11  Frequency:  less than 1 hour  Sense of Incomplete Emptying:  Yes (Reports sense of incomplete emptying at times. Reports shifting to void)  Constipation:  No  Last void prior to UDS testin.5 hours  Current urge to void?  None  OAB meds stopped prior to test?  NA  Other symptoms?      Surgery?  [x]  No      Surgical folder provided?  []  Yes  [x]  No     PATIENT DIAGNOSIS:  Urge Incontinence N39.41    UDS PROCEDURAL FINDINGS:  Cystocele, Midline N81.11, Rectocele, Midline R15.9, Vaginal Vault Prolapse N99.3, and Detrusor Instability N31.9    MEDICATION:   No outpatient medications have been marked as taking for the 24 encounter (Office Visit) with [facility] PROCEDURE.        ALLERGIES:  Patient has no known allergies.      EXAM:  Urinalysis Dip:  Today's Results   Component Date Value    control run 2024 Yes     Blood Urine 2024 Trace (A)     Nitrite Urine 2024 Negative     Leukocyte esterase urine 2024 Negative       Urovesico Junction ( >30 degrees ):  [x]  Mobile  []  Fixed    Perineal Sensation:  [x]  Normal  []  Abnormal    Additional Notes:    PROLAPSE:  [x]  Yes  []  No  Prolapse reduced for testing?  [x]  Yes  []  No  []  Pessary  []  Manual  [x]  Half Speculum    Additional Notes:    UROFLOWMETRY:  Unreduced  Voided Volume:                 257            mL  Maximum Flow Rate:                    21           mL/sec  Average flow rate:                 10           mL/sec  Post-void Residual:               15           mL  Pattern:   []  Normal  []  Poor flow     [x]  Intermittent []  Other  Void:   [x]  Typical  []  Atypical    Additional Notes: Initial uroflow 16mL. PVR 15mL. Upon completion of UDS testing patient retrofilled 200mL. Patient voided 257mL. PVR 15mL.    CYSTOMETRY:  Urethral Catheter:  Fr 7 / tdoc  Abd Catheter:     Fr 7 / tdoc   Infusion:  Water Rate 50 mL/min  Temp:  Room  Position:  [x]  Sit  []  Stand  []  Supine  First sensation:   66 mL  First desire to void:   144 mL  Strong desire to void:  234 mL  Maximum cystometric capacity:  272 mL  Detrusor Activity:  [x]  Unstable   []  Stable  Urge leakage?    []  Yes [x]  No  Volume at 1st unhibited detrusor cont:   117 mL  Detrusor instability provoked by:    []  Spontaneous []  Coughing  [x]  Filling  []  Valsalva  []  Other    Additional Notes:      URETHRAL FUNCTION:  Valsava (vesical) Leak Point Pressures:    Volume Leak Point Pressure Leak?    Cough Valsalva      100mL 182 108    cm H2O []  Yes [x]  No   150mL 213 92    cm H2O []  Yes [x]  No   200mL 226 106    cm H2O []  Yes [x]  No       REDUCED: 1/2 speculum  Volume Leak Point Pressure Leak?    Cough Valsalva      200mL 203 71    cm H2O []  Yes [x]  No   250mL 199 99    cm H2O []  Yes [x]  No     Genuine Stress Incontinence demonstrated?   []  Yes  [x]  No    Resting Urethral Pressure Profile:     Functional Urethral Length:         0.6 cm           0.5      cm     Maximum UCP:                  75 cm           80  cm       PRESSURE/FLOW STUDY:  Reduced  Voided volume:   310     mL  Maximum flow rate:   78   mL/sec  Pressure Detrusor (at maximum flow):       78  cm H2O  Post void residual:        1       mL  Voiding mechanism:  []  Abnormal  [x]  Normal  []  Strain to void   []  Weak detrusor  Void:   [x]  Typical   []  Atypical    Additional Notes:  Uroflowmetry, cystometry, and pressure / flow study were completed using calibrated electronic equipment and air charged transducers.    EMG:  During fill: Reactive    During  flow study: Reactive    8/5/2024 1:44 PM     PERFORMED BY:  Diane MOCTEZUMA RN

## 2024-08-05 NOTE — PATIENT INSTRUCTIONS
[facility] - [facility] UROGYNECOLOGY  [address]  [place] [address]  PH: [phone]  FAX: [phone]       Urodynamic Testing Discharge Instructions:    There are NO dietary or activity restrictions.  You may resume your normal schedule.      You may have mild discomfort for a few hours after your testing today.  There may be some mild burning when you urinate or you may see some blood in your urine.  These problems should not last more than 24 hours.  The following suggestions may minimize any symptoms you experience.    Drink 6-8 large glasses of water over the next 8 hours  A compress or sitz bath may be soothing  Tylenol or Ibuprofen may be taken as needed    If you experience any of the following, please call the office or, if after hours, the on-call physician at [phone].    Excessive pain  Bright red bloody discharge  Fever or chills  Continued urgency, frequency or burning with urination    Obtaining Test Results    Your urodynamic test will be interpreted by a specialist and available to the referring physician within 7-14 days.  Patients in our clinic are given an appointment to come back to discuss the results and any appropriate treatment recommendations.    Please do not hesitate to contact our office with any questions or concerns at [phone].    I acknowledge that I have received verbal and written discharge  instructions and that I understand these instructions clearly.    Patient Signature:    Date:    [facility]   [facility]    BOWEL REGIMEN    Constipation can have detrimental effects on bladder function and can worsen the symptoms of prolapse.  It is important to avoid constipation.    The first step for treating constipation is to increase the amount of fiber in your diet.  It is usually difficult to get enough fiber each day in the food we eat.  Therefore, the best way to increase fiber is to take a daily  fiber supplement.      _____Fiber Supplement  (Metamucil, Citrucel, Benefiber, etc)    Begin with 1 dose (as instructed on the package) every morning.    If the stool is too hard, or if the stool consists of small, hard, marbles, you may need to increase to 1 dose each morning and 1 dose each evening.    If you find it difficult to take the fiber as directed (i.e. mixed with water or juice), you can mix the fiber with your cereal or with applesauce.    Don’t worry if you have to increase the amount---fiber is not addictive and will not cause diarrhea.      _____Milk of Magnesia    Begin with 1 teaspoon every evening.  This is a very low dose---approximately one-sixth of the typical dose.  You may need to increase the amount depending on your results.      _____Miralax    Miralax is a laxative available over the counter.  It can be used on a long-term basis if necessary.  The usual starting dose is 1 capful of powder mixed in fluid each morning.  The dose can be adjusted up or down depending on results and consistency of stool.[facility]    HAVING A URINARY CATHETER AFTER SURGERY    What is a urinary catheter?  A catheter is a soft tube used to drain urine from your bladder.    Why do I need a catheter?  A urinary catheter is used to help with healing immediately after surgery.  It works to keep your bladder empty while you are recovering.  Surgery, swelling and anesthesia can cause the bladder to lose its normal sensations for a while.  The catheter may stay in place for a week or more after you go home.    Where will I go to get the catheter removed?  Your catheter will be removed in the office.  Please call the office to schedule a voiding trial with the nurse.    How will the catheter be removed?  A nurse will disconnect your catheter from the drainage bag.  She will attach a syringe to the catheter and fill the bladder with sterile water until you have a strong desire to  urinate.  The catheter will be removed and you will be able to go to the bathroom to empty your bladder.    Will the catheter need to be replaced?  You will need to urinate a specific amount, depending on how much you were initially able to hold.  Your catheter will only need to be replaced if you are not able to empty the specific amount.  If the catheter is replaced, your nurse will discuss with you when you need to return.    What do I do after the catheter is removed?  For the first 24 hours, you should go to the bathroom with your first urge or every 2-3 hours while you are awake.  Urinate before you go to bed and if you wake up in the night, you do not need to set an alarm to wake up.  Drink plenty of water (6-8 glasses) slowly throughout the day.  Avoid liquids containing caffeine.  Continue with any pain medications (Motrin) as directed by the nurse.  Continue with your current bowel regime; avoid constipation.    What if I have trouble emptying my bladder?  If you are having trouble emptying your bladder, try the following tips:  Urinate normally then stand up, walk around for a minute or two, sit back down on the commode and attempt to urinate (double void).  Sit in a tub of warm water and try to urinate in the tub.  Run warm water over your vaginal area while attempting to urinate.    When should I call the office?  If you are unable to urinate for 6 hours.  You feel you need to urinate but cannot.  Urinating frequently in small amounts.  You experience abdominal bloating, pressure or back pain.  You have a fever over 100.5 for 4 hours or above 101.0 anytime.  You have nausea and/or vomiting.  Burning/pain with urination.    Please feel free to call the nurse at [phone] with any questions 8am-4:30pm Monday-Friday.    If the office is closed, you may call the on call physician at [phone] or go to the emergency room.[facility]     Post-Operative  Guidelines    AVOID CONSTIPATION - Take Miralax: one capful in water or juice each morning.  You can also take each evening if needed.  No lifting over 10 lbs. (1 gallon of milk is 8 lbs.)  It is okay to walk up and down stairs and to walk outside, but be careful not to become fatigued.  Showers and tub baths are okay, even if you have a catheter or abdominal incision.  You may ride in a car immediately.  You may drive after 1-2 weeks.    Please call us for any of the following:  Temperature above 100.5 for 4 hours or above 101.0 at any time  Chest pain or trouble breathing  Vaginal bleeding heavier than a period  Redness, tenderness or swelling of your legs  Pain or burning when you urinate  Redness, pain or foul discharge from incision

## 2024-08-05 NOTE — PROGRESS NOTES
Patient here for urodynamic testing.  Procedure explained via language line solutions, ID#[id number], Yun, and confirmed by patient.  See evaluation form for results.  Both verbal and written discharge instructions were given.  Patient tolerated procedure well and will follow up with Dr. Caroline Chung for UDS follow up via telephone on 24 @ 12:30pm.    URODYNAMIC EVALUATION    PATIENT HISTORY:    Prolapse:  No  CHINTAN:  No  UUI:  Yes  Nocturia:  x6-11  Frequency:  less than 1 hour  Sense of Incomplete Emptying:  Yes (Reports sense of incomplete emptying at times. Reports shifting to void)  Constipation:  No  Last void prior to UDS testin.5 hours  Current urge to void?  None  OAB meds stopped prior to test?  NA  Other symptoms?      Surgery?  [x]  No      Surgical folder provided?  []  Yes  [x]  No     PATIENT DIAGNOSIS:  Urge Incontinence N39.41    UDS PROCEDURAL FINDINGS:  Cystocele, Midline N81.11, Rectocele, Midline R15.9, Vaginal Vault Prolapse N99.3, and Detrusor Instability N31.9    MEDICATION:   No outpatient medications have been marked as taking for the 24 encounter (Office Visit) with [facility] PROCEDURE.        ALLERGIES:  Patient has no known allergies.      EXAM:  Urinalysis Dip:  Today's Results   Component Date Value    control run 2024 Yes     Blood Urine 2024 Trace (A)     Nitrite Urine 2024 Negative     Leukocyte esterase urine 2024 Negative       Uroves[place] ( >30 degrees ):  [x]  Mobile  []  Fixed    Perineal Sensation:  [x]  Normal  []  Abnormal    Additional Notes:    PROLAPSE:  [x]  Yes  []  No  Prolapse reduced for testing?  [x]  Yes  []  No  []  Pessary  []  Manual  [x]  Half Speculum    Additional Notes:    UROFLOWMETRY:  Unreduced  Voided Volume:                 257            mL  Maximum Flow Rate:                    21           mL/sec  Average flow rate:                 10           mL/sec  Post-void Residual:               15           mL  Pattern:   []  Normal  []  Poor flow     [x]  Intermittent []  Other  Void:   [x]  Typical  []  Atypical    Additional Notes: Initial uroflow 16mL. PVR 15mL. Upon completion of UDS testing patient retrofilled 200mL. Patient voided 257mL. PVR 15mL.    CYSTOMETRY:  Urethral Catheter:  Fr 7 / tdoc  Abd Catheter:     Fr 7 / tdoc   Infusion:  Water Rate 50 mL/min  Temp:  Room  Position:  [x]  Sit  []  Stand  []  Supine  First sensation:   66 mL  First desire to void:   144 mL  Strong desire to void:  234 mL  Maximum cystometric capacity:  272 mL  Detrusor Activity:  [x]  Unstable   []  Stable  Urge leakage?    []  Yes [x]  No  Volume at 1st unhibited detrusor cont:   117 mL  Detrusor instability provoked by:    []  Spontaneous []  Coughing  [x]  Filling  []  Valsalva  []  Other    Additional Notes:      URETHRAL FUNCTION:  Valsava (vesical) Leak Point Pressures:    Volume Leak Point Pressure Leak?    Cough Valsalva      100mL 182 108    cm H2O []  Yes [x]  No   150mL 213 92    cm H2O []  Yes [x]  No   200mL 226 106    cm H2O []  Yes [x]  No       REDUCED: 1/2 speculum  Volume Leak Point Pressure Leak?    Cough Valsalva      200mL 203 71    cm H2O []  Yes [x]  No   250mL 199 99    cm H2O []  Yes [x]  No     Genuine Stress Incontinence demonstrated?   []  Yes  [x]  No    Resting Urethral Pressure Profile:     Functional Urethral Length:         0.6 cm           0.5      cm     Maximum UCP:                  75 cm           80  cm       PRESSURE/FLOW STUDY:  Reduced  Voided volume:   310     mL  Maximum flow rate:   78   mL/sec  Pressure Detrusor (at maximum flow):       78  cm H2O  Post void residual:        1       mL  Voiding mechanism:  []  Abnormal  [x]  Normal  []  Strain to void   []  Weak detrusor  Void:   [x]  Typical   []  Atypical    Additional Notes:  Uroflowmetry, cystometry, and pressure / flow study were completed using calibrated electronic equipment and air charged transducers.    EMG:  During fill: Reactive    During  flow study: Reactive    8/5/2024 1:44 PM     PERFORMED BY:  Diane MOCTEZUMA RN

## 2024-08-07 NOTE — H&P
Dustin Mclean MD   Physician  Specialty: SURGERY, ORTHOPEDIC     Progress Notes     Signed        Signed       Expand All Collapse All    NURSING INTAKE COMMENTS:        Chief Complaint   Patient presents with    Knee Pain       Language Line used Felisa ID# [id number]. Patient is here to discuss surgery for right knee. Patient rates her pain 6/10 at this time.          HPI: This 59 year old female presents today with language line for translation.  Replaced her left knee in November 2023 and she is doing well.  She has some pains but overall much better than before surgery.  Her motion is excellent.  She now wants the right knee replaced.  She has bone-on-bone arthritis on x-rays today.     She remains in pretty good shape physically.  Medical and social history unchanged since our last visit.  She is diabetic.     We prescribed physical therapy for some lumbar pain and it has improved.     Past Medical History       Past Medical History:    Diabetes (HCC)    Diabetes mellitus (HCC)    Esophageal reflux    Migraines    Osteoarthritis     RIGHT ARM PAIN, left knee         Past Surgical History         Past Surgical History:   Procedure Laterality Date    Cholecystectomy        Cystoscopy,insert ureteral stent         bladder sling 7 years ago    Hysterectomy        Tubal ligation             Current Medications          Current Outpatient Medications   Medication Sig Dispense Refill    atorvastatin 10 MG Oral Tab Take 1 tablet (10 mg total) by mouth nightly. TAKE AT BEDTIME        Dulaglutide (TRULICITY) 4.5 MG/0.5ML Subcutaneous Solution Pen-injector Inject 4.5 mg into the skin once a week. 6 mL 0    Omega-3 Fatty Acids (OMEGA-3 FISH OIL OR) Take 2 tablets by mouth daily.        gabapentin 300 MG Oral Cap Take 1 capsule (300 mg total) by mouth in the morning, at noon, and at bedtime. 270 capsule 1    rosuvastatin 10 MG Oral Tab Take 1 tablet (10 mg total) by mouth nightly. 90 tablet 0    metFORMIN HCl 1000 MG  Oral Tab Take 1 tablet (1,000 mg total) by mouth daily with breakfast AND 0.5 tablets (500 mg total) daily with dinner.        HYDROcodone-acetaminophen 7.5-325 MG Oral Tab Take 1 tablet by mouth every 6 (six) hours as needed for Pain. Maximum dose of acetaminophen is 4000 mg from all sources in 24 hours. 25 tablet 0    aspirin 325 MG Oral Tab EC Take 1 tablet (325 mg total) by mouth in the morning and 1 tablet (325 mg total) before bedtime. Do not crush.  After 30 days, resume once daily aspirin 81 mg as you were doing before surgery.. 60 tablet 0    acetaminophen 325 MG Oral Tab Take 2 tablets (650 mg total) by mouth every 8 (eight) hours as needed for Pain. No more than 4000 mg acetaminophen/Tylenol per day from all sources.  Recall that each Norco has 325 mg of Tylenol in it. 60 tablet 0    calcium carbonate-vitamin D 250-3.125 MG-MCG Oral Tab Take 1 tablet by mouth 2 (two) times daily with meals. 60 tablet 0    docusate sodium 100 MG Oral Cap Take 100 mg by mouth 2 (two) times daily. Do not crush.  Stop if loose stool. 20 capsule 0    multivitamin Oral Chew Tab Chew 1 tablet by mouth daily. 60 tablet 0    ONETOUCH ULTRA In Vitro Strip TEST TWICE DAILY 200 strip 1         Allergies   No Known Allergies     Family History   History reviewed. No pertinent family history.     No family Hx of DVT/PE     Social History            Occupational History    Not on file   Tobacco Use    Smoking status: Never       Passive exposure: Never    Smokeless tobacco: Never   Vaping Use    Vaping status: Never Used   Substance and Sexual Activity    Alcohol use: Not Currently    Drug use: Never    Sexual activity: Yes         Review of Systems:  GENERAL: feels generally well, no significant weight loss or weight gain  SKIN: no ulcerated or worrisome skin lesions  EYES:denies blurred vision or double vision  HEENT: denies new nasal congestion, sinus pain or ST  LUNGS: denies shortness of breath  CARDIOVASCULAR: denies chest  pain  GI: no hematemesis, no worsening heartburn, no diarrhea  : no dysuria, no blood in urine, no difficulty urinating, no incontinence  MUSCULOSKELETAL: no other musculoskeletal complaints other than in HPI  NEURO: no numbness or tingling, no weakness or balance disorder  PSYCHE: no depression or anxiety  HEMATOLOGIC: no hx of blood dyscrasia, no Hx DVT/PE  ENDOCRINE: no thyroid or diabetes issues  ALL/ASTHMA: no new hx of severe allergy or asthma     Physical Examination:    There were no vitals taken for this visit.  Constitutional: appears well hydrated, alert and responsive, no acute distress noted  Extremities: Left knee replacement incision healed well cosmetically.  Neither knee had asymmetric warmth or effusion.  Skin on both legs healthy without pitting edema or calf tenderness.  Musculoskeletal: Both knees had excellent motion from 0 to 130 degrees.  No instability.  Good patella tracking on the left knee.  Mostly patellofemoral tenderness and some crepitus for the right knee as well as medial joint line tenderness.  Left knee replacement with no medial or lateral tenderness.  Mild posterior tenderness.  Neurological: Normal motor and sensory bilateral lower extremities.     Imaging: X-rays show the left knee replacement well-fixed and in proper alignment.  The right knee has bone-on-bone medial osteoarthritis.        No results found.            Lab Results   Component Value Date     WBC 7.5 04/03/2024     HGB 14.4 04/03/2024     .0 04/03/2024            Lab Results   Component Value Date     GLU 94 04/03/2024     BUN 7 (L) 04/03/2024     CREATSERUM 0.48 (L) 04/03/2024         Assessment and Plan:  Diagnoses and all orders for this visit:     Primary osteoarthritis of right knee  -     XR KNEE (3 VIEWS), RIGHT (CPT=73562); Future  -     MRI KNEE PUNEET, RIGHT (CPT=73721); Future     Class 1 obesity due to excess calories with serious comorbidity and body mass index (BMI) of 33.0 to 33.9 in  adult           Assessment: She wishes to replace the right knee.     Plan: We discussed risks of surgery such as death, heart attack, stroke, bleeding, infection, blood clot, nerve injury artery injury, fracture, stiffness, loosening, revision surgery, numbness lateral to incision, and persistent pain despite surgery.  She has questions which were answered to her satisfaction.  She wishes to proceed.     She will need MRI templating, medical and dental clearance.  The plan will be a Ramírez persona LPS.     Follow Up: No follow-ups on file.     Dustin Mclean MD

## 2024-08-08 ENCOUNTER — HOSPITAL ENCOUNTER (OUTPATIENT)
Facility: HOSPITAL | Age: 59
Discharge: HOME HEALTH | End: 2024-08-10
Attending: ORTHOPAEDIC SURGERY | Admitting: ORTHOPAEDIC SURGERY
Payer: MEDICAID

## 2024-08-08 ENCOUNTER — APPOINTMENT (OUTPATIENT)
Dept: GENERAL RADIOLOGY | Facility: HOSPITAL | Age: 59
End: 2024-08-08
Attending: STUDENT IN AN ORGANIZED HEALTH CARE EDUCATION/TRAINING PROGRAM
Payer: MEDICAID

## 2024-08-08 ENCOUNTER — APPOINTMENT (OUTPATIENT)
Dept: GENERAL RADIOLOGY | Facility: HOSPITAL | Age: 59
End: 2024-08-08
Attending: ORTHOPAEDIC SURGERY
Payer: MEDICAID

## 2024-08-08 ENCOUNTER — ANESTHESIA (OUTPATIENT)
Dept: SURGERY | Facility: HOSPITAL | Age: 59
End: 2024-08-08
Payer: MEDICAID

## 2024-08-08 ENCOUNTER — ANESTHESIA EVENT (OUTPATIENT)
Dept: SURGERY | Facility: HOSPITAL | Age: 59
End: 2024-08-08
Payer: MEDICAID

## 2024-08-08 DIAGNOSIS — M17.11 PRIMARY OSTEOARTHRITIS OF RIGHT KNEE: Primary | ICD-10-CM

## 2024-08-08 DIAGNOSIS — Z01.818 PREOP TESTING: ICD-10-CM

## 2024-08-08 DIAGNOSIS — E66.09 CLASS 1 OBESITY DUE TO EXCESS CALORIES WITH SERIOUS COMORBIDITY AND BODY MASS INDEX (BMI) OF 33.0 TO 33.9 IN ADULT: ICD-10-CM

## 2024-08-08 DIAGNOSIS — Z96.651 STATUS POST TOTAL RIGHT KNEE REPLACEMENT: ICD-10-CM

## 2024-08-08 PROBLEM — E78.5 HYPERLIPIDEMIA: Status: ACTIVE | Noted: 2024-08-08

## 2024-08-08 LAB
GLUCOSE BLDC GLUCOMTR-MCNC: 133 MG/DL (ref 70–99)
GLUCOSE BLDC GLUCOMTR-MCNC: 150 MG/DL (ref 70–99)
GLUCOSE BLDC GLUCOMTR-MCNC: 90 MG/DL (ref 70–99)

## 2024-08-08 PROCEDURE — 73560 X-RAY EXAM OF KNEE 1 OR 2: CPT | Performed by: ORTHOPAEDIC SURGERY

## 2024-08-08 PROCEDURE — 0SRC0J9 REPLACEMENT OF RIGHT KNEE JOINT WITH SYNTHETIC SUBSTITUTE, CEMENTED, OPEN APPROACH: ICD-10-PCS | Performed by: ORTHOPAEDIC SURGERY

## 2024-08-08 PROCEDURE — 99214 OFFICE O/P EST MOD 30 MIN: CPT | Performed by: HOSPITALIST

## 2024-08-08 PROCEDURE — 71045 X-RAY EXAM CHEST 1 VIEW: CPT | Performed by: STUDENT IN AN ORGANIZED HEALTH CARE EDUCATION/TRAINING PROGRAM

## 2024-08-08 DEVICE — IMPLANTABLE DEVICE
Type: IMPLANTABLE DEVICE | Site: KNEE | Status: FUNCTIONAL
Brand: PERSONA®

## 2024-08-08 DEVICE — IMPLANTABLE DEVICE
Type: IMPLANTABLE DEVICE | Site: KNEE | Status: FUNCTIONAL
Brand: PERSONA® VIVACIT-E®

## 2024-08-08 DEVICE — IMPLANTABLE DEVICE
Type: IMPLANTABLE DEVICE | Site: KNEE | Status: FUNCTIONAL
Brand: REFOBACIN® BONE CEMENT R

## 2024-08-08 DEVICE — IMPLANTABLE DEVICE
Type: IMPLANTABLE DEVICE | Site: KNEE | Status: FUNCTIONAL
Brand: PERSONA® NATURAL TIBIA®

## 2024-08-08 RX ORDER — ENEMA 19; 7 G/133ML; G/133ML
1 ENEMA RECTAL ONCE AS NEEDED
Status: DISCONTINUED | OUTPATIENT
Start: 2024-08-08 | End: 2024-08-10

## 2024-08-08 RX ORDER — ACETAMINOPHEN 325 MG/1
650 TABLET ORAL EVERY 6 HOURS PRN
Status: ACTIVE | OUTPATIENT
Start: 2024-08-08 | End: 2024-08-09

## 2024-08-08 RX ORDER — HYDROCODONE BITARTRATE AND ACETAMINOPHEN 7.5; 325 MG/1; MG/1
1 TABLET ORAL EVERY 6 HOURS PRN
Status: DISPENSED | OUTPATIENT
Start: 2024-08-08 | End: 2024-08-09

## 2024-08-08 RX ORDER — PROCHLORPERAZINE EDISYLATE 5 MG/ML
5 INJECTION INTRAMUSCULAR; INTRAVENOUS ONCE AS NEEDED
Status: ACTIVE | OUTPATIENT
Start: 2024-08-08 | End: 2024-08-08

## 2024-08-08 RX ORDER — ACETAMINOPHEN 325 MG/1
650 TABLET ORAL EVERY 8 HOURS PRN
Status: DISCONTINUED | OUTPATIENT
Start: 2024-08-08 | End: 2024-08-10

## 2024-08-08 RX ORDER — IPRATROPIUM BROMIDE AND ALBUTEROL SULFATE 2.5; .5 MG/3ML; MG/3ML
3 SOLUTION RESPIRATORY (INHALATION) EVERY 6 HOURS PRN
Status: DISCONTINUED | OUTPATIENT
Start: 2024-08-08 | End: 2024-08-10

## 2024-08-08 RX ORDER — SODIUM CHLORIDE, SODIUM LACTATE, POTASSIUM CHLORIDE, CALCIUM CHLORIDE 600; 310; 30; 20 MG/100ML; MG/100ML; MG/100ML; MG/100ML
INJECTION, SOLUTION INTRAVENOUS CONTINUOUS
Status: DISCONTINUED | OUTPATIENT
Start: 2024-08-08 | End: 2024-08-08

## 2024-08-08 RX ORDER — BISACODYL 10 MG
10 SUPPOSITORY, RECTAL RECTAL
Status: DISCONTINUED | OUTPATIENT
Start: 2024-08-08 | End: 2024-08-10

## 2024-08-08 RX ORDER — HYDROMORPHONE HYDROCHLORIDE 1 MG/ML
0.6 INJECTION, SOLUTION INTRAMUSCULAR; INTRAVENOUS; SUBCUTANEOUS EVERY 5 MIN PRN
Status: DISCONTINUED | OUTPATIENT
Start: 2024-08-08 | End: 2024-08-08 | Stop reason: HOSPADM

## 2024-08-08 RX ORDER — GABAPENTIN 300 MG/1
300 CAPSULE ORAL 3 TIMES DAILY
Status: DISCONTINUED | OUTPATIENT
Start: 2024-08-08 | End: 2024-08-10

## 2024-08-08 RX ORDER — ONDANSETRON 2 MG/ML
4 INJECTION INTRAMUSCULAR; INTRAVENOUS ONCE AS NEEDED
Status: ACTIVE | OUTPATIENT
Start: 2024-08-08 | End: 2024-08-08

## 2024-08-08 RX ORDER — NALOXONE HYDROCHLORIDE 0.4 MG/ML
0.08 INJECTION, SOLUTION INTRAMUSCULAR; INTRAVENOUS; SUBCUTANEOUS
Status: ACTIVE | OUTPATIENT
Start: 2024-08-08 | End: 2024-08-09

## 2024-08-08 RX ORDER — BUPIVACAINE HYDROCHLORIDE 7.5 MG/ML
INJECTION, SOLUTION INTRASPINAL AS NEEDED
Status: DISCONTINUED | OUTPATIENT
Start: 2024-08-08 | End: 2024-08-08 | Stop reason: SURG

## 2024-08-08 RX ORDER — HALOPERIDOL 5 MG/ML
0.5 INJECTION INTRAMUSCULAR ONCE AS NEEDED
Status: ACTIVE | OUTPATIENT
Start: 2024-08-08 | End: 2024-08-08

## 2024-08-08 RX ORDER — DEXAMETHASONE SODIUM PHOSPHATE 4 MG/ML
VIAL (ML) INJECTION AS NEEDED
Status: DISCONTINUED | OUTPATIENT
Start: 2024-08-08 | End: 2024-08-08 | Stop reason: SURG

## 2024-08-08 RX ORDER — ACETAMINOPHEN 500 MG
1000 TABLET ORAL ONCE AS NEEDED
Status: DISCONTINUED | OUTPATIENT
Start: 2024-08-08 | End: 2024-08-08 | Stop reason: HOSPADM

## 2024-08-08 RX ORDER — DIPHENHYDRAMINE HCL 25 MG
25 CAPSULE ORAL EVERY 4 HOURS PRN
Status: ACTIVE | OUTPATIENT
Start: 2024-08-08 | End: 2024-08-09

## 2024-08-08 RX ORDER — NALOXONE HYDROCHLORIDE 0.4 MG/ML
80 INJECTION, SOLUTION INTRAMUSCULAR; INTRAVENOUS; SUBCUTANEOUS AS NEEDED
Status: DISCONTINUED | OUTPATIENT
Start: 2024-08-08 | End: 2024-08-08 | Stop reason: HOSPADM

## 2024-08-08 RX ORDER — MORPHINE SULFATE 10 MG/ML
6 INJECTION, SOLUTION INTRAMUSCULAR; INTRAVENOUS EVERY 10 MIN PRN
Status: DISCONTINUED | OUTPATIENT
Start: 2024-08-08 | End: 2024-08-08 | Stop reason: HOSPADM

## 2024-08-08 RX ORDER — DEXTROSE MONOHYDRATE 25 G/50ML
50 INJECTION, SOLUTION INTRAVENOUS
Status: DISCONTINUED | OUTPATIENT
Start: 2024-08-08 | End: 2024-08-08 | Stop reason: HOSPADM

## 2024-08-08 RX ORDER — HYDROMORPHONE HYDROCHLORIDE 1 MG/ML
0.6 INJECTION, SOLUTION INTRAMUSCULAR; INTRAVENOUS; SUBCUTANEOUS
Status: ACTIVE | OUTPATIENT
Start: 2024-08-08 | End: 2024-08-09

## 2024-08-08 RX ORDER — MULTIPLE VITAMINS W/ MINERALS TAB 9MG-400MCG
1 TAB ORAL DAILY
Status: DISCONTINUED | OUTPATIENT
Start: 2024-08-08 | End: 2024-08-10

## 2024-08-08 RX ORDER — MORPHINE SULFATE 4 MG/ML
4 INJECTION, SOLUTION INTRAMUSCULAR; INTRAVENOUS EVERY 10 MIN PRN
Status: DISCONTINUED | OUTPATIENT
Start: 2024-08-08 | End: 2024-08-08 | Stop reason: HOSPADM

## 2024-08-08 RX ORDER — NAPROXEN 250 MG/1
250 TABLET ORAL 2 TIMES DAILY WITH MEALS
Status: DISCONTINUED | OUTPATIENT
Start: 2024-08-08 | End: 2024-08-10

## 2024-08-08 RX ORDER — NICOTINE POLACRILEX 4 MG
15 LOZENGE BUCCAL
Status: DISCONTINUED | OUTPATIENT
Start: 2024-08-08 | End: 2024-08-08 | Stop reason: HOSPADM

## 2024-08-08 RX ORDER — ROSUVASTATIN CALCIUM 10 MG/1
10 TABLET, COATED ORAL NIGHTLY
Status: DISCONTINUED | OUTPATIENT
Start: 2024-08-08 | End: 2024-08-10

## 2024-08-08 RX ORDER — NICOTINE POLACRILEX 4 MG
15 LOZENGE BUCCAL
Status: DISCONTINUED | OUTPATIENT
Start: 2024-08-08 | End: 2024-08-10

## 2024-08-08 RX ORDER — ACETAMINOPHEN 500 MG
1000 TABLET ORAL ONCE
Status: COMPLETED | OUTPATIENT
Start: 2024-08-08 | End: 2024-08-08

## 2024-08-08 RX ORDER — HYDROMORPHONE HYDROCHLORIDE 1 MG/ML
0.4 INJECTION, SOLUTION INTRAMUSCULAR; INTRAVENOUS; SUBCUTANEOUS EVERY 5 MIN PRN
Status: DISCONTINUED | OUTPATIENT
Start: 2024-08-08 | End: 2024-08-08 | Stop reason: HOSPADM

## 2024-08-08 RX ORDER — DIPHENHYDRAMINE HYDROCHLORIDE 50 MG/ML
12.5 INJECTION INTRAMUSCULAR; INTRAVENOUS EVERY 4 HOURS PRN
Status: DISCONTINUED | OUTPATIENT
Start: 2024-08-08 | End: 2024-08-10

## 2024-08-08 RX ORDER — PROCHLORPERAZINE EDISYLATE 5 MG/ML
5 INJECTION INTRAMUSCULAR; INTRAVENOUS EVERY 8 HOURS PRN
Status: DISCONTINUED | OUTPATIENT
Start: 2024-08-08 | End: 2024-08-08 | Stop reason: HOSPADM

## 2024-08-08 RX ORDER — FAMOTIDINE 20 MG/1
20 TABLET, FILM COATED ORAL ONCE
Status: COMPLETED | OUTPATIENT
Start: 2024-08-08 | End: 2024-08-08

## 2024-08-08 RX ORDER — SODIUM CHLORIDE, SODIUM LACTATE, POTASSIUM CHLORIDE, CALCIUM CHLORIDE 600; 310; 30; 20 MG/100ML; MG/100ML; MG/100ML; MG/100ML
INJECTION, SOLUTION INTRAVENOUS CONTINUOUS
Status: DISCONTINUED | OUTPATIENT
Start: 2024-08-08 | End: 2024-08-08 | Stop reason: HOSPADM

## 2024-08-08 RX ORDER — ONDANSETRON 2 MG/ML
INJECTION INTRAMUSCULAR; INTRAVENOUS AS NEEDED
Status: DISCONTINUED | OUTPATIENT
Start: 2024-08-08 | End: 2024-08-08 | Stop reason: SURG

## 2024-08-08 RX ORDER — DOCUSATE SODIUM 100 MG/1
100 CAPSULE, LIQUID FILLED ORAL 2 TIMES DAILY
Status: DISCONTINUED | OUTPATIENT
Start: 2024-08-08 | End: 2024-08-10

## 2024-08-08 RX ORDER — ONDANSETRON 2 MG/ML
4 INJECTION INTRAMUSCULAR; INTRAVENOUS EVERY 6 HOURS PRN
Status: DISCONTINUED | OUTPATIENT
Start: 2024-08-08 | End: 2024-08-08 | Stop reason: HOSPADM

## 2024-08-08 RX ORDER — ONDANSETRON 2 MG/ML
4 INJECTION INTRAMUSCULAR; INTRAVENOUS EVERY 6 HOURS PRN
Status: DISCONTINUED | OUTPATIENT
Start: 2024-08-08 | End: 2024-08-10

## 2024-08-08 RX ORDER — HYDROCODONE BITARTRATE AND ACETAMINOPHEN 7.5; 325 MG/1; MG/1
2 TABLET ORAL EVERY 6 HOURS PRN
Status: DISPENSED | OUTPATIENT
Start: 2024-08-08 | End: 2024-08-09

## 2024-08-08 RX ORDER — MORPHINE SULFATE 4 MG/ML
2 INJECTION, SOLUTION INTRAMUSCULAR; INTRAVENOUS EVERY 10 MIN PRN
Status: DISCONTINUED | OUTPATIENT
Start: 2024-08-08 | End: 2024-08-08 | Stop reason: HOSPADM

## 2024-08-08 RX ORDER — DIPHENHYDRAMINE HYDROCHLORIDE 50 MG/ML
25 INJECTION INTRAMUSCULAR; INTRAVENOUS ONCE AS NEEDED
Status: ACTIVE | OUTPATIENT
Start: 2024-08-08 | End: 2024-08-08

## 2024-08-08 RX ORDER — TRANEXAMIC ACID 10 MG/ML
INJECTION, SOLUTION INTRAVENOUS AS NEEDED
Status: DISCONTINUED | OUTPATIENT
Start: 2024-08-08 | End: 2024-08-08 | Stop reason: SURG

## 2024-08-08 RX ORDER — DIPHENHYDRAMINE HYDROCHLORIDE 50 MG/ML
12.5 INJECTION INTRAMUSCULAR; INTRAVENOUS EVERY 4 HOURS PRN
Status: ACTIVE | OUTPATIENT
Start: 2024-08-08 | End: 2024-08-09

## 2024-08-08 RX ORDER — LIDOCAINE HYDROCHLORIDE 10 MG/ML
INJECTION, SOLUTION EPIDURAL; INFILTRATION; INTRACAUDAL; PERINEURAL AS NEEDED
Status: DISCONTINUED | OUTPATIENT
Start: 2024-08-08 | End: 2024-08-08 | Stop reason: SURG

## 2024-08-08 RX ORDER — PROCHLORPERAZINE EDISYLATE 5 MG/ML
5 INJECTION INTRAMUSCULAR; INTRAVENOUS EVERY 8 HOURS PRN
Status: DISCONTINUED | OUTPATIENT
Start: 2024-08-08 | End: 2024-08-10

## 2024-08-08 RX ORDER — HYDROMORPHONE HYDROCHLORIDE 1 MG/ML
0.2 INJECTION, SOLUTION INTRAMUSCULAR; INTRAVENOUS; SUBCUTANEOUS EVERY 5 MIN PRN
Status: DISCONTINUED | OUTPATIENT
Start: 2024-08-08 | End: 2024-08-08 | Stop reason: HOSPADM

## 2024-08-08 RX ORDER — FAMOTIDINE 10 MG/ML
20 INJECTION, SOLUTION INTRAVENOUS ONCE
Status: COMPLETED | OUTPATIENT
Start: 2024-08-08 | End: 2024-08-08

## 2024-08-08 RX ORDER — HYDROCODONE BITARTRATE AND ACETAMINOPHEN 10; 325 MG/1; MG/1
1 TABLET ORAL EVERY 4 HOURS PRN
Status: DISCONTINUED | OUTPATIENT
Start: 2024-08-08 | End: 2024-08-10

## 2024-08-08 RX ORDER — NICOTINE POLACRILEX 4 MG
30 LOZENGE BUCCAL
Status: DISCONTINUED | OUTPATIENT
Start: 2024-08-08 | End: 2024-08-10

## 2024-08-08 RX ORDER — POLYETHYLENE GLYCOL 3350 17 G/17G
17 POWDER, FOR SOLUTION ORAL DAILY PRN
Status: DISCONTINUED | OUTPATIENT
Start: 2024-08-08 | End: 2024-08-10

## 2024-08-08 RX ORDER — NALBUPHINE HYDROCHLORIDE 10 MG/ML
2.5 INJECTION, SOLUTION INTRAMUSCULAR; INTRAVENOUS; SUBCUTANEOUS EVERY 4 HOURS PRN
Status: ACTIVE | OUTPATIENT
Start: 2024-08-08 | End: 2024-08-09

## 2024-08-08 RX ORDER — DIPHENHYDRAMINE HCL 25 MG
25 CAPSULE ORAL EVERY 4 HOURS PRN
Status: DISCONTINUED | OUTPATIENT
Start: 2024-08-08 | End: 2024-08-10

## 2024-08-08 RX ORDER — METOCLOPRAMIDE HYDROCHLORIDE 5 MG/ML
10 INJECTION INTRAMUSCULAR; INTRAVENOUS ONCE
Status: COMPLETED | OUTPATIENT
Start: 2024-08-08 | End: 2024-08-08

## 2024-08-08 RX ORDER — METOCLOPRAMIDE 10 MG/1
10 TABLET ORAL ONCE
Status: COMPLETED | OUTPATIENT
Start: 2024-08-08 | End: 2024-08-08

## 2024-08-08 RX ORDER — SODIUM CHLORIDE 9 MG/ML
INJECTION, SOLUTION INTRAVENOUS CONTINUOUS
Status: DISCONTINUED | OUTPATIENT
Start: 2024-08-08 | End: 2024-08-10

## 2024-08-08 RX ORDER — SENNOSIDES 8.6 MG
17.2 TABLET ORAL NIGHTLY
Status: DISCONTINUED | OUTPATIENT
Start: 2024-08-08 | End: 2024-08-10

## 2024-08-08 RX ORDER — NICOTINE POLACRILEX 4 MG
30 LOZENGE BUCCAL
Status: DISCONTINUED | OUTPATIENT
Start: 2024-08-08 | End: 2024-08-08 | Stop reason: HOSPADM

## 2024-08-08 RX ORDER — DEXTROSE MONOHYDRATE 25 G/50ML
50 INJECTION, SOLUTION INTRAVENOUS
Status: DISCONTINUED | OUTPATIENT
Start: 2024-08-08 | End: 2024-08-10

## 2024-08-08 RX ORDER — ENOXAPARIN SODIUM 100 MG/ML
30 INJECTION SUBCUTANEOUS ONCE
Status: COMPLETED | OUTPATIENT
Start: 2024-08-08 | End: 2024-08-08

## 2024-08-08 RX ORDER — ASPIRIN 325 MG
325 TABLET, DELAYED RELEASE (ENTERIC COATED) ORAL 2 TIMES DAILY
Status: DISCONTINUED | OUTPATIENT
Start: 2024-08-08 | End: 2024-08-10

## 2024-08-08 RX ORDER — HYDROMORPHONE HYDROCHLORIDE 1 MG/ML
0.4 INJECTION, SOLUTION INTRAMUSCULAR; INTRAVENOUS; SUBCUTANEOUS
Status: ACTIVE | OUTPATIENT
Start: 2024-08-08 | End: 2024-08-09

## 2024-08-08 RX ORDER — CALCIUM CARBONATE-CHOLECALCIFEROL TAB 250 MG-125 UNIT 250-125 MG-UNIT
1 TAB ORAL 2 TIMES DAILY WITH MEALS
Status: DISCONTINUED | OUTPATIENT
Start: 2024-08-08 | End: 2024-08-10

## 2024-08-08 RX ORDER — MORPHINE SULFATE 1 MG/ML
INJECTION, SOLUTION EPIDURAL; INTRATHECAL; INTRAVENOUS AS NEEDED
Status: DISCONTINUED | OUTPATIENT
Start: 2024-08-08 | End: 2024-08-08 | Stop reason: SURG

## 2024-08-08 RX ADMIN — TRANEXAMIC ACID 1000 MG: 10 INJECTION, SOLUTION INTRAVENOUS at 17:54:00

## 2024-08-08 RX ADMIN — MORPHINE SULFATE 0.2 MG: 1 INJECTION, SOLUTION EPIDURAL; INTRATHECAL; INTRAVENOUS at 16:30:00

## 2024-08-08 RX ADMIN — ONDANSETRON 4 MG: 2 INJECTION INTRAMUSCULAR; INTRAVENOUS at 16:20:00

## 2024-08-08 RX ADMIN — DEXAMETHASONE SODIUM PHOSPHATE 4 MG: 4 MG/ML VIAL (ML) INJECTION at 16:20:00

## 2024-08-08 RX ADMIN — BUPIVACAINE HYDROCHLORIDE 1.3 ML: 7.5 INJECTION, SOLUTION INTRASPINAL at 16:30:00

## 2024-08-08 RX ADMIN — TRANEXAMIC ACID 1000 MG: 10 INJECTION, SOLUTION INTRAVENOUS at 16:36:00

## 2024-08-08 RX ADMIN — LIDOCAINE HYDROCHLORIDE 50 MG: 10 INJECTION, SOLUTION EPIDURAL; INFILTRATION; INTRACAUDAL; PERINEURAL at 16:29:00

## 2024-08-08 NOTE — CM/SW NOTE
Addendum 11:21:   Reservation made to only accepting HH provider via [facility].    [facility], [facility]  [address], [address]  [place] [address]  Phone: [phone]  Fax: [phone]    Pt AVS updated to reflect above.    MDO to SW for surgical/post surgical assessment per protocol.  Awaiting PT recommendations for discharge,  SW uploaded tentative HH referral/F2F via [employer].    Will require agency acceptance, pt choice.    SW/CM to remain available for support and/or discharge planning.     DUKE Pedro, LSW  Social Work   Ext:#[phone]

## 2024-08-08 NOTE — INTERVAL H&P NOTE
Pre-op Diagnosis: Primary osteoarthritis of right knee [M17.11]  Class 1 obesity due to excess calories with serious comorbidity and body mass index (BMI) of 33.0 to 33.9 in adult [E66.09, Z68.33]    The above referenced H&P was reviewed by Dustin Mclean MD on 8/8/2024, the patient was examined and no significant changes have occurred in the patient's condition since the H&P was performed.  I discussed with the patient and/or legal representative the potential benefits, risks and side effects of this procedure; the likelihood of the patient achieving goals; and potential problems that might occur during recuperation.  I discussed reasonable alternatives to the procedure, including risks, benefits and side effects related to the alternatives and risks related to not receiving this procedure.  We will proceed with procedure as planned.

## 2024-08-08 NOTE — ANESTHESIA POSTPROCEDURE EVALUATION
Patient: Matilde Ferrera    Procedure Summary       Date: 08/08/24 Room / Location: [facility] MAIN OR [address] [place] [address] MAIN OR    Anesthesia Start: 1620 Anesthesia Stop:     Procedure: Right total knee arthroplasty with MRI templated patient specific instruments (Right: Knee) Diagnosis:       Primary osteoarthritis of right knee      Class 1 obesity due to excess calories with serious comorbidity and body mass index (BMI) of 33.0 to 33.9 in adult      (Primary osteoarthritis of right knee [M17.11]Class 1 obesity due to excess calories with serious comorbidity and body mass index (BMI) of 33.0 to 33.9 in adult [E66.09, Z68.33])    Surgeons: Dustin Mclean MD Anesthesiologist: Brannon Roger CRNA    Anesthesia Type: spinal ASA Status: 2            Anesthesia Type: spinal    Vitals Value Taken Time   BP 94/61 08/08/24 1829   Temp 98 °F (36.7 °C) 08/08/24 1829   Pulse 82 08/08/24 1829   Resp 14 08/08/24 1829   SpO2 95 % 08/08/24 1829       [facility] AN Post Evaluation:   Patient Evaluated in PACU  Patient Participation: complete - patient participated  Level of Consciousness: sleepy but conscious  Pain Score: 0  Pain Management: adequate  Airway Patency:patent  Dental exam unchanged from preop  Yes    Cardiovascular Status: acceptable  Respiratory Status: acceptable  Postoperative Hydration acceptable      Brannon Roger CRNA  8/8/2024 6:29 PM

## 2024-08-08 NOTE — DISCHARGE INSTRUCTIONS
- ambulate with a walker  - take pain medications as needed for pain  - ice as needed  - while taking pain medications, take a stool softener or laxative to keep bowel movements regular  - use incentive spirometer 10x per hour  - monitor for signs of infection (fever, chills, nausea, vomiting, increased pain, swelling, redness, odor)  - contact office with additional questions           Starting today, work on straightening and bending the knee.  Work several hours a day on bending and straightening your knee.  Pump feet often to help prevent blood clots. Weight bearing as tolerated with walker.  When sitting in a chair elevate the leg.  Ice for up to 20 minutes at a time. Place towel between ice and skin.     For wound care, each Mepilex dressing can be used for up to 3 days if it is not soiled.  Peel back dressing and paint wound with betadine and Q-tips daily. Do not double dip Q-tips. Sponge bath only. Take medications as prescribed in the discharge paperwork.    Call Dr. Mclean's office to make a follow-up appointment for 2 weeks.           Sometimes managing your health at home requires assistance.  A representative from the home health agency will contact you or your family to schedule your first visit.       [facility], [facility]  [address], [address]  [place] [address]  Phone: [phone]  Fax: [phone]

## 2024-08-08 NOTE — ANESTHESIA PROCEDURE NOTES
Spinal Block    Date/Time: 8/8/2024 4:20 PM    Performed by: Brannon Roger CRNA  Authorized by: Brannon Mccartney MD      General Information and Staff    Start Time:  8/8/2024 4:20 PM  End Time:  8/8/2024 4:30 PM  CRNA:  Brannon Roger CRNA  Performed by:  CRNA  Patient Location:  OR  Site identification: surface landmarks    Reason for Block: at surgeon's request and surgical anesthesia    Preanesthetic Checklist: patient identified, IV checked, risks and benefits discussed, monitors and equipment checked, pre-op evaluation, timeout performed, anesthesia consent and sterile technique used      Procedure Details    Patient Position:  Sitting  Prep: ChloraPrep and patient draped    Monitoring:  Continuous pulse ox and cardiac monitor  Approach:  Midline  Location:  L4-5  Injection Technique:  Single-shot    Needle    Needle Type:  Pencil-tip  Needle Gauge:  24 G    Assessment    Sensory Level:  T10  Events: clear CSF, CSF aspirated and well tolerated      Additional Comments

## 2024-08-08 NOTE — BRIEF OP NOTE
Pre-Operative Diagnosis: Primary osteoarthritis of right knee [M17.11]  Class 1 obesity due to excess calories with serious comorbidity and body mass index (BMI) of 32.0 to 32.9 in adult [E66.09, Z68.33]     Post-Operative Diagnosis: 1) primary osteoarthritis of right knee [M17.11], 2) class 1 obesity due to excess calories with serious comorbidity and body mass index (BMI) of 32.0 to 32.9 in adult [E66.09, Z68.33]      Procedure Performed:   Right total knee arthroplasty with MRI templated patient specific instruments    Surgeons and Role:     * Dustin Mclean MD - Primary    Assistant(s): Shameka Phipps PA-C (first Assistant); Endy Coe CSA (second assistant)    Anesthesia: Dr. Haas with spinal using Duramorph     Surgical Findings: Ramírez persona femur 9 right narrow, tibia D right with short stem extension, patella 38 x 9.5 mm, polyethylene 10 mm LPS Vivacit-E.  Tourniquet: 66 minutes at 300 mmHg.  Drain: None  Specimen: Bone cuts to pathology  Complications: None  Estimated Blood Loss: 50 cc  Stable to PACU.  Tranexamic acid 1 g IV second dose given in OR at end of case.    Dustin Mclean MD  8/8/2024  4:36 PM

## 2024-08-08 NOTE — ANESTHESIA PREPROCEDURE EVALUATION
Anesthesia PreOp Note    HPI:     Matilde Ferrera is a 59 year old female who presents for preoperative consultation requested by: Dustin Mclean MD    Date of Surgery: 8/8/2024    Procedure(s):  Right total knee arthroplasty with MRI templated patient specific instruments  Indication: Primary osteoarthritis of right knee [M17.11]  Class 1 obesity due to excess calories with serious comorbidity and body mass index (BMI) of 33.0 to 33.9 in adult [E66.09, Z68.33]    Relevant Problems   No relevant active problems       NPO:  Last Liquid Consumption Date: 08/07/24  Last Liquid Consumption Time: 1700  Last Solid Consumption Date: 08/07/24  Last Solid Consumption Time: 1700  Last Liquid Consumption Date: 08/07/24          History Review:  Patient Active Problem List    Diagnosis Date Noted    Primary osteoarthritis of left knee 11/03/2023    Class 1 obesity due to excess calories with serious comorbidity and body mass index (BMI) of 34.0 to 34.9 in adult 11/03/2023    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    Dyspareunia in female 06/14/2023    Diabetes mellitus, type 2 (HCC) 06/14/2023       Past Medical History:    Diabetes (HCC)    Diabetes mellitus (HCC)    Esophageal reflux    Migraines    Osteoarthritis    RIGHT ARM PAIN, left knee    Visual impairment    readers       Past Surgical History:   Procedure Laterality Date    Cholecystectomy      Cystoscopy,insert ureteral stent      bladder sling 7 years ago    Hysterectomy      Total knee replacement      Tubal ligation         Medications Prior to Admission   Medication Sig Dispense Refill Last Dose    ROSUVASTATIN 10 MG Oral Tab TAKE 1 TABLET(10 MG) BY MOUTH EVERY NIGHT 90 tablet 0 8/4/2024    estradiol (ESTRACE) 0.1 MG/GM Vaginal Cream Apply 0.5 gram vaginally 2  times per week. 42.5 g 3 8/7/2024 at 0900    gabapentin 300 MG Oral Cap Take 1 capsule (300 mg total) by mouth in the morning, at noon, and at bedtime. 270 capsule 1 8/4/2024    metFORMIN HCl 1000 MG Oral Tab Take 1  tablet (1,000 mg total) by mouth daily with breakfast AND 0.5 tablets (500 mg total) daily with dinner.   8/7/2024 at 1400    aspirin 325 MG Oral Tab EC Take 1 tablet (325 mg total) by mouth in the morning and 1 tablet (325 mg total) before bedtime. Do not crush.  After 30 days, resume once daily aspirin 81 mg as you were doing before surgery.. 60 tablet 0 8/4/2024    acetaminophen 325 MG Oral Tab Take 2 tablets (650 mg total) by mouth every 8 (eight) hours as needed for Pain. No more than 4000 mg acetaminophen/Tylenol per day from all sources.  Recall that each Norco has 325 mg of Tylenol in it. 60 tablet 0 not taking    Dulaglutide (TRULICITY) 4.5 MG/0.5ML Subcutaneous Solution Pen-injector Inject 4.5 mg into the skin once a week. 6 mL 1 More than a month    Glucose Blood (ONETOUCH ULTRA) In Vitro Strip 1 each by Other route in the morning and 1 each before bedtime. 200 strip 1      Current Facility-Administered Medications Ordered in Epic   Medication Dose Route Frequency Provider Last Rate Last Admin    lactated ringers infusion   Intravenous Continuous Dustin Mclean MD        acetaminophen (Tylenol Extra Strength) tab 1,000 mg  1,000 mg Oral Once Dustin Mclean MD        famotidine (Pepcid) tab 20 mg  20 mg Oral Once Dustin Mclean MD        Or    famotidine (Pepcid) 20 mg/2mL injection 20 mg  20 mg Intravenous Once Dustin Mclean MD        metoclopramide (Reglan) tab 10 mg  10 mg Oral Once Dustin Mclean MD        Or    metoclopramide (Reglan) 5 mg/mL injection 10 mg  10 mg Intravenous Once Dustin Mclean MD        ceFAZolin (Ancef) 2g in 10mL IV syringe premix  2 g Intravenous Once Dustin Mclean MD        clonidine-EPINEPHrine-ropivacaine-ketorolac (CERTS) (Duraclon-Adrenalin-Naropin-Toradol) pain cocktail irrigation   Intra-articular Once (Intra-Op) Dustin Mclean MD         No current [facility]-ordered outpatient medications on file.       No Known Allergies    Family History   Problem  Relation Age of Onset    Diabetes Father     Diabetes Mother      Social History     Socioeconomic History    Marital status:    Tobacco Use    Smoking status: Never     Passive exposure: Never    Smokeless tobacco: Never   Vaping Use    Vaping status: Never Used   Substance and Sexual Activity    Alcohol use: Not Currently    Drug use: Never    Sexual activity: Yes       Available pre-op labs reviewed.  Lab Results   Component Value Date    WBC 7.2 07/26/2024    RBC 4.48 07/26/2024    HGB 13.5 07/26/2024    HCT 40.1 07/26/2024    MCV 89.5 07/26/2024    MCH 30.1 07/26/2024    MCHC 33.7 07/26/2024    RDW 12.5 07/26/2024    .0 07/26/2024     Lab Results   Component Value Date     07/26/2024    K 3.9 07/26/2024     07/26/2024    CO2 26.0 07/26/2024    BUN 9 07/26/2024    CREATSERUM 0.49 (L) 07/26/2024    GLU 91 07/26/2024    CA 9.4 07/26/2024          Vital Signs:  Body mass index is 32.69 kg/m².   height is 1.473 m (4' 10\") and weight is 70.9 kg (156 lb 6.4 oz). Her oral temperature is 97.9 °F (36.6 °C). Her blood pressure is 114/64 and her pulse is 80. Her respiration is 18 and oxygen saturation is 95%.   Vitals:    07/30/24 1108 08/08/24 1447   BP:  114/64   Pulse:  80   Resp:  18   Temp:  97.9 °F (36.6 °C)   TempSrc:  Oral   SpO2:  95%   Weight: 71.7 kg (158 lb) 70.9 kg (156 lb 6.4 oz)   Height: 1.473 m (4' 10\") 1.473 m (4' 10\")        Anesthesia Evaluation     Patient summary reviewed and Nursing notes reviewed    Airway   Mallampati: I  TM distance: >3 FB  Neck ROM: full  Dental - Dentition appears grossly intact     Pulmonary - negative ROS and normal exam   Cardiovascular - negative ROS and normal exam    Neuro/Psych - negative ROS     GI/Hepatic/Renal    (+) GERD    Endo/Other    (+) diabetes mellitus  Abdominal  - normal exam                 Anesthesia Plan:   ASA:  2  Plan:   Spinal  Post-op Pain Management: IV analgesics and Intrathecal narcotics  Informed Consent Plan and Risks  Discussed With:  Patient  Use of Blood Products Discussed With:  Patient      I have informed aMtilde Ferrera and/or legal guardian or family member of the nature of the anesthetic plan, benefits, risks including possible dental damage if relevant, major complications, and any alternative forms of anesthetic management.   All of the patient's questions were answered to the best of my ability. The patient desires the anesthetic management as planned.  NERY FLORES MD  8/8/2024 2:56 PM  Present on Admission:  **None**

## 2024-08-09 ENCOUNTER — TELEPHONE (OUTPATIENT)
Dept: INTERNAL MEDICINE UNIT | Facility: HOSPITAL | Age: 59
End: 2024-08-09

## 2024-08-09 LAB
ANION GAP SERPL CALC-SCNC: 5 MMOL/L (ref 0–18)
ATRIAL RATE: 69 BPM
BUN BLD-MCNC: 10 MG/DL (ref 9–23)
BUN/CREAT SERPL: 21.7 (ref 10–20)
CALCIUM BLD-MCNC: 9.2 MG/DL (ref 8.7–10.4)
CHLORIDE SERPL-SCNC: 105 MMOL/L (ref 98–112)
CO2 SERPL-SCNC: 27 MMOL/L (ref 21–32)
CREAT BLD-MCNC: 0.46 MG/DL
EGFRCR SERPLBLD CKD-EPI 2021: 110 ML/MIN/1.73M2 (ref 60–?)
GLUCOSE BLD-MCNC: 116 MG/DL (ref 70–99)
GLUCOSE BLDC GLUCOMTR-MCNC: 118 MG/DL (ref 70–99)
GLUCOSE BLDC GLUCOMTR-MCNC: 119 MG/DL (ref 70–99)
GLUCOSE BLDC GLUCOMTR-MCNC: 125 MG/DL (ref 70–99)
HCT VFR BLD AUTO: 33.8 %
HGB BLD-MCNC: 11.5 G/DL
OSMOLALITY SERPL CALC.SUM OF ELEC: 284 MOSM/KG (ref 275–295)
P AXIS: 28 DEGREES
P-R INTERVAL: 210 MS
POTASSIUM SERPL-SCNC: 4.3 MMOL/L (ref 3.5–5.1)
Q-T INTERVAL: 406 MS
QRS DURATION: 88 MS
QTC CALCULATION (BEZET): 435 MS
R AXIS: 15 DEGREES
SODIUM SERPL-SCNC: 137 MMOL/L (ref 136–145)
T AXIS: 19 DEGREES
VENTRICULAR RATE: 69 BPM

## 2024-08-09 PROCEDURE — 99215 OFFICE O/P EST HI 40 MIN: CPT | Performed by: INTERNAL MEDICINE

## 2024-08-09 RX ORDER — MULTIPLE VITAMINS W/ MINERALS TAB 9MG-400MCG
1 TAB ORAL DAILY
Qty: 30 TABLET | Refills: 0 | Status: SHIPPED | OUTPATIENT
Start: 2024-08-10

## 2024-08-09 RX ORDER — HYDROCODONE BITARTRATE AND ACETAMINOPHEN 10; 325 MG/1; MG/1
1 TABLET ORAL EVERY 4 HOURS PRN
Qty: 25 TABLET | Refills: 0 | Status: SHIPPED | OUTPATIENT
Start: 2024-08-09 | End: 2024-08-13 | Stop reason: ALTCHOICE

## 2024-08-09 RX ORDER — ACETAMINOPHEN 325 MG/1
650 TABLET ORAL EVERY 6 HOURS PRN
Qty: 60 TABLET | Refills: 0 | Status: SHIPPED | OUTPATIENT
Start: 2024-08-09

## 2024-08-09 RX ORDER — CALCIUM CARBONATE-CHOLECALCIFEROL TAB 250 MG-125 UNIT 250-125 MG-UNIT
1 TAB ORAL 2 TIMES DAILY WITH MEALS
Qty: 60 TABLET | Refills: 0 | Status: SHIPPED | OUTPATIENT
Start: 2024-08-09

## 2024-08-09 RX ORDER — ASPIRIN 325 MG
325 TABLET, DELAYED RELEASE (ENTERIC COATED) ORAL 2 TIMES DAILY
Qty: 60 TABLET | Refills: 0 | Status: SHIPPED | OUTPATIENT
Start: 2024-08-09

## 2024-08-09 RX ORDER — NAPROXEN 250 MG/1
250 TABLET ORAL 2 TIMES DAILY WITH MEALS
Qty: 28 TABLET | Refills: 0 | Status: SHIPPED | OUTPATIENT
Start: 2024-08-09

## 2024-08-09 RX ORDER — PSEUDOEPHEDRINE HCL 30 MG
100 TABLET ORAL 2 TIMES DAILY
Qty: 20 CAPSULE | Refills: 0 | Status: SHIPPED | OUTPATIENT
Start: 2024-08-09

## 2024-08-09 NOTE — PLAN OF CARE
POD 1. Arrived from PACU. AO x4. Patient Monegasque speaking. Dressing to right knee CDI. Up 1 and RW. Ambulating in room. Unable to void freely. Straight cath x1 at 0200. V/s stable. On O2 at 2L NC. Remains stable. No chest pain. Encouraging IS. EKG results relayed to MD with no orders. ASA and SCDs for DVT prophylaxis. BG checked per order. Call light within reach. Fall precautions. Plan for PT eval.     Problem: Patient Centered Care  Goal: Patient preferences are identified and integrated in the patient's plan of care  Description: Interventions:  - What would you like us to know as we care for you? Home with family  - Provide timely, complete, and accurate information to patient/family  - Incorporate patient and family knowledge, values, beliefs, and cultural backgrounds into the planning and delivery of care  - Encourage patient/family to participate in care and decision-making at the level they choose  - Honor patient and family perspectives and choices  Outcome: Progressing     Problem: Patient/Family Goals  Goal: Patient/Family Long Term Goal  Description: Patient's Long Term Goal: no complications    Interventions:  - follow MD appointments  -monitor for s/sx of infection  -monitor v/s, labs  - See additional Care Plan goals for specific interventions  Outcome: Progressing  Goal: Patient/Family Short Term Goal  Description: Patient's Short Term Goal: able to void    Interventions:   - ambulate as tolerated  -drink PO fluids    - See additional Care Plan goals for specific interventions  Outcome: Progressing     Problem: PAIN - ADULT  Goal: Verbalizes/displays adequate comfort level or patient's stated pain goal  Description: INTERVENTIONS:  - Encourage pt to monitor pain and request assistance  - Assess pain using appropriate pain scale  - Administer analgesics based on type and severity of pain and evaluate response  - Implement non-pharmacological measures as appropriate and evaluate response  - Consider  cultural and social influences on pain and pain management  - Manage/alleviate anxiety  - Utilize distraction and/or relaxation techniques  - Monitor for opioid side effects  - Notify MD/LIP if interventions unsuccessful or patient reports new pain  - Anticipate increased pain with activity and pre-medicate as appropriate  Outcome: Progressing     Problem: RISK FOR INFECTION - ADULT  Goal: Absence of fever/infection during anticipated neutropenic period  Description: INTERVENTIONS  - Monitor WBC  - Administer growth factors as ordered  - Implement neutropenic guidelines  Outcome: Progressing     Problem: SAFETY ADULT - FALL  Goal: Free from fall injury  Description: INTERVENTIONS:  - Assess pt frequently for physical needs  - Identify cognitive and physical deficits and behaviors that affect risk of falls.  - [place] fall precautions as indicated by assessment.  - Educate pt/family on patient safety including physical limitations  - Instruct pt to call for assistance with activity based on assessment  - Modify environment to reduce risk of injury  - Provide assistive devices as appropriate  - Consider OT/PT consult to assist with strengthening/mobility  - Encourage toileting schedule  Outcome: Progressing     Problem: DISCHARGE PLANNING  Goal: Discharge to home or other facility with appropriate resources  Description: INTERVENTIONS:  - Identify barriers to discharge w/pt and caregiver  - Include patient/family/discharge partner in discharge planning  - Arrange for needed discharge resources and transportation as appropriate  - Identify discharge learning needs (meds, wound care, etc)  - Arrange for interpreters to assist at discharge as needed  - Consider post-discharge preferences of patient/family/discharge partner  - Complete POLST form as appropriate  - Assess patient's ability to be responsible for managing their own health  - Refer to Case Management Department for coordinating discharge planning if the  patient needs post-hospital services based on physician/LIP order or complex needs related to functional status, cognitive ability or social support system  Outcome: Progressing     Problem: RESPIRATORY - ADULT  Goal: Achieves optimal ventilation and oxygenation  Description: INTERVENTIONS:  - Assess for changes in respiratory status  - Assess for changes in mentation and behavior  - Position to facilitate oxygenation and minimize respiratory effort  - Oxygen supplementation based on oxygen saturation or ABGs  - Provide Smoking Cessation handout, if applicable  - Encourage broncho-pulmonary hygiene including cough, deep breathe, Incentive Spirometry  - Assess the need for suctioning and perform as needed  - Assess and instruct to report SOB or any respiratory difficulty  - Respiratory Therapy support as indicated  - Manage/alleviate anxiety  - Monitor for signs/symptoms of CO2 retention  Outcome: Progressing     Problem: GENITOURINARY - ADULT  Goal: Absence of urinary retention  Description: INTERVENTIONS:  - Assess patient’s ability to void and empty bladder  - Monitor intake/output and perform bladder scan as needed  - Follow urinary retention protocol/standard of care  - Consider collaborating with pharmacy to review patient's medication profile  - Implement strategies to promote bladder emptying  Outcome: Progressing     Problem: METABOLIC/FLUID AND ELECTROLYTES - ADULT  Goal: Glucose maintained within prescribed range  Description: INTERVENTIONS:  - Monitor Blood Glucose as ordered  - Assess for signs and symptoms of hyperglycemia and hypoglycemia  - Administer ordered medications to maintain glucose within target range  - Assess barriers to adequate nutritional intake and initiate nutrition consult as needed  - Instruct patient on self management of diabetes  Outcome: Progressing     Problem: SKIN/TISSUE INTEGRITY - ADULT  Goal: Incision(s), wounds(s) or drain site(s) healing without S/S of  infection  Description: INTERVENTIONS:  - Assess and document risk factors for pressure ulcer development  - Assess and document skin integrity  - Assess and document dressing/incision, wound bed, drain sites and surrounding tissue  - Implement wound care per orders  - Initiate isolation precautions as appropriate  - Initiate Pressure Ulcer prevention bundle as indicated  Outcome: Progressing     Problem: MUSCULOSKELETAL - ADULT  Goal: Return mobility to safest level of function  Description: INTERVENTIONS:  - Assess patient stability and activity tolerance for standing, transferring and ambulating w/ or w/o assistive devices  - Assist with transfers and ambulation using safe patient handling equipment as needed  - Ensure adequate protection for wounds/incisions during mobilization  - Obtain PT/OT consults as needed  - Advance activity as appropriate  - Communicate ordered activity level and limitations with patient/family  Outcome: Progressing  Goal: Maintain proper alignment of affected body part  Description: INTERVENTIONS:  - Support and protect limb and body alignment per provider's orders  - Instruct and reinforce with patient and family use of appropriate assistive device and precautions (e.g. spinal or hip dislocation precautions)  Outcome: Progressing

## 2024-08-09 NOTE — OCCUPATIONAL THERAPY NOTE
OCCUPATIONAL THERAPY EVALUATION - INPATIENT     Room Number: 433/433-A  Evaluation Date: 2024  Type of Evaluation: Initial  Presenting Problem: R TKA    Physician Order: IP Consult to Occupational Therapy  Reason for Therapy: ADL/IADL Dysfunction and Discharge Planning    OCCUPATIONAL THERAPY ASSESSMENT   Patient is a 59 year old female admitted 2024.  Prior to admission, patient's baseline is independent.  Patient is currently functioning near baseline with ADLs.    PLAN  Patient has been evaluated and presents with no skilled Occupational Therapy needs  at this time.  Patient will be discharged from Occupational Therapy services. Please re-order if a new functional limitation presents during this admission.    OT Device Recommendations: None      OCCUPATIONAL THERAPY MEDICAL/SOCIAL HISTORY   Problem List  Principal Problem:    Primary osteoarthritis of right knee  Active Problems:    Diabetes mellitus, type 2 (HCC)    Hyperlipidemia    HOME SITUATION  Type of Home: House  Home Layout: Able to live on main level; Two level  Lives With: Spouse; Daughter; Family (spouse using rollator other family will be present to assist pt at DC)  Toilet and Equipment: Standard height toilet  Shower/Tub and Equipment: Tub-shower combo; Shower chair  Drives: Yes  Patient Regularly Uses: None      Prior Level of [place]: independent    SUBJECTIVE  \"This is going better than when I had the other knee done.\"    OCCUPATIONAL THERAPY EXAMINATION    OBJECTIVE  Precautions: Limb alert - right;  needed  Fall Risk: Standard fall risk    WEIGHT BEARING RESTRICTION  R Lower Extremity: Weight Bearing as Tolerated  L Lower Extremity: Weight Bearing as Tolerated    PAIN ASSESSMENT  Ratin  Location: R knee with flexion  Management Techniques: Repositioning; Relaxation    ACTIVITY TOLERANCE  Pulse: 69        BP: 114/67             O2 SATURATIONS  Oxygen Therapy  SPO2% on Room Air at Rest: 98    COGNITION  Overall  Cognitive Status:  WFL - within functional limits    VISION  Current Vision: no visual deficits    Communication: WFL, . Pt prefers son to interpret    Behavioral/Emotional/Social: WFL    RANGE OF MOTION   Upper extremity ROM is within functional limits. Reported RUE pain with flexion/extension but WFL. Pt educated on HEP and icing to minmize pain    STRENGTH ASSESSMENT  Upper extremity strength is within functional limits     COORDINATION  Gross Motor: WFL   Fine Motor: WFL     ACTIVITIES OF DAILY LIVING ASSESSMENT  AM-PAC ‘6-Clicks’ Inpatient Daily Activity Short Form  How much help from another person does the patient currently need…  -   Putting on and taking off regular lower body clothing?: None  -   Bathing (including washing, rinsing, drying)?: A Little  -   Toileting, which includes using toilet, bedpan or urinal? : None  -   Putting on and taking off regular upper body clothing?: None  -   Taking care of personal grooming such as brushing teeth?: None  -   Eating meals?: None    AM-PAC Score:  Score: 23  Approx Degree of Impairment: 15.86%  Standardized Score (AM-PAC Scale): 51.12  CMS Modifier (G-Code): CI    FUNCTIONAL TRANSFER ASSESSMENT  Sit to Stand: Chair  Chair: Stand-by Assist  Toilet Transfer: Supervision       BALANCE ASSESSMENT  Static Sitting: Modified Independent  Static Standing: Supervision    FUNCTIONAL ADL ASSESSMENT  Grooming Standing: Supervision (hand hygiene at sink, cues for RW placement)  UB Dressing Standing: Supervision (posterior gown)  LB Dressing Seated: Supervision (R sock, increased time)  Toileting Standing: Supervision    Skilled Therapy Provided: RN approved session. Received patient in chair. Vitals: stable-RN informed. Performed ADLs/functional mobility/transfers as stated above. Primarily limited by pain with R knee flexion, standing balance, R knee ROM/strength.  Provided skilled cues/education on OT role, POC, functional transfer training, BADL  training, compensatory strategies, AE, balance, family/caregiver training, safety awareness, and patient with good receptiveness. At the end of session, patient left in chair with needs met, alarm on and RN aware of session.    EDUCATION PROVIDED  Patient: Plan of Care; Role of Occupational Therapy; Functional Transfer Techniques; Posture/Positioning; Compensatory ADL Techniques; Other; UE HEP for ROM (icing regimen)  Patient's Response to Education: Verbalized Understanding; Returned Demonstration  Family/Caregiver: Role of Occupational Therapy; Plan of Care; Functional Transfer Techniques; Fall Prevention; Posture/Positioning; Compensatory ADL Techniques  Family/Caregiver's Response to Education: Verbalized Understanding; Returned Demonstration    The patient's Approx Degree of Impairment: 15.86% has been calculated based on documentation in the [facility] '6 clicks' Inpatient Daily Activity Short Form.  Research supports that patients with this level of impairment may benefit from intermittent assist.  Final disposition will be made by interdisciplinary medical team.    Patient End of Session: Up in chair;Needs met;Call light within reach;RN aware of session/findings;All patient questions and concerns addressed;Family present    Patient was able to achieve the following ...   Patient able to toilet transfer  safely and independently/SBA/SPV    Patient able to dress lower extremities  safely and independently    Patient/Caregiver able to demonstrate safety with ADLS  safely and independently/SPV/SBA     Patient Evaluation Complexity Level:   Occupational Profile/Medical History LOW - Brief history including review of medical or therapy records    Specific performance deficits impacting engagement in ADL/IADL LOW  1 - 3 performance deficits    Client Assessment/Performance Deficits LOW - No comorbidities nor modifications of tasks    Clinical Decision Making LOW - Analysis of occupational profile, problem-focused  assessments, limited treatment options    Overall Complexity LOW     OT Session Time: 30 minutes  Self-Care Home Management: 10 minutes  Therapeutic Activity: 10 minutes  10 min rea Paul OTR/L

## 2024-08-09 NOTE — CM/SW NOTE
08/09/24 1100   Discharge disposition   Expected discharge disposition Home-Health   Post Acute Care Provider  Health [facility]  ([facility])   Discharge transportation Private car     MD AGUAYO order entered.   [facility] res via Aidin and notified of dc today.    [facility]  [address] [place] [address], [address]  [place] [address]  Phone: [phone]  Fax: [phone]    Caridad Koehler RN, BSN  Nurse   [phone]

## 2024-08-09 NOTE — OPERATIVE REPORT
[facility]    PATIENT'S NAME: MYLES MONTOYA   ATTENDING PHYSICIAN: Dustin Mclean MD   OPERATING PHYSICIAN: Dustin Mclean MD   PATIENT ACCOUNT#:   [id number]    LOCATION:  [address] [place] [address]  MEDICAL RECORD #:   [id number]       YOB: 1965  ADMISSION DATE:       08/08/2024      OPERATION DATE:  08/08/2024    OPERATIVE REPORT      PREOPERATIVE DIAGNOSIS:    1.   Primary osteoarthritis, right knee.  2.   Class 1 obesity with body mass index 32.69.   POSTOPERATIVE DIAGNOSIS:    1.   Primary osteoarthritis, right knee.  2.   Class 1 obesity with body mass index 32.69.   PROCEDURE:  Right total knee arthroplasty with MRI templated patient-specific instruments.     ASSISTANT:  First assistant, Shameka Phipps PA-C.  Second assistant, SHEY Jiang.    ANESTHESIA:  Dr. Haas, with spinal using Duramorph/MAC.    INDICATIONS:  The patient is a 59-year-old woman with the above diagnoses documented by physical exam, x-ray, and MRI.  She has failed nonoperative treatment, and the risks and complications of surgery were discussed and no guarantees were given.  The consent was signed.  She had a successful left total knee arthroplasty by me several months ago.    The patient has lost weight and at one point had a BMI of 35.  She now is down to a BMI of 32.  Despite this, her right knee continued to hurt.  Because of her obesity code, however, MRI templating and 2 surgical assistants were medically necessary.  In addition, she is quite a small woman standing only 4 feet 10 inches tall.  With the obesity code combined with her short stature, MRI templating was much more safe in terms of preventing iatrogenic injury during the case from standard instrumentation.  Furthermore, 2 surgical assistants were medically necessary for the proper retraction to implant the components in the proper orientation.  The assistants also safely transfer to the operative table, placed the tourniquet, helped with prep and  drape, retracted as above, helped with deep and superficial closure, placed the dressings, and helped safe transfer off the operative table to the recovery room.  Without 2 surgical assistants and MRI templating, the surgery would have been far more difficult and less safe, and therefore, all 3 were medically necessary.    OPERATIVE TECHNIQUE:  Patient was brought to the operating room and placed in supine position.  Appropriate IV access and monitors were placed.  Ancef 2 g IV and tranexamic acid 1 g IV were both given as prophylaxis at the appropriate interval.  Spinal with Duramorph followed by monitored sedation were performed by Dr. Haas.  Tourniquet was placed on the upper right thigh.  SCD boot was on the left leg.  The right lower extremity was then prepped and draped in sterile fashion with Betadine followed by ChloraPrep.  The leg was exsanguinated, and the tourniquet was inflated to 300 mmHg.  Tourniquet time for the case was 66 minutes.    Incision was made, and a midvastus snip was used to gain access to the right knee.  Tricompartmental osteoarthritis was confirmed, worse in the medial compartment.  Much of the fat pad was excised.  The patella was resected from 23 mm to 14 mm.  A 38 mm patella fit optimally, and the 3 drill holes placed.  A protector was placed, and the patella tucked in the lateral gutter.    The knee was flexed, and the cruciate ligaments and anterior menisci were excised.  The patient-specific guide fit well on the distal femur, and the pins were placed.  Distal femoral cuts were made as templated.  The knee was flexed further, and the size 8 cutting block as templated was applied.  Anterior cut was made without notch.  The posterior cuts looked quite large, however, much larger than templated.  We elected to move up to a size 9 cutting block.  This gave us 2 more mm posteriorly.  The cuts were then made as templated.  Chamfer cuts were finished.  There were no significant  osteophytes.    The rest of the menisci were excised, and the patient-specific guide was applied to the proximal tibia.  The drop dayna was in line with the anterior tibial spine with proper posterior slope.  Medial and lateral cuts on the tibia were made as templated.    The size D tibia fit as templated.  A 10 mm spacer block showed good balance in flexion and extension after the size 9 femoral cut.  The drop dayna was in line with the anterior tibial spine as well.    The tibia was nailed in its proper rotation.  The pegs were drilled for the femur, and the stem prepared for the tibia.  Two batches of Ramírez methylmethacrylate cement with gentamicin premixed were prepared in a vacuum container as she was diabetic.  The tibia followed by femur, followed by patella were cemented in standard fashion.  A spacer was placed, and an assistant held axial pressure while excess cement was removed, and the cement was allowed to harden.  During the last 4 minutes of cement drying, a dilute Betadine solution had been placed in the knee.    Once the cement had hardened, the Betadine was evacuated using saline and any posterior cement was removed.  Trial reduction with a 10 and 11 mm spacer showed the 10 mm fit best with full extension and full deep flexion.  There was also proper rollback.  There was good balance for the collateral ligaments at 0 degrees, 40 degrees, and 100 degrees.  The knee was checked again for any posterior cement, and the final irrigation was done with saline.  A 10 mm LPS Vivacit-E polyethylene was snapped into position with no soft tissue interposed.  Again, excellent motion, balance, tracking, and rollback were noted.    The tourniquet was let down, and the knee closed in flexion in layers with staples for the skin.  Dressings were applied.  The patient was taken off the operative table, brought to the recovery room in stable condition.  Tranexamic acid 1 g IV second dose was given in the OR toward the  end of the case after the tourniquet was let down.    Blood loss was 50 mL.  The specimens were the bone cuts to Pathology.  There were no drains, no complications, and the patient tolerated the procedure well.    Dictated By Dustin Mclean MD  d: 08/08/2024 18:28:00  t: 08/09/2024 03:20:14  [facility] [id number]  [facility]/    cc: Dolores Almanza MD

## 2024-08-09 NOTE — CONSULTS
[facility]    PATIENT'S NAME: MYLES MONTOYA   ATTENDING PHYSICIAN: Dustin Mclean MD   CONSULTING PHYSICIAN: Vipin Martin MD   PATIENT ACCOUNT#:   [id number]    LOCATION:  [address] [place] [address]  MEDICAL RECORD #:   [id number]       YOB: 1965  ADMISSION DATE:       08/08/2024      CONSULT DATE:  08/08/2024    REPORT OF CONSULTATION      REASON FOR CONSULTATION:  Post right total knee arthroplasty.    HISTORY OF PRESENT ILLNESS:  The patient is a 59-year-old  female with chronic right knee pain and underlying severe primary osteoarthritis, failed outpatient conservative medical management option.  Scheduled today for above-mentioned procedure by her orthopedic surgeon, Dr. Dustin Mclean.  Preoperatively, she had spinal block.  Postoperatively, transferred to PACU for further monitoring.    PAST MEDICAL HISTORY:  Generalized osteoarthritis, obesity, diabetes mellitus type 2, hyperlipidemia, gastroesophageal reflux disease, migraines.    PAST SURGICAL HISTORY:  Cholecystectomy, left total knee arthroplasty, bladder sling, and tubal ligation.    MEDICATIONS:  Please see medication reconciliation list.      ALLERGIES:  No known drug allergies.    SOCIAL HISTORY:  No tobacco, alcohol, or drug use.  Lives with her family.  Independent for basic activities of daily living.     FAMILY HISTORY:  Positive for diabetes mellitus type 2.    REVIEW OF SYSTEMS:  Currently resting in bed.  No right knee pain.  No chest pain.  No shortness of breath.  Other 12-point review of systems is negative.      PHYSICAL EXAMINATION:    GENERAL:  Alert and oriented to time, place, and person.  No acute distress.  VITAL SIGNS:  Temperature 98.0, pulse 82, respiratory rate 19, blood pressure 100/63, pulse ox 94% on room air.    HEENT:  Atraumatic.  Oropharynx clear.  Dry mucous membranes.  Ears, nose normal.  Eyes:  Anicteric sclerae.  NECK:  Supple.  No lymphadenopathy.  Trachea midline.  Full range of  motion.  LUNGS:  Clear to auscultation bilaterally.  Normal respiratory effort.    HEART:  Regular rate, rhythm.  S1 and S2 auscultated.  No murmur.  ABDOMEN:  Soft, nondistended.  No tenderness.  Positive bowel sounds.    EXTREMITIES:  Right knee dressing.  No leg edema, clubbing, or cyanosis.  NEUROLOGIC:  Decreased sensation and muscle movement both lower extremities post spinal block.  No other focal findings.    ASSESSMENT AND PLAN:    1.   Right knee primary osteoarthritis, status post right total knee arthroplasty, spinal block.  Pain control.  Neurovascular checks.  Aspirin for DVT prophylaxis.  Physical and occupational therapy.  2.   Hyperlipidemia.  Continue home medications.  3.   Diabetes mellitus type 2.  Continue home medications.  Monitor Accu-Cheks.  Sliding scale insulin.    Dictated By Vipin Martin MD  d: 08/08/2024 19:13:51  t: 08/08/2024 20:12:05  Job [id number]  FB/

## 2024-08-09 NOTE — PHYSICAL THERAPY NOTE
PHYSICAL THERAPY KNEE EVALUATION - INPATIENT      Room Number: 433/433-A  Evaluation Date: 8/9/2024  Type of Evaluation: Initial  Physician Order: PT Eval and Treat    Presenting Problem: Pt is s/p right TKA. Pt is WBAT for right LE  . PMH sign for left TKA 11/2023     Reason for Therapy: Mobility Dysfunction and Discharge Planning    PHYSICAL THERAPY ASSESSMENT   Patient is a 59 year old female admitted 8/8/2024 for Presenting Problem: Pt is s/p right TKA. Pt is WBAT for right LE  . PMH sign for left TKA 11/2023.  Prior to admission, patient's baseline is Indep ADL and community mobility with no AD .  Patient is currently functioning below baseline with bed mobility, transfers, gait, stair negotiation, standing prolonged periods, and performing household tasks.  Patient is requiring minimal assist as a result of the following impairments: decreased functional strength, decreased endurance/aerobic capacity, pain, impaired standing balance, medical status, limited right knee  ROM, and low BP with need for bolus of fluids prior to mobility with therapy .  Physical Therapy will continue to follow for duration of hospitalization.    Patient will benefit from continued skilled PT Services at discharge to promote prior level of function and safety with additional support and return home with home health PT.    PLAN  PT Treatment Plan: Bed mobility;Body mechanics;Endurance;Energy conservation;Family education;Patient education;Gait training;Range of motion;Strengthening;Stoop training;Stair training;Transfer training;Balance training  Rehab Potential : Good  Frequency (Obs): BID    PHYSICAL THERAPY MEDICAL/SOCIAL HISTORY   History related to current admission: ADMISSION DATE:       08/08/2024      OPERATION DATE:  08/08/2024     OPERATIVE REPORT        PREOPERATIVE DIAGNOSIS:    1.       Primary osteoarthritis, right knee.  2.       Class 1 obesity with body mass index 32.69.   POSTOPERATIVE DIAGNOSIS:    1.       Primary  osteoarthritis, right knee.  2.       Class 1 obesity with body mass index 32.69.   PROCEDURE:  Right total knee arthroplasty with MRI templated patient-specific instruments.      Problem List  Principal Problem:    Primary osteoarthritis of right knee  Active Problems:    Diabetes mellitus, type 2 (HCC)    Hyperlipidemia      HOME SITUATION  Home Situation  Type of Home: House  Home Layout: Able to live on main level;Two level  Stairs to Enter : 7  Railing: Yes  Lives With: Spouse;Daughter;Family (spouse using rollator other family will be present to assist pt at DC)  Patient Owned Equipment:  (shower chair and raised toliet  spouse has rollator)  Patient Regularly Uses: None       SUBJECTIVE  \"Pt reports feeling fatigued and sleepy \"   sitting up in chair     PHYSICAL THERAPY EXAMINATION   OBJECTIVE  Precautions: Limb alert - right; needed (low BP)  Fall Risk: High fall risk    WEIGHT BEARING RESTRICTION  Weight Bearing Restriction: L lower extremity           L Lower Extremity: Weight Bearing as Tolerated    PAIN ASSESSMENT  Ratin  Location: at rest denies pain       COGNITION  Overall Cognitive Status:  WFL - within functional limits    RANGE OF MOTION AND STRENGTH ASSESSMENT  Upper extremity ROM and strength are within functional limits   Left Lower extremity ROM is within functional limits   Left Lower extremity strength is within functional limits     Decreased right knee ROM and strength post sx . Pt with ace wrap dressing in place denies any numbness and tingling.  Initated with pt B AP / QS and HS reclined in chair with good tolerance , reps for instruction purpose.     BALANCE  Static Sitting: Not tested  Dynamic Sitting: Not tested  Static Standing: Not tested  Dynamic Standing: Not tested         ACTIVITY TOLERANCE  Pulse: 66        BP: (!) 73/48 (reassessed at 86/53  RN informed and arrives to assess pt --plan for fluids)  BP Location: Right arm  BP Method: Automatic  Patient Position:  Sitting    O2 WALK  Oxygen Therapy  SPO2% on Room Air at Rest: 97    AM-PAC '6-Clicks' INPATIENT SHORT FORM - BASIC MOBILITY  How much difficulty does the patient currently have...  Patient Difficulty: Turning over in bed (including adjusting bedclothes, sheets and blankets)?: A Little   Patient Difficulty: Sitting down on and standing up from a chair with arms (e.g., wheelchair, bedside commode, etc.): A Little   Patient Difficulty: Moving from lying on back to sitting on the side of the bed?: A Little   How much help from another person does the patient currently need...   Help from Another: Moving to and from a bed to a chair (including a wheelchair)?: A Little   Help from Another: Need to walk in hospital room?: A Little   Help from Another: Climbing 3-5 steps with a railing?: A Little     AM-PAC Score:  Raw Score: 18   Approx Degree of Impairment: 46.58%   Standardized Score (AM-PAC Scale): 43.63   CMS Modifier (G-Code): CK     FUNCTIONAL ABILITY STATUS  Functional Mobility/Gait Assessment  Gait Assistance: Not tested    Per RN staff pt has been up ambulating in room with RW with RN staff doing well.  Therapy assessing BP prior to mobility see above.   Due to low BP and plan for fluid bolus fxn mobility training with therapy held at this time.  Therapy using language line able to obtain social history and initiate post op education including post op exercises with plan to return later in day for follow up treatment / fxn mobility after fluid bolus.     Language line Guido # [id number]    Patient received seated edge of chair , agreeable to physical therapy. Vital signs monitored as noted above, patient experiencing fatigue and general sleepiness .     Education with pt and pt spouse using language line  provided verbally, via demonstration, on written handout, and while practicing during session on Total knee exercise protocol, Physical therapy plan of care, physiological benefits of out of bed mobility, and  initiated post op education , fxn mobility held due to low BP  .     The patient's Approx Degree of Impairment: 46.58% has been calculated based on documentation in the [facility] '6 clicks' Inpatient Basic Mobility Short Form.  Research supports that patients with this level of impairment may benefit from home with HH PT . . Due to low BP and plan for fluid bolus fxn mobility training with therapy held at this time.  Therapy using language line able to obtain social history and initiate post op education including post op exercises with plan to return later in day for follow up treatment / fxn mobility after fluid bolus.     Final disposition will be made by interdisciplinary medical team.    Patient End of Session: Up in chair;Needs met;Call light within reach;RN aware of session/findings;All patient questions and concerns addressed;Family present (plan for bolus of fluids therapy to return later in day for second visit)    CURRENT GOALS  Goals to be met by: 8/24/24   Patient Goal Patient's self-stated goal is: home possibly today    Goal #1 Patient is able to demonstrate supine - sit EOB @ level: modified independent     Goal #1   Current Status    Goal #2 Patient is able to demonstrate transfers Sit to/from Stand at assistance level: modified independent     Goal #2  Current Status    Goal #3 Patient is able to ambulate 300 feet with assistive device at assistance level: modified independent    Goal #3   Current Status    Goal #4 Patient will negotiate 7 stairs/one curb w/ assistive device and supervision   Goal #4   Current Status    Goal #5  AROM 0 degrees extension to 95 degrees flexion     Goal #5   Current Status    Goal #6 Patient independently performs home exercise program for ROM/strengthening per the instructions provided in preparation for discharge.   Goal #6  Current Status      Patient Evaluation Complexity Level:  History Low - no personal factors and/or co-morbidities   Examination of body systems Low  -  addressing 1-2 elements   Clinical Presentation  Moderate - Evolving   Clinical Decision Making  Low Complexity     PT eval and therapy activity 1 unit

## 2024-08-09 NOTE — PHYSICAL THERAPY NOTE
PHYSICAL THERAPY TREATMENT NOTE - INPATIENT     Room Number: 433/433-A       Presenting Problem: Pt is s/p right TKA. Pt is WBAT for right LE  . PMH sign for left TKA 2023       Problem List  Principal Problem:    Primary osteoarthritis of right knee  Active Problems:    Diabetes mellitus, type 2 (HCC)    Hyperlipidemia      PHYSICAL THERAPY ASSESSMENT   Patient demonstrates good  progress this session, goals  remain in progress.      Patient is requiring contact guard assist as a result of the following impairments: decreased functional strength, pain, impaired standing  balance, medical status, and limited right knee  ROM.     Patient continues to function below baseline with bed mobility, transfers, gait, stair negotiation, standing prolonged periods, and performing household tasks.  Next session anticipate patient to progress bed mobility, transfers, gait, stair negotiation, standing prolonged periods, and performing household tasks.  Physical Therapy will continue to follow patient for duration of hospitalization.    Patient continues to benefit from continued skilled PT services: at discharge to promote prior level of function and safety with additional support and return home with home health PT.    PLAN  PT Treatment Plan: Bed mobility;Body mechanics;Endurance;Energy conservation;Patient education;Family education;Gait training;Transfer training;Balance training;Stair training;Stoop training;Strengthening;Range of motion  Frequency (Obs): Daily    SUBJECTIVE  Agreeable to mobility   Goal for home      Pt with pain well controlled post sx   Pt denies any symptoms during activity     OBJECTIVE  Precautions: Limb alert - right; needed    WEIGHT BEARING RESTRICTION        R Lower Extremity: Weight Bearing as Tolerated  L Lower Extremity: Weight Bearing as Tolerated    PAIN ASSESSMENT   Ratin  Location: at rest denies pain       BALANCE  Static Sitting: Good  Dynamic Sitting: Fair +  Static  Standing: Fair -  Dynamic Standing: Fair -    ACTIVITY TOLERANCE  Pulse: 69 (activity)        BP: 110/63 (resting   standing /72 no symptoms   post activity no symptoms drop to 106/95) MD present at end of session and informed of asymptomatic pt drop in BP   BP Location: Right arm  BP Method: Automatic  Patient Position: Sitting     O2 WALK  Oxygen Therapy  SPO2% on Room Air at Rest: 94  SPO2% Ambulation on Room Air: 99    AM-PAC '6-Clicks' INPATIENT SHORT FORM - BASIC MOBILITY  How much difficulty does the patient currently have...  Patient Difficulty: Turning over in bed (including adjusting bedclothes, sheets and blankets)?: A Little   Patient Difficulty: Sitting down on and standing up from a chair with arms (e.g., wheelchair, bedside commode, etc.): A Little   Patient Difficulty: Moving from lying on back to sitting on the side of the bed?: A Little   How much help from another person does the patient currently need...   Help from Another: Moving to and from a bed to a chair (including a wheelchair)?: A Little   Help from Another: Need to walk in hospital room?: A Little   Help from Another: Climbing 3-5 steps with a railing?: A Little     AM-PAC Score:  Raw Score: 18   Approx Degree of Impairment: 46.58%   Standardized Score (AM-PAC Scale): 43.63   CMS Modifier (G-Code): CK    FUNCTIONAL ABILITY STATUS  Functional Mobility/Gait Assessment  Gait Assistance:  (SBA)  Distance (ft): 300 feet in hallway and therapy gym  --amb in room for tolieting  Assistive Device:  (JRW provided)  Pattern: R Decreased stance time;R Steppage;L Steppage (slowly progress to step through gait pattern)  Stairs: Stairs;Stoop/curb  How Many Stairs: 8  Device: 2 Rails  Assist: Contact guard assist  Pattern: Ascend and Descend  Ascend and Descend : Step to  Stoop/Curb: CGA for curb steps with RW  Rolling: contact guard assist  Supine to Sit: contact guard assist    Sit to Stand: stand-by assist    Patient received supine in bed,  agreeable to physical therapy. Vital signs monitored as noted above, no adverse symptoms and patient stable during session. Pt dtr in room translating for pt. Pt with plans to DC to dtr home.  Family training with dtr completed.   Pt able to return demonstration of post op exercises including : B AP / B QS / right HS / right seated knee flexion and right LAQ reps to tolerance for instruction purpose.  Pt tolerating exercise well.   Pt denies any right knee instability or symptoms upon standing.   B        Education with pt  provided verbally, via demonstration, on written handout, and while practicing during session on Total knee exercise protocol, Physical therapy plan of care, physiological benefits of out of bed mobility, and fall risk prevention , fxn mobility training with RW , post op TKA therapy pt education and DC Planning  . Both pt and pt dtr verbalized understanding of education denies questions or concerns for DC to home setting possibly today pending medical status.     The patient's Approx Degree of Impairment: 46.58% has been calculated based on documentation in the [facility] '6 clicks' Inpatient Daily Activity Short Form.  Research supports that patients with this level of impairment may benefit from home with HH PT .   Pt provided with JRW this session.   Pt with goal for DC to home setting.  Therapy anticipates pt will progress to DC to home setting with HH PT recommended and 24hr family support when medically stable.  Pt with lower BP receiving fluid bolus and difficulty with urination.       Final disposition will be made by interdisciplinary medical team.      Patient End of Session: Up in chair;Needs met;Call light within reach;RN aware of session/findings;All patient questions and concerns addressed;Ice applied;Family present    CURRENT GOALS   Goals to be met by: 8/24/24   Patient Goal Patient's self-stated goal is: home possibly today    Goal #1 Patient is able to demonstrate supine - sit EOB @  level: modified independent      Goal #1   Current Status  CGA   Goal #2 Patient is able to demonstrate transfers Sit to/from Stand at assistance level: modified independent      Goal #2  Current Status  SBA    Goal #3 Patient is able to ambulate 300 feet with assistive device at assistance level: modified independent    Goal #3   Current Status  as above    Goal #4 Patient will negotiate 7 stairs/one curb w/ assistive device and supervision   Goal #4   Current Status  as above    Goal #5  AROM 0 degrees extension to 95 degrees flexion     Goal #5   Current Status  in progress    Goal #6 Patient independently performs home exercise program for ROM/strengthening per the instructions provided in preparation for discharge.   Goal #6  Current Sta In progress      Therapy activity 1 unit   Therapy exercise 1 unit    Gait 1 unit

## 2024-08-09 NOTE — PROGRESS NOTES
[facility]  part of [facility]    Progress Note    Matilde Ferrera Patient Status:  Outpatient in a Bed    3/30/1965 MRN [id number]   Location [facility] 4W/SW/SE Attending Ced Alvarez MD   Hosp Day # 0 PCP Dolores Almanza MD       Subjective:   Subjective:  Awake, seated in bedside chair.   at bedside.  Language line present for translation.  Overall patient reports she is doing well.  Denies any numbness, tingling, or calf pain.  Currently patient's blood pressure is low, getting IV saline.  Objective:   Blood pressure (!) 73/48, pulse 66, temperature 97.6 °F (36.4 °C), temperature source Oral, resp. rate 18, height 4' 10\" (1.473 m), weight 155 lb 14.4 oz (70.7 kg), SpO2 100%.  Physical Exam  Dressing on, clean and dry.  Lower extremity skin healthy, no pitting edema.  Calf soft nontender.  Able to straight leg raise against resistance.  Able to dorsiflex and plantarflex against resistance.  Right knee range of motion 0 to 95 degrees, no instability.  Denies any numbness or tingling.    Results:   Lab Results   Component Value Date    WBC 7.2 2024    HGB 11.5 (L) 2024    HCT 33.8 (L) 2024    .0 2024    CREATSERUM 0.46 (L) 2024    BUN 10 2024     2024    K 4.3 2024     2024    CO2 27.0 2024     (H) 2024    CA 9.2 2024    ALB 4.4 2024    ALKPHO 101 2024    BILT 0.7 2024    TP 7.3 2024    AST 31 2024    ALT 38 2024    TSH 2.260 2023    ESRML 10 2024    CRP <0.40 2024    CK 80 2023       XR CHEST AP PORTABLE  (CPT=71045)    Result Date: 2024  CONCLUSION: No radiographic evidence of acute cardiopulmonary abnormality.  Preliminary report was given by [facility].  There are no clinically significant discrepancies.    Dictated by (CST): Lio López MD on 2024 at 8:46 AM     Finalized by (CST): Lio López MD on  8/09/2024 at 8:46 AM          XR KNEE (1 OR 2 VIEWS), RIGHT (CPT=73560)    Result Date: 8/8/2024  CONCLUSION:  1. Anatomically aligned right total knee arthroplasty.  No immediate complication.    Dictated by (CST): Min Lai MD on 8/08/2024 at 7:31 PM     Finalized by (CST): Min Lai MD on 8/08/2024 at 7:31 PM         EKG 12 Lead    Result Date: 8/9/2024  Sinus rhythm with marked sinus arrythmia with 1st degree A-V block Inferior infarct , age undetermined Abnormal ECG No previous ECGs found in [facility]     Assessment & Plan:     Primary osteoarthritis of right knee  Patient doing well postop day 1 right total knee replacement.  Wound care, home exercise and self rehab, and medications with the patient.  From an orthopedic standpoint patient is okay to be discharged home when she is cleared by medicine and physical therapy.  She will continue working with physical therapy for the remainder of her hospital stay.      Diabetes mellitus, type 2 (HCC)  Per Medicine      Hyperlipidemia  Per medicine              GABRIEL Cevallos  8/9/2024

## 2024-08-09 NOTE — PROGRESS NOTES
[facility]     Matilde Ferrera Patient Status:  Outpatient in a Bed    3/30/1965 MRN [id number]   Location [facility] 4W/SW/SE Attending Ced Alvarez MD   Hosp Day # 0 PCP Dolores Almanza MD     Subjective:     Patient resting comfortably and curious about discharge. Complaining about urinary retention.  Worked well with therapy.      Objective:    Review of Systems:   ROS completed; pertinent positive and negatives stated in subjective.      Vital signs:  Temp:  [97.4 °F (36.3 °C)-98 °F (36.7 °C)] 97.6 °F (36.4 °C)  Pulse:  [64-82] 66  Resp:  [13-19] 16  BP: ()/(48-68) 99/60  SpO2:  [93 %-100 %] 97 %      Physical Exam:    Gen: NAD AO x3  Chest: good air entry CTABL  CVS: normal s1 and s2 RR  Abd: NABS soft NT ND  Neuro: CN 2-12 grossly intact  Ext: no edema in bilateral LE      Diagnostic Data:    Labs:  Recent Labs   Lab 24  0538   HGB 11.5*       Recent Labs   Lab 24  0538   *   BUN 10   CREATSERUM 0.46*   CA 9.2      K 4.3      CO2 27.0       Estimated Creatinine Clearance: 147 mL/min (A) (based on SCr of 0.46 mg/dL (L)).    No results for input(s): \"PTP\", \"INR\" in the last 168 hours.           Imaging: Imaging data reviewed in Epic.    Medications:    gabapentin  300 mg Oral TID    metFORMIN HCl  1,000 mg Oral Daily with breakfast    And    metFORMIN HCl  500 mg Oral Daily with dinner    rosuvastatin  10 mg Oral Nightly    insulin aspart  1-7 Units Subcutaneous TID CC    sennosides  17.2 mg Oral Nightly    docusate sodium  100 mg Oral BID    aspirin  325 mg Oral BID    naproxen  250 mg Oral BID with meals    calcium carbonate-vitamin D  1 tablet Oral BID with meals    multivitamin with minerals  1 tablet Oral Daily       Assessment & Plan:     OA s/p R TKA  Tolerated surgery well  IVF, Abx, DVT ppx per ortho  PT/OT  HLD  Continue statin  T2DM  SSI and frequent accuchecks      Plan of care discussed  with patient or family at bedside.      Supplementary Documentation:     Quality:  DVT Prophylaxis: ASA  CODE status: Full      Estimated date of discharge: TBD  Discharge is dependent on: clinical stability  At this point Ms. Ferrera is expected to be discharge to: home                   Ced Alvarez MD  Hospitalist    MDM: High, I personally reviewed the available laboratories, imaging. I discussed the case with RN. I ordered laboratories, studies including AM labs.  Medical decision making high, risk is high.       The 21st Century Cures Act makes medical notes like these available to patients in the interest of transparency. Please be advised this is a medical document. Medical documents are intended to carry relevant information, facts as evident, and the clinical opinion of the practitioner. The medical note is intended as peer to peer communication and may appear blunt or direct. It is written in medical language and may contain abbreviations or verbiage that are unfamiliar.

## 2024-08-09 NOTE — PLAN OF CARE
Problem: Patient Centered Care  Goal: Patient preferences are identified and integrated in the patient's plan of care  Description: Interventions:  - What would you like us to know as we care for you?   - Provide timely, complete, and accurate information to patient/family  - Incorporate patient and family knowledge, values, beliefs, and cultural backgrounds into the planning and delivery of care  - Encourage patient/family to participate in care and decision-making at the level they choose  - Honor patient and family perspectives and choices  Outcome: Progressing     Problem: Patient/Family Goals  Goal: Patient/Family Long Term Goal  Description: Patient's Long Term Goal:     Interventions:  -   - See additional Care Plan goals for specific interventions  Outcome: Progressing  Goal: Patient/Family Short Term Goal  Description: Patient's Short Term Goal:     Interventions:   -   - See additional Care Plan goals for specific interventions  Outcome: Progressing     Problem: PAIN - ADULT  Goal: Verbalizes/displays adequate comfort level or patient's stated pain goal  Description: INTERVENTIONS:  - Encourage pt to monitor pain and request assistance  - Assess pain using appropriate pain scale  - Administer analgesics based on type and severity of pain and evaluate response  - Implement non-pharmacological measures as appropriate and evaluate response  - Consider cultural and social influences on pain and pain management  - Manage/alleviate anxiety  - Utilize distraction and/or relaxation techniques  - Monitor for opioid side effects  - Notify MD/LIP if interventions unsuccessful or patient reports new pain  - Anticipate increased pain with activity and pre-medicate as appropriate  Outcome: Progressing     Problem: RISK FOR INFECTION - ADULT  Goal: Absence of fever/infection during anticipated neutropenic period  Description: INTERVENTIONS  - Monitor WBC  - Administer growth factors as ordered  - Implement neutropenic  guidelines  Outcome: Progressing     Problem: SAFETY ADULT - FALL  Goal: Free from fall injury  Description: INTERVENTIONS:  - Assess pt frequently for physical needs  - Identify cognitive and physical deficits and behaviors that affect risk of falls.  - Ramona fall precautions as indicated by assessment.  - Educate pt/family on patient safety including physical limitations  - Instruct pt to call for assistance with activity based on assessment  - Modify environment to reduce risk of injury  - Provide assistive devices as appropriate  - Consider OT/PT consult to assist with strengthening/mobility  - Encourage toileting schedule  Outcome: Progressing     Problem: DISCHARGE PLANNING  Goal: Discharge to home or other facility with appropriate resources  Description: INTERVENTIONS:  - Identify barriers to discharge w/pt and caregiver  - Include patient/family/discharge partner in discharge planning  - Arrange for needed discharge resources and transportation as appropriate  - Identify discharge learning needs (meds, wound care, etc)  - Arrange for interpreters to assist at discharge as needed  - Consider post-discharge preferences of patient/family/discharge partner  - Complete POLST form as appropriate  - Assess patient's ability to be responsible for managing their own health  - Refer to Case Management Department for coordinating discharge planning if the patient needs post-hospital services based on physician/LIP order or complex needs related to functional status, cognitive ability or social support system  Outcome: Progressing     Problem: RESPIRATORY - ADULT  Goal: Achieves optimal ventilation and oxygenation  Description: INTERVENTIONS:  - Assess for changes in respiratory status  - Assess for changes in mentation and behavior  - Position to facilitate oxygenation and minimize respiratory effort  - Oxygen supplementation based on oxygen saturation or ABGs  - Provide Smoking Cessation handout, if applicable  -  Encourage broncho-pulmonary hygiene including cough, deep breathe, Incentive Spirometry  - Assess the need for suctioning and perform as needed  - Assess and instruct to report SOB or any respiratory difficulty  - Respiratory Therapy support as indicated  - Manage/alleviate anxiety  - Monitor for signs/symptoms of CO2 retention  Outcome: Progressing     Problem: GENITOURINARY - ADULT  Goal: Absence of urinary retention  Description: INTERVENTIONS:  - Assess patient’s ability to void and empty bladder  - Monitor intake/output and perform bladder scan as needed  - Follow urinary retention protocol/standard of care  - Consider collaborating with pharmacy to review patient's medication profile  - Implement strategies to promote bladder emptying  Outcome: Progressing     Problem: METABOLIC/FLUID AND ELECTROLYTES - ADULT  Goal: Glucose maintained within prescribed range  Description: INTERVENTIONS:  - Monitor Blood Glucose as ordered  - Assess for signs and symptoms of hyperglycemia and hypoglycemia  - Administer ordered medications to maintain glucose within target range  - Assess barriers to adequate nutritional intake and initiate nutrition consult as needed  - Instruct patient on self management of diabetes  Outcome: Progressing     Problem: SKIN/TISSUE INTEGRITY - ADULT  Goal: Incision(s), wounds(s) or drain site(s) healing without S/S of infection  Description: INTERVENTIONS:  - Assess and document risk factors for pressure ulcer development  - Assess and document skin integrity  - Assess and document dressing/incision, wound bed, drain sites and surrounding tissue  - Implement wound care per orders  - Initiate isolation precautions as appropriate  - Initiate Pressure Ulcer prevention bundle as indicated  Outcome: Progressing     Problem: MUSCULOSKELETAL - ADULT  Goal: Return mobility to safest level of function  Description: INTERVENTIONS:  - Assess patient stability and activity tolerance for standing,  transferring and ambulating w/ or w/o assistive devices  - Assist with transfers and ambulation using safe patient handling equipment as needed  - Ensure adequate protection for wounds/incisions during mobilization  - Obtain PT/OT consults as needed  - Advance activity as appropriate  - Communicate ordered activity level and limitations with patient/family  Outcome: Progressing  Goal: Maintain proper alignment of affected body part  Description: INTERVENTIONS:  - Support and protect limb and body alignment per provider's orders  - Instruct and reinforce with patient and family use of appropriate assistive device and precautions (e.g. spinal or hip dislocation precautions)  Outcome: Progressing     Primarily Maltese-speaking, translation services via language line and family at bedside. Alert and oriented x4. Reports moderate-severe pain to R knee. Pain managed with scheduled naproxen and prn oral norco and gel ice packs. Hypotensive this morning, 500 ml normal saline bolus given. Voiding freely. Normal saline infusing @ 83 ml/hr, IV abx continued. Tolerating carb-controlled diet. Discussed w/ family and physicians staying one more evening given patient's pain, hypotensive this morning.

## 2024-08-10 ENCOUNTER — APPOINTMENT (OUTPATIENT)
Dept: ULTRASOUND IMAGING | Facility: HOSPITAL | Age: 59
End: 2024-08-10
Payer: MEDICAID

## 2024-08-10 VITALS
BODY MASS INDEX: 32.72 KG/M2 | HEART RATE: 79 BPM | RESPIRATION RATE: 18 BRPM | TEMPERATURE: 98 F | DIASTOLIC BLOOD PRESSURE: 76 MMHG | SYSTOLIC BLOOD PRESSURE: 120 MMHG | WEIGHT: 155.88 LBS | HEIGHT: 58 IN | OXYGEN SATURATION: 97 %

## 2024-08-10 LAB
ALBUMIN SERPL-MCNC: 3.6 G/DL (ref 3.2–4.8)
ALBUMIN/GLOB SERPL: 1.4 {RATIO} (ref 1–2)
ALP LIVER SERPL-CCNC: 92 U/L
ALT SERPL-CCNC: 18 U/L
ANION GAP SERPL CALC-SCNC: 6 MMOL/L (ref 0–18)
AST SERPL-CCNC: 27 U/L (ref ?–34)
BASOPHILS # BLD AUTO: 0.04 X10(3) UL (ref 0–0.2)
BASOPHILS NFR BLD AUTO: 0.4 %
BILIRUB SERPL-MCNC: 0.4 MG/DL (ref 0.3–1.2)
BUN BLD-MCNC: 7 MG/DL (ref 9–23)
BUN/CREAT SERPL: 14 (ref 10–20)
CALCIUM BLD-MCNC: 8.5 MG/DL (ref 8.7–10.4)
CHLORIDE SERPL-SCNC: 111 MMOL/L (ref 98–112)
CO2 SERPL-SCNC: 28 MMOL/L (ref 21–32)
CREAT BLD-MCNC: 0.5 MG/DL
DEPRECATED RDW RBC AUTO: 43.5 FL (ref 35.1–46.3)
EGFRCR SERPLBLD CKD-EPI 2021: 108 ML/MIN/1.73M2 (ref 60–?)
EOSINOPHIL # BLD AUTO: 0.43 X10(3) UL (ref 0–0.7)
EOSINOPHIL NFR BLD AUTO: 4.7 %
ERYTHROCYTE [DISTWIDTH] IN BLOOD BY AUTOMATED COUNT: 12.8 % (ref 11–15)
GLOBULIN PLAS-MCNC: 2.5 G/DL (ref 2–3.5)
GLUCOSE BLD-MCNC: 109 MG/DL (ref 70–99)
GLUCOSE BLDC GLUCOMTR-MCNC: 105 MG/DL (ref 70–99)
HCT VFR BLD AUTO: 33.8 %
HGB BLD-MCNC: 11.1 G/DL
IMM GRANULOCYTES # BLD AUTO: 0.02 X10(3) UL (ref 0–1)
IMM GRANULOCYTES NFR BLD: 0.2 %
LYMPHOCYTES # BLD AUTO: 3.63 X10(3) UL (ref 1–4)
LYMPHOCYTES NFR BLD AUTO: 39.7 %
MCH RBC QN AUTO: 30.4 PG (ref 26–34)
MCHC RBC AUTO-ENTMCNC: 32.8 G/DL (ref 31–37)
MCV RBC AUTO: 92.6 FL
MONOCYTES # BLD AUTO: 0.99 X10(3) UL (ref 0.1–1)
MONOCYTES NFR BLD AUTO: 10.8 %
NEUTROPHILS # BLD AUTO: 4.04 X10 (3) UL (ref 1.5–7.7)
NEUTROPHILS # BLD AUTO: 4.04 X10(3) UL (ref 1.5–7.7)
NEUTROPHILS NFR BLD AUTO: 44.2 %
OSMOLALITY SERPL CALC.SUM OF ELEC: 299 MOSM/KG (ref 275–295)
PLATELET # BLD AUTO: 282 10(3)UL (ref 150–450)
POTASSIUM SERPL-SCNC: 4.6 MMOL/L (ref 3.5–5.1)
PROT SERPL-MCNC: 6.1 G/DL (ref 5.7–8.2)
RBC # BLD AUTO: 3.65 X10(6)UL
SODIUM SERPL-SCNC: 145 MMOL/L (ref 136–145)
WBC # BLD AUTO: 9.2 X10(3) UL (ref 4–11)

## 2024-08-10 PROCEDURE — 99214 OFFICE O/P EST MOD 30 MIN: CPT | Performed by: INTERNAL MEDICINE

## 2024-08-10 PROCEDURE — 93971 EXTREMITY STUDY: CPT

## 2024-08-10 NOTE — PLAN OF CARE
Patient is Aox4 on RA. 1x walker. SCDS and ASA. ACHS accucheck. Voiding freely. Norco given for pain. Plan for Home w/ HH when med clear. Brazilian sp.       Problem: Patient Centered Care  Goal: Patient preferences are identified and integrated in the patient's plan of care  Description: Interventions:  - What would you like us to know as we care for you? I am Brazilian Speaking  - Provide timely, complete, and accurate information to patient/family  - Incorporate patient and family knowledge, values, beliefs, and cultural backgrounds into the planning and delivery of care  - Encourage patient/family to participate in care and decision-making at the level they choose  - Honor patient and family perspectives and choices  Outcome: Progressing     Problem: PAIN - ADULT  Goal: Verbalizes/displays adequate comfort level or patient's stated pain goal  Description: INTERVENTIONS:  - Encourage pt to monitor pain and request assistance  - Assess pain using appropriate pain scale  - Administer analgesics based on type and severity of pain and evaluate response  - Implement non-pharmacological measures as appropriate and evaluate response  - Consider cultural and social influences on pain and pain management  - Manage/alleviate anxiety  - Utilize distraction and/or relaxation techniques  - Monitor for opioid side effects  - Notify MD/LIP if interventions unsuccessful or patient reports new pain  - Anticipate increased pain with activity and pre-medicate as appropriate  Outcome: Progressing     Problem: RISK FOR INFECTION - ADULT  Goal: Absence of fever/infection during anticipated neutropenic period  Description: INTERVENTIONS  - Monitor WBC  - Administer growth factors as ordered  - Implement neutropenic guidelines  Outcome: Progressing     Problem: SAFETY ADULT - FALL  Goal: Free from fall injury  Description: INTERVENTIONS:  - Assess pt frequently for physical needs  - Identify cognitive and physical deficits and behaviors  that affect risk of falls.  - Jay fall precautions as indicated by assessment.  - Educate pt/family on patient safety including physical limitations  - Instruct pt to call for assistance with activity based on assessment  - Modify environment to reduce risk of injury  - Provide assistive devices as appropriate  - Consider OT/PT consult to assist with strengthening/mobility  - Encourage toileting schedule  Outcome: Progressing     Problem: DISCHARGE PLANNING  Goal: Discharge to home or other facility with appropriate resources  Description: INTERVENTIONS:  - Identify barriers to discharge w/pt and caregiver  - Include patient/family/discharge partner in discharge planning  - Arrange for needed discharge resources and transportation as appropriate  - Identify discharge learning needs (meds, wound care, etc)  - Arrange for interpreters to assist at discharge as needed  - Consider post-discharge preferences of patient/family/discharge partner  - Complete POLST form as appropriate  - Assess patient's ability to be responsible for managing their own health  - Refer to Case Management Department for coordinating discharge planning if the patient needs post-hospital services based on physician/LIP order or complex needs related to functional status, cognitive ability or social support system  Outcome: Progressing     Problem: RESPIRATORY - ADULT  Goal: Achieves optimal ventilation and oxygenation  Description: INTERVENTIONS:  - Assess for changes in respiratory status  - Assess for changes in mentation and behavior  - Position to facilitate oxygenation and minimize respiratory effort  - Oxygen supplementation based on oxygen saturation or ABGs  - Provide Smoking Cessation handout, if applicable  - Encourage broncho-pulmonary hygiene including cough, deep breathe, Incentive Spirometry  - Assess the need for suctioning and perform as needed  - Assess and instruct to report SOB or any respiratory difficulty  -  Respiratory Therapy support as indicated  - Manage/alleviate anxiety  - Monitor for signs/symptoms of CO2 retention  Outcome: Progressing     Problem: GENITOURINARY - ADULT  Goal: Absence of urinary retention  Description: INTERVENTIONS:  - Assess patient’s ability to void and empty bladder  - Monitor intake/output and perform bladder scan as needed  - Follow urinary retention protocol/standard of care  - Consider collaborating with pharmacy to review patient's medication profile  - Implement strategies to promote bladder emptying  Outcome: Progressing     Problem: METABOLIC/FLUID AND ELECTROLYTES - ADULT  Goal: Glucose maintained within prescribed range  Description: INTERVENTIONS:  - Monitor Blood Glucose as ordered  - Assess for signs and symptoms of hyperglycemia and hypoglycemia  - Administer ordered medications to maintain glucose within target range  - Assess barriers to adequate nutritional intake and initiate nutrition consult as needed  - Instruct patient on self management of diabetes  Outcome: Progressing     Problem: SKIN/TISSUE INTEGRITY - ADULT  Goal: Incision(s), wounds(s) or drain site(s) healing without S/S of infection  Description: INTERVENTIONS:  - Assess and document risk factors for pressure ulcer development  - Assess and document skin integrity  - Assess and document dressing/incision, wound bed, drain sites and surrounding tissue  - Implement wound care per orders  - Initiate isolation precautions as appropriate  - Initiate Pressure Ulcer prevention bundle as indicated  Outcome: Progressing     Problem: MUSCULOSKELETAL - ADULT  Goal: Return mobility to safest level of function  Description: INTERVENTIONS:  - Assess patient stability and activity tolerance for standing, transferring and ambulating w/ or w/o assistive devices  - Assist with transfers and ambulation using safe patient handling equipment as needed  - Ensure adequate protection for wounds/incisions during mobilization  - Obtain  PT/OT consults as needed  - Advance activity as appropriate  - Communicate ordered activity level and limitations with patient/family  Outcome: Progressing  Goal: Maintain proper alignment of affected body part  Description: INTERVENTIONS:  - Support and protect limb and body alignment per provider's orders  - Instruct and reinforce with patient and family use of appropriate assistive device and precautions (e.g. spinal or hip dislocation precautions)  Outcome: Progressing

## 2024-08-10 NOTE — DISCHARGE PLANNING
Matilde is A&Ox4 on room air. She is discharging home in care of her  and daughter. Mepilex was changed, and leg was cleansed with iodone. Patient was taught how to do it and demonstrated teach back. All questions have been answered and all belongings have been sent with patient.

## 2024-08-10 NOTE — PHYSICAL THERAPY NOTE
PHYSICAL THERAPY TREATMENT NOTE - INPATIENT     Room Number: 433/433-A       Presenting Problem: Pt is s/p right TKA. Pt is WBAT for right LE  . PMH sign for left TKA 11/2023       Problem List  Principal Problem:    Primary osteoarthritis of right knee  Active Problems:    Diabetes mellitus, type 2 (HCC)    Hyperlipidemia      PHYSICAL THERAPY ASSESSMENT   Patient demonstrates good  progress this session, goals  remain in progress.      Patient is requiring contact guard assist and minimal assist as a result of the following impairments: decreased functional strength, decreased endurance/aerobic capacity, and pain.     Patient continues to function near baseline with bed mobility, transfers, and gait.  Next session anticipate patient to progress bed mobility, transfers, and gait.  Physical Therapy will continue to follow patient for duration of hospitalization.    Patient continues to benefit from continued skilled PT services: at discharge to promote prior level of function.  Anticipate patient will return home with home health PT.    PLAN  PT Treatment Plan: Bed mobility  Frequency (Obs): Daily    SUBJECTIVE  Pt reports being ready for PT RX    OBJECTIVE  Precautions: Limb alert - right; needed    WEIGHT BEARING RESTRICTION        R Lower Extremity: Weight Bearing as Tolerated  L Lower Extremity: Weight Bearing as Tolerated    PAIN ASSESSMENT   Rating: Unable to rate  Location: at rest denies pain       BALANCE  Static Sitting: Good  Dynamic Sitting: Fair +  Static Standing: Fair -  Dynamic Standing: Fair -    ACTIVITY TOLERANCE                          O2 WALK       AM-PAC '6-Clicks' INPATIENT SHORT FORM - BASIC MOBILITY  How much difficulty does the patient currently have...  Patient Difficulty: Turning over in bed (including adjusting bedclothes, sheets and blankets)?: A Little   Patient Difficulty: Sitting down on and standing up from a chair with arms (e.g., wheelchair, bedside commode, etc.): A  Little   Patient Difficulty: Moving from lying on back to sitting on the side of the bed?: A Little   How much help from another person does the patient currently need...   Help from Another: Moving to and from a bed to a chair (including a wheelchair)?: A Little   Help from Another: Need to walk in hospital room?: A Little   Help from Another: Climbing 3-5 steps with a railing?: A Little     AM-PAC Score:  Raw Score: 18   Approx Degree of Impairment: 46.58%   Standardized Score (AM-PAC Scale): 43.63   CMS Modifier (G-Code): CK    FUNCTIONAL ABILITY STATUS  Functional Mobility/Gait Assessment  Gait Assistance: Contact guard assist  Distance (ft): 2 x 100  Assistive Device: Rolling walker  Pattern: R Decreased stance time;Shuffle  Stairs: Stairs  How Many Stairs: 8  Device: 2 Rails  Assist: Contact guard assist  Pattern: Ascend and Descend  Ascend and Descend : Step to  Stoop/Curb: CGA for curb steps with RW  Rolling: minimal assist  Supine to Sit: minimal assist  Sit to Supine: minimal assist  Sit to Stand: minimal assist    Skilled Therapy Provided: Pt seen daily.Min a for bed mobility and transfer;Extra time provided to complete task.Pt amb 2 x 100 ft with RW and CGA;provided cuing for gait pattern as well as for postural awareness.Navigated 8 stairs with CGA.There ex.    The patient's Approx Degree of Impairment: 46.58% has been calculated based on documentation in the [facility] '6 clicks' Inpatient Daily Activity Short Form.  Research supports that patients with this level of impairment may benefit from Home with Home Health PT.  Final disposition will be made by interdisciplinary medical team.    THERAPEUTIC EXERCISES  Lower Extremity Ankle pumps  Glut sets  Heel slides  Quad sets     Position Supine       Patient End of Session: Up in chair    CURRENT GOALS     Patient Goal Patient's self-stated goal is: home possibly today    Goal #1 Patient is able to demonstrate supine - sit EOB @ level: modified independent       Goal #1   Current Status  Min a   Goal #2 Patient is able to demonstrate transfers Sit to/from Stand at assistance level: modified independent      Goal #2  Current Status  Min a   Goal #3 Patient is able to ambulate 300 feet with assistive device at assistance level: modified independent    Goal #3   Current Status  as above    Goal #4 Patient will negotiate 7 stairs/one curb w/ assistive device and supervision   Goal #4   Current Status  as above    Goal #5  AROM 0 degrees extension to 95 degrees flexion     Goal #5   Current Status  in progress    Goal #6 Patient independently performs home exercise program for ROM/strengthening per the instructions provided in preparation for discharge.   Goal #6  Current Sta In progress      Gait Training: 15 minutes  Therapeutic Activity: 15 minutes  Neuromuscular Re-education:  minutes  Therapeutic Exercise: 15 minutes  Canalith Repositioning:  minutes  Manual Therapy:  minutes  Can add/delete any of these

## 2024-08-10 NOTE — DISCHARGE SUMMARY
[facility]  part of [facility]    DISCHARGE SUMMARY     Matilde Ferrera Patient Status:  Outpatient in a Bed    3/30/1965 MRN [id number]   Location [facility] 4W/SW/SE Attending Ced Alvarez MD   Hosp Day # 0 PCP Dolores Almanza MD     Date of Admission: 2024  Date of Discharge:  8/10/2024    Discharge Disposition: Home Health Care Services    Discharge Diagnosis:     OA s/p R TKA  HLD  T2DM    History of Present Illness:     The patient is a 59-year-old  female with chronic right knee pain and underlying severe primary osteoarthritis, failed outpatient conservative medical management option. Scheduled today for above-mentioned procedure by her orthopedic surgeon, Dr. Dustin Mclean. Preoperatively, she had spinal block. Postoperatively, transferred to PACU for further monitoring.     Brief Synopsis:     OA s/p R TKA  Tolerated surgery well  IVF, Abx, DVT ppx per ortho  PT/OT  Patient cleared for discharge by ortho. Patient is to follow up with PCP and Ortho as opt for further care  HLD  Continue statin  T2DM  SSI and frequent accuchecks    Discharge medications including DVT ppx and pain meds ordered by ortho.   Patient is to remain compliant with all discharge medications and instructions and to follow up as advised.   Patient encouraged to make healthy lifestyle and dietary changes.    Lace+ Score: 27  59-90 High Risk  29-58 Medium Risk  0-28   Low Risk       TCM Follow-Up Recommendation:  LACE 29-58: Moderate Risk of readmission after discharge from the hospital.      Procedures during hospitalization:   R TKA    Incidental or significant findings and recommendations (brief descriptions):  None    Lab/Test results pending at Discharge:   None    Consultants:  Ortho    Discharge Medication List:     Discharge Medications        START taking these medications        Instructions Prescription details   calcium carbonate-vitamin D 250-3.125 MG-MCG Tabs  Commonly known as: Oyster  Shell-D      Take 1 tablet by mouth 2 (two) times daily with meals.   Quantity: 60 tablet  Refills: 0     docusate sodium 100 MG Caps  Commonly known as: COLACE      Take 100 mg by mouth 2 (two) times daily. Do not crush. Stop if loose stool   Quantity: 20 capsule  Refills: 0     HYDROcodone-acetaminophen  MG Tabs  Commonly known as: Norco      Take 1 tablet by mouth every 4 (four) hours as needed. No alcohol or driving on this medication.  Stop if lethargic or hallucinating.  Maximum 4000 mg Tylenol/acetaminophen daily from all sources.  Recall each Norco has 325 mg of Tylenol/acetaminophen in it.   Quantity: 25 tablet  Refills: 0     multivitamin with minerals Tabs      Take 1 tablet by mouth daily.   Quantity: 30 tablet  Refills: 0     naproxen 250 MG Tabs  Commonly known as: Naprosyn      Take 1 tablet (250 mg total) by mouth 2 (two) times daily with meals. Take with food.  Stop if stomach upset.   Quantity: 28 tablet  Refills: 0            CHANGE how you take these medications        Instructions Prescription details   acetaminophen 325 MG Tabs  Commonly known as: Tylenol  What changed:   when to take this  additional instructions      Take 2 tablets (650 mg total) by mouth every 6 (six) hours as needed for Pain. Maximum 4000 mg Tylenol/acetaminophen daily from all sources.  Each Norco has 325 mg of Tylenol/acetaminophen in it.   Quantity: 60 tablet  Refills: 0     aspirin 325 MG Tbec  What changed: additional instructions      Take 1 tablet (325 mg total) by mouth in the morning and 1 tablet (325 mg total) before bedtime. Do not crush.   Quantity: 60 tablet  Refills: 0            CONTINUE taking these medications        Instructions Prescription details   estradiol 0.1 MG/GM Crea  Commonly known as: Estrace      Apply 0.5 gram vaginally 2  times per week.   Quantity: 42.5 g  Refills: 3     gabapentin 300 MG Caps  Commonly known as: Neurontin      Take 1 capsule (300 mg total) by mouth in the morning, at  noon, and at bedtime.   Quantity: 270 capsule  Refills: 1     metFORMIN HCl 1000 MG Tabs  Commonly known as: GLUCOPHAGE      Take 1 tablet (1,000 mg total) by mouth daily with breakfast AND 0.5 tablets (500 mg total) daily with dinner.   Refills: 0     OneTouch Ultra Strp  Generic drug: Glucose Blood      1 each by Other route in the morning and 1 each before bedtime.   Quantity: 200 strip  Refills: 1     rosuvastatin 10 MG Tabs  Commonly known as: Crestor      TAKE 1 TABLET(10 MG) BY MOUTH EVERY NIGHT   Quantity: 90 tablet  Refills: 0            STOP taking these medications      Trulicity 4.5 MG/0.5ML Sopn  Generic drug: Dulaglutide                  Where to Get Your Medications        These medications were sent to [id number] - [place], [place] - [address] AT [facility], [phone], [phone] [address] [address]      Phone: [phone]   acetaminophen 325 MG Tabs  aspirin 325 MG Tbec  calcium carbonate-vitamin D 250-3.125 MG-MCG Tabs  docusate sodium 100 MG Caps  HYDROcodone-acetaminophen  MG Tabs  multivitamin with minerals Tabs  naproxen 250 MG Tabs         [place]    Follow-up appointment:   Dustin Mclean MD  [address]  [place] [address] [phone]    Schedule an appointment as soon as possible for a visit in 12 day(s)      Dolores Almanza MD  [address]  [facility] [address] [phone]    Follow up in 1 week(s)      Appointments for Next 30 Days 8/10/2024 - 9/9/2024        Date Arrival Time Visit Type Length Department Provider     8/20/2024  8:00 AM  POST OP VISIT [[id number]] 30 min [facility], [facility], Shameka Boles PA    Patient Instructions:         Location Instructions:     Your appointment is located at [address] in [place], [place].&nbsp; Please park in the Yellow lot and enter through the [facility] entrance.&nbsp; Then proceed to [address].   Masks are optional for all patients and visitors, unless otherwise indicated.               8/28/2024 12:30 PM  [facility] VIRTUAL PHONE VISIT [[id number]] 15 min [facility] - [facility] Caroline Chung DO    Patient Instructions:     You have been scheduled for a Virtual Telephone visit with your provider. Please be available at your phone so that your physician can contact you, and be prepared with any questions or concerns. As always, your health is our priority.     We suggest confirming with your insurance regarding coverage prior to your appointment as some payors may no longer cover telephone visits. Depending on your insurance you may also be billed a copay at a later time.        Location Instructions:     The [facility] is located at [address] [place], [place].  You may park in the Yellow Parking area located next to the [facility].&nbsp; Also, free  service is available.&nbsp; Our office is then located on the fourth floor, [address].  Masks are optional for all patients and visitors, unless otherwise indicated.                      Vital signs:  Temp:  [97.6 °F (36.4 °C)-98.2 °F (36.8 °C)] 97.6 °F (36.4 °C)  Pulse:  [66-79] 79  Resp:  [16-18] 18  BP: ()/(60-76) 120/76  SpO2:  [95 %-97 %] 97 %    Physical Exam:    Gen: NAD AO x3  Chest: good air entry CTABL  CVS: normal s1 and s2 RR  Abd: NABS soft NT ND  Neuro: CN 2-12 grossly intact  Ext: no edema in bilateral LE    -----------------------------------------------------------------------------------------------  PATIENT DISCHARGE INSTRUCTIONS: See electronic chart    Ced Alvarez MD  Hospitalist    Time spent:  > 30 minutes    The 21st Century Cures Act makes medical notes like these available to patients in the interest of transparency. Please be advised this is a medical document. Medical documents are intended to carry relevant information, facts as  evident, and the clinical opinion of the practitioner. The medical note is intended as peer to peer communication and may appear blunt or direct. It is written in medical language and may contain abbreviations or verbiage that are unfamiliar.

## 2024-08-10 NOTE — PROGRESS NOTES
[facility]  part of [facility]    Progress Note    Matilde Ferrera Patient Status:  Outpatient in a Bed    3/30/1965 MRN [id number]   Location [facility] 4W/SW/SE Attending Ced Alvarez MD   Hosp Day # 0 PCP Dolores Almanza MD         Subjective:   Subjective:  Patient awake, seated in bedside chair.  Language line present for help with translation.  Overall patient reports that she is doing well.  Does endorse some pain throughout her right lower extremity.  Notes that she has some numbness along her right shin, but is improving with time.  Also endorses some right calf pain.  Objective:   Blood pressure 120/76, pulse 79, temperature 97.6 °F (36.4 °C), temperature source Temporal, resp. rate 18, height 4' 10\" (1.473 m), weight 155 lb 14.4 oz (70.7 kg), SpO2 97%.  Physical Exam  Dressing on clean and dry.  Right knee has minimal asymmetric warmth and swelling, consistent with postoperative changes.  Lower extremity skin healthy, no pitting edema.  Right calf tender to palpation.  Able to straight leg raise against resistance.  Able to dorsiflex and plantarflex against resistance.  Right knee range of motion 0 to 75 degrees, no instability.  Endorses some numbness along her distal shin.    Results:   Lab Results   Component Value Date    WBC 9.2 08/10/2024    HGB 11.1 (L) 08/10/2024    HCT 33.8 (L) 08/10/2024    .0 08/10/2024    CREATSERUM 0.50 (L) 08/10/2024    BUN 7 (L) 08/10/2024     08/10/2024    K 4.6 08/10/2024     08/10/2024    CO2 28.0 08/10/2024     (H) 08/10/2024    CA 8.5 (L) 08/10/2024    ALB 3.6 08/10/2024    ALKPHO 92 08/10/2024    BILT 0.4 08/10/2024    TP 6.1 08/10/2024    AST 27 08/10/2024    ALT 18 08/10/2024    TSH 2.260 2023    ESRML 10 2024    CRP <0.40 2024    CK 80 2023       XR CHEST AP PORTABLE  (CPT=71045)    Result Date: 2024  CONCLUSION: No radiographic evidence of acute cardiopulmonary abnormality.   Preliminary report was given by [facility].  There are no clinically significant discrepancies.    Dictated by (CST): Lio López MD on 8/09/2024 at 8:46 AM     Finalized by (CST): Lio López MD on 8/09/2024 at 8:46 AM          XR KNEE (1 OR 2 VIEWS), RIGHT (CPT=73560)    Result Date: 8/8/2024  CONCLUSION:  1. Anatomically aligned right total knee arthroplasty.  No immediate complication.    Dictated by (CST): Min Lai MD on 8/08/2024 at 7:31 PM     Finalized by (CST): Min Lai MD on 8/08/2024 at 7:31 PM         EKG 12 Lead    Result Date: 8/9/2024  Sinus rhythm with marked sinus arrythmia with 1st degree A-V block Inferior infarct , age undetermined Abnormal ECG No previous ECGs found in [facility] Confirmed by CARON HOLDEN ([id number]) on 8/9/2024 3:26:33 PM     Assessment & Plan:     Primary osteoarthritis of right knee  Patient doing well postop day #2 right total knee replacement.  Discussed with the patient home exercise as well as medications and wound care.  For the patient's right calf pain, we will order an ultrasound to rule out DVT.  From an orthopedic standpoint, once the patient is cleared by PT/OT, medicine, and has had her ultrasound she is able to be discharged from an orthopedic standpoint.      Diabetes mellitus, type 2 (HCC)  Per medicine      Hyperlipidemia  Per medicine              GABRIEL Cevallos  8/10/2024

## 2024-08-10 NOTE — PROGRESS NOTES
[facility]  part of [facility] Progress Note     Matilde Ferrera Patient Status:  Outpatient in a Bed    3/30/1965 MRN [id number]   Location [facility] 4W/SW/SE Attending Ced Alvarez MD   Hosp Day # 0 PCP Dolores Almanza MD     Subjective:     Patient resting comfortably and curious about discharge. Complaining about urinary retention.  Worked well with therapy.      Objective:    Review of Systems:   ROS completed; pertinent positive and negatives stated in subjective.      Vital signs:  Temp:  [97.6 °F (36.4 °C)-98.2 °F (36.8 °C)] 97.6 °F (36.4 °C)  Pulse:  [66-79] 79  Resp:  [16-18] 18  BP: ()/(60-76) 120/76  SpO2:  [95 %-97 %] 97 %      Physical Exam:    Gen: NAD AO x3  Chest: good air entry CTABL  CVS: normal s1 and s2 RR  Abd: NABS soft NT ND  Neuro: CN 2-12 grossly intact  Ext: no edema in bilateral LE      Diagnostic Data:    Labs:  Recent Labs   Lab 24  0538 08/10/24  0451   WBC  --  9.2   HGB 11.5* 11.1*   MCV  --  92.6   PLT  --  282.0       Recent Labs   Lab 24  0538 08/10/24  0451   * 109*   BUN 10 7*   CREATSERUM 0.46* 0.50*   CA 9.2 8.5*   ALB  --  3.6    145   K 4.3 4.6    111   CO2 27.0 28.0   ALKPHO  --  92   AST  --  27   ALT  --  18   BILT  --  0.4   TP  --  6.1       Estimated Creatinine Clearance: 135.2 mL/min (A) (based on SCr of 0.5 mg/dL (L)).    No results for input(s): \"PTP\", \"INR\" in the last 168 hours.           Imaging: Imaging data reviewed in Epic.    Medications:    gabapentin  300 mg Oral TID    metFORMIN HCl  1,000 mg Oral Daily with breakfast    And    metFORMIN HCl  500 mg Oral Daily with dinner    rosuvastatin  10 mg Oral Nightly    insulin aspart  1-7 Units Subcutaneous TID CC    sennosides  17.2 mg Oral Nightly    docusate sodium  100 mg Oral BID    aspirin  325 mg Oral BID    naproxen  250 mg Oral BID with meals    calcium carbonate-vitamin D  1 tablet Oral BID with meals    multivitamin with  minerals  1 tablet Oral Daily       Assessment & Plan:     OA s/p R TKA  Tolerated surgery well  IVF, Abx, DVT ppx per ortho  PT/OT  R Calf pain  US pending to rule out DVT  HLD  Continue statin  T2DM  SSI and frequent accuchecks  Disposition  Patient cleared for discharge by PT/OT  Patient cleared by ortho if US is negative      Plan of care discussed with patient or family at bedside.      Supplementary Documentation:     Quality:  DVT Prophylaxis: ASA  CODE status: Full      Estimated date of discharge: TBD  Discharge is dependent on: clinical stability  At this point Ms. Ferrera is expected to be discharge to: home                   Ced Alvarez MD  Hospitalist    MDM: High, I personally reviewed the available laboratories, imaging. I discussed the case with RN. I ordered laboratories, studies including AM labs.  Medical decision making high, risk is high.       The 21st Century Cures Act makes medical notes like these available to patients in the interest of transparency. Please be advised this is a medical document. Medical documents are intended to carry relevant information, facts as evident, and the clinical opinion of the practitioner. The medical note is intended as peer to peer communication and may appear blunt or direct. It is written in medical language and may contain abbreviations or verbiage that are unfamiliar.

## 2024-08-12 ENCOUNTER — TELEPHONE (OUTPATIENT)
Dept: ORTHOPEDICS CLINIC | Facility: CLINIC | Age: 59
End: 2024-08-12

## 2024-08-12 NOTE — TELEPHONE ENCOUNTER
Post-op call for Dr. Mclean    Date: 8/12/2024      Time: 11:27 AM   Patient: Matilde WILCOX number: [id number]   Surgery and surgery date:8/8/2024        Procedure Laterality Anesthesia   Right total knee arthroplasty with MRI templated patient specific instruments          Patient unavailable.  Left message on voicemail to call clinic with questions or concerns.  Number was provided.SPOKE TO PATIENT VIA DAUGHTER WITH PATIENTS PERMISSION  Patient's general feeling since discharge:\"bit of pain,and dizzy from the medication\" She is taking it with food. Suggested they break the pill in half and it will be not as strong to make her dizzy.  Pain control (0-10):/5  Pain Medication:  dose/strength/aspirin 325 MG Oral Tab EC     Medication Quantity Refills Start End   HYDROcodone-acetaminophen  MG Oral Tab         Medication Quantity Refills Start End   naproxen 250 MG Oral Tab       The patient is taking tylenol on her own. 650mg about every 5 hours. I spoke with the daughter that the maximum is 4000mg in 24 hours including the 325mg in the hydrcodone  Fever:  no  Chills:  no  SOB:  no  Incision site appearance:  Redness  no  Drainage no  Clean/dry/Intact yes   Calf pain, redness or warmth:  no   Bowel Regimen: yes   If not, what are you taking stool softener/laxative?  Confirmed appointment date for post op:8/20/2024  Other concerns patients may ask: Went over ice protocol and being careful not to flex her hip joint by bending/  Bathing and bandages   Patient needs to keep incision clean and dry for the first week./DONE  Enforce rest, ice, compression elevation and use their pain medication accordingly./DONE  If the patient needs more pain medication, please put in a communication.

## 2024-08-13 ENCOUNTER — TELEPHONE (OUTPATIENT)
Dept: ORTHOPEDICS CLINIC | Facility: CLINIC | Age: 59
End: 2024-08-13

## 2024-08-13 RX ORDER — GABAPENTIN 300 MG/1
300 CAPSULE ORAL 3 TIMES DAILY
Qty: 270 CAPSULE | Refills: 1 | OUTPATIENT
Start: 2024-08-13

## 2024-08-13 NOTE — TELEPHONE ENCOUNTER
simeon from [facility] called. With the pt now.  Surgery 8-8-24 right knee.  Pt is complaining of pain from ankle to upper right leg.  Muscle spasm of right foot.  Will you rx.  Call transferred to the nurse.

## 2024-08-13 NOTE — TELEPHONE ENCOUNTER
Spoke with Amy from Home Health. Endorses that patient unable to sleep at night due to pain and muscle spasms. I did note that a [employer] message had been sent to patient account earlier and new prescription was sent. Let her know that a new prescription had been sent for patient to [facility] in [place]. Advised that the new pain medication percocet, would be replacing the Norco. Advised to try this and see how it goes and to reach out if no relief. She endorsed that patient had no signs/symptoms of infection or calf pain. She asked if they could send Physical Therapy for patient and I said absolutely, as home care certification specified Physical Therapy/ occupational therapy. Went over specifications and RN stated they had received the order.

## 2024-08-13 NOTE — TELEPHONE ENCOUNTER
Spoke with daughter Sharri and notified her the gabapentin refill was send on 5/6/2024 for a 6 month supply.She will call [facility] pharmacy for the refill.      Outpatient Medication Detail     Disp Refills Start End    gabapentin 300 MG Oral Cap 270 capsule 1 5/6/2024 --    Sig - Route: Take 1 capsule (300 mg total) by mouth in the morning, at noon, and at bedtime. - Oral    Sent to pharmacy as: Gabapentin 300 MG Oral Capsule (Neurontin)    Notes to Pharmacy: ZERO refills remain on this prescription. Your patient is requesting advance approval of refills for this medication to PREVENT ANY MISSED DOSES    E-Prescribing Status: Receipt confirmed by pharmacy (5/6/2024 12:15 PM CDT)      Pharmacy    [id number] [address] AT [facility], [phone], [phone]

## 2024-08-20 ENCOUNTER — HOSPITAL ENCOUNTER (OUTPATIENT)
Dept: GENERAL RADIOLOGY | Facility: HOSPITAL | Age: 59
Discharge: HOME | End: 2024-08-20
Payer: MEDICAID

## 2024-08-20 ENCOUNTER — OFFICE VISIT (OUTPATIENT)
Dept: ORTHOPEDICS CLINIC | Facility: CLINIC | Age: 59
End: 2024-08-20
Payer: MEDICAID

## 2024-08-20 DIAGNOSIS — Z47.89 ORTHOPEDIC AFTERCARE: ICD-10-CM

## 2024-08-20 DIAGNOSIS — M17.11 PRIMARY OSTEOARTHRITIS OF RIGHT KNEE: ICD-10-CM

## 2024-08-20 DIAGNOSIS — M79.661 PAIN AND SWELLING OF LOWER LEG, RIGHT: ICD-10-CM

## 2024-08-20 DIAGNOSIS — Z96.651 S/P TKR (TOTAL KNEE REPLACEMENT) USING CEMENT, RIGHT: Primary | ICD-10-CM

## 2024-08-20 DIAGNOSIS — M79.89 PAIN AND SWELLING OF LOWER LEG, RIGHT: ICD-10-CM

## 2024-08-20 PROCEDURE — 73562 X-RAY EXAM OF KNEE 3: CPT

## 2024-08-20 PROCEDURE — 99024 POSTOP FOLLOW-UP VISIT: CPT

## 2024-08-20 NOTE — PROGRESS NOTES
NURSING INTAKE COMMENTS:   Chief Complaint   Patient presents with    Post-Op     Right TKA, denies pain, currently in PT       HPI: This 59 year old female presents today with family members for help with translation.  Approximately 2 weeks status post right total knee replacement.  Overall patient reports that she is feeling okay.  She denies any fever, chills, night sweats.  Denies any numbness or tingling.  Patient is ambulating with a walker.  She is taking Percocet as needed for pain.  Taking aspirin twice daily for anticoagulation.  Patient does note that she has some pain along the inside of her foot.  She also notes that when she elevates her leg she feels throbbing in her toes.    Past Medical History:    Diabetes (HCC)    Diabetes mellitus (HCC)    Esophageal reflux    Hyperlipidemia    Migraines    Osteoarthritis    RIGHT ARM PAIN, left knee    Visual impairment    readers     Past Surgical History:   Procedure Laterality Date    Cholecystectomy      Cystoscopy,insert ureteral stent      bladder sling 7 years ago    Hysterectomy      Total knee replacement      Tubal ligation       Current Outpatient Medications   Medication Sig Dispense Refill    acetaminophen 325 MG Oral Tab Take 2 tablets (650 mg total) by mouth every 6 (six) hours as needed for Pain. Maximum 4000 mg Tylenol/acetaminophen daily from all sources.  Each Norco has 325 mg of Tylenol/acetaminophen in it. 60 tablet 0    aspirin 325 MG Oral Tab EC Take 1 tablet (325 mg total) by mouth in the morning and 1 tablet (325 mg total) before bedtime. Do not crush. 60 tablet 0    calcium carbonate-vitamin D 250-3.125 MG-MCG Oral Tab Take 1 tablet by mouth 2 (two) times daily with meals. 60 tablet 0    docusate sodium 100 MG Oral Cap Take 100 mg by mouth 2 (two) times daily. Do not crush. Stop if loose stool 20 capsule 0    multivitamin with minerals Oral Tab Take 1 tablet by mouth daily. 30 tablet 0    naproxen 250 MG Oral Tab Take 1 tablet (250 mg  total) by mouth 2 (two) times daily with meals. Take with food.  Stop if stomach upset. 28 tablet 0    ROSUVASTATIN 10 MG Oral Tab TAKE 1 TABLET(10 MG) BY MOUTH EVERY NIGHT 90 tablet 0    estradiol (ESTRACE) 0.1 MG/GM Vaginal Cream Apply 0.5 gram vaginally 2  times per week. 42.5 g 3    Glucose Blood (ONETOUCH ULTRA) In Vitro Strip 1 each by Other route in the morning and 1 each before bedtime. 200 strip 1    gabapentin 300 MG Oral Cap Take 1 capsule (300 mg total) by mouth in the morning, at noon, and at bedtime. 270 capsule 1    metFORMIN HCl 1000 MG Oral Tab Take 1 tablet (1,000 mg total) by mouth daily with breakfast AND 0.5 tablets (500 mg total) daily with dinner.      oxyCODONE-acetaminophen  MG Oral Tab Take 1 tablet by mouth every 8 (eight) hours as needed for Pain. No alcohol or driving on this med. Stop if lethargic or hallucinating.  Maximum 4000 mg Tylenol/acetaminophen daily from all sources.  Recall that each Percocet has 325 mg of Tylenol/acetaminophen in it. (Patient not taking: Reported on 8/20/2024) 25 tablet 0     No Known Allergies  Family History   Problem Relation Age of Onset    Diabetes Father     Diabetes Mother      No family Hx of DVT/PE    Social History     Occupational History    Not on file   Tobacco Use    Smoking status: Never     Passive exposure: Never    Smokeless tobacco: Never   Vaping Use    Vaping status: Never Used   Substance and Sexual Activity    Alcohol use: Not Currently    Drug use: Never    Sexual activity: Yes        Review of Systems:  GENERAL: feels generally well, no significant weight loss or weight gain  SKIN: no ulcerated or worrisome skin lesions  EYES:denies blurred vision or double vision  HEENT: denies new nasal congestion, sinus pain or ST  LUNGS: denies shortness of breath  CARDIOVASCULAR: denies chest pain  GI: no hematemesis, no worsening heartburn, no diarrhea  : no dysuria, no blood in urine, no difficulty urinating, no  incontinence  MUSCULOSKELETAL: no other musculoskeletal complaints other than in HPI  NEURO: no numbness or tingling, no weakness or balance disorder  PSYCHE: no depression or anxiety  HEMATOLOGIC: no hx of blood dyscrasia, no Hx DVT/PE  ENDOCRINE: no thyroid or diabetes issues  ALL/ASTHMA: no new hx of severe allergy or asthma    Physical Examination:    There were no vitals taken for this visit.  Constitutional: appears well hydrated, alert and responsive, no acute distress noted  Extremities: Lower extremity skin healthy, no pitting edema.  Right calf tender to palpation.  Incision healing well, staples removed today.  Musculoskeletal: Right knee has minimal asymmetric warmth and swelling, consistent with postoperative changes.  Right knee range of motion 3 to 110 degrees, no instability.  Patella tracking well.  Able to straight leg raise against resistance.  Able dorsiflex and plantarflex against resistance.  Neurological: Motor and sensory function intact.    Imaging: X-rays taken today in office show right knee implants are well-fixed and in proper alignment.      US VENOUS DOPPLER LEG RIGHT - DIAG IMG (CPT=93971)    Result Date: 8/10/2024  PROCEDURE: US VENOUS DOPPLER LEG RIGHT-DIAG IMG (CPT=93971)  COMPARISON: None.  INDICATIONS: Right calf pain and swelling  TECHNIQUE: Color duplex Doppler venous ultrasound of the right lower extremity was performed in the usual manner.  FINDINGS: The femoral and popliteal veins appear normal.  Normal flow was demonstrated with color and pulsed Doppler.  Visualized portions of the great and small saphenous, posterior tibial, and peroneal veins appear normal.   THROMBI: None visible. COMPRESSIBILITY: Normal. OTHER: Negative.         CONCLUSION:   No evidence of right lower extremity deep venous thrombosis.   A preliminary report was issued by the [email or web address] teleradiology service. There are no major discrepancies.  elm-remote  Dictated by (CST): Anmol Singh MD on  8/10/2024 at 10:25 AM     Finalized by (CST): Anmol Singh MD on 8/10/2024 at 10:26 AM          XR CHEST AP PORTABLE  (CPT=71045)    Result Date: 8/9/2024  PROCEDURE: XR CHEST AP PORTABLE  (CPT=71045) TIME: 2343 hours.   COMPARISON: None.  INDICATIONS: SOB post right total knee arthroplasty today.  TECHNIQUE:   Single view.   FINDINGS:  CARDIAC/VASC: Normal.  No cardiac silhouette abnormality or cardiomegaly.  Unremarkable pulmonary vasculature.  MEDIAST/NANDA:   No visible mass or adenopathy. LUNGS/PLEURA: Normal.  No significant pulmonary parenchymal abnormalities.  No effusion or pleural thickening. BONES: No fracture or visible bony lesion. OTHER: Negative.          CONCLUSION: No radiographic evidence of acute cardiopulmonary abnormality.  Preliminary report was given by [facility].  There are no clinically significant discrepancies.    Dictated by (CST): Lio López MD on 8/09/2024 at 8:46 AM     Finalized by (CST): Lio López MD on 8/09/2024 at 8:46 AM          XR KNEE (1 OR 2 VIEWS), RIGHT (CPT=73560)    Result Date: 8/8/2024  PROCEDURE: XR KNEE (1 OR 2 VIEWS), RIGHT (CPT=73560)  COMPARISON: [facility] 2nd Floor, XR KNEE (3 VIEWS), RIGHT (CPT=73562), 5/28/2024, 1:46 PM.  INDICATIONS: Status post right total knee arthroplasty.  TECHNIQUE: 2 views were obtained without weightbearing technique.          CONCLUSION:  1. Anatomically aligned right total knee arthroplasty.  No immediate complication.    Dictated by (CST): Min Lai MD on 8/08/2024 at 7:31 PM     Finalized by (CST): Min Lai MD on 8/08/2024 at 7:31 PM             Lab Results   Component Value Date    WBC 9.2 08/10/2024    HGB 11.1 (L) 08/10/2024    .0 08/10/2024      Lab Results   Component Value Date     (H) 08/10/2024    BUN 7 (L) 08/10/2024    CREATSERUM 0.50 (L) 08/10/2024        Assessment and Plan:  Diagnoses and all orders for this visit:    S/P TKR (total knee replacement) using  cement, right  -     Physical Therapy Referral - [facility]    Orthopedic aftercare  -     XR KNEE (3 VIEWS), RIGHT (CPT=73562); Future  -     Physical Therapy Referral - [facility]    Primary osteoarthritis of right knee  -     Physical Therapy Referral - [facility]        Assessment: As above    Plan: Discussed with the patient she should continue doing her home exercises and self rehab.  Gave her a prescription for outpatient physical therapy today.  For her right calf pain we will order an ultrasound to rule out DVT.  We will see the patient in 4 weeks for reevaluation.    Follow Up: No follow-ups on file.    GABRIEL Cevallos

## 2024-08-21 ENCOUNTER — HOSPITAL ENCOUNTER (OUTPATIENT)
Dept: ULTRASOUND IMAGING | Age: 59
Discharge: HOME | End: 2024-08-21
Payer: MEDICAID

## 2024-08-21 ENCOUNTER — TELEPHONE (OUTPATIENT)
Dept: ORTHOPEDICS CLINIC | Facility: CLINIC | Age: 59
End: 2024-08-21

## 2024-08-21 DIAGNOSIS — M79.661 PAIN AND SWELLING OF LOWER LEG, RIGHT: ICD-10-CM

## 2024-08-21 DIAGNOSIS — M79.89 PAIN AND SWELLING OF LOWER LEG, RIGHT: ICD-10-CM

## 2024-08-21 DIAGNOSIS — M17.11 PRIMARY OSTEOARTHRITIS OF RIGHT KNEE: ICD-10-CM

## 2024-08-21 DIAGNOSIS — Z96.651 S/P TKR (TOTAL KNEE REPLACEMENT) USING CEMENT, RIGHT: ICD-10-CM

## 2024-08-21 PROCEDURE — 93971 EXTREMITY STUDY: CPT

## 2024-08-22 ENCOUNTER — TELEPHONE (OUTPATIENT)
Dept: PHYSICAL THERAPY | Facility: HOSPITAL | Age: 59
End: 2024-08-22

## 2024-08-27 DIAGNOSIS — Z96.651 S/P TKR (TOTAL KNEE REPLACEMENT) USING CEMENT, RIGHT: ICD-10-CM

## 2024-08-27 DIAGNOSIS — M17.11 PRIMARY OSTEOARTHRITIS OF RIGHT KNEE: ICD-10-CM

## 2024-08-27 RX ORDER — OXYCODONE AND ACETAMINOPHEN 10; 325 MG/1; MG/1
1 TABLET ORAL EVERY 8 HOURS PRN
Qty: 25 TABLET | Refills: 0 | Status: SHIPPED | OUTPATIENT
Start: 2024-08-27

## 2024-08-28 ENCOUNTER — VIRTUAL PHONE E/M (OUTPATIENT)
Dept: UROLOGY | Facility: HOSPITAL | Age: 59
End: 2024-08-28
Attending: OBSTETRICS & GYNECOLOGY
Payer: MEDICAID

## 2024-08-28 DIAGNOSIS — N95.2 POST-MENOPAUSAL ATROPHIC VAGINITIS: ICD-10-CM

## 2024-08-28 DIAGNOSIS — N32.81 DETRUSOR INSTABILITY: ICD-10-CM

## 2024-08-28 DIAGNOSIS — N39.41 URGE INCONTINENCE: Primary | ICD-10-CM

## 2024-08-28 DIAGNOSIS — N81.84 PELVIC MUSCLE WASTING: ICD-10-CM

## 2024-08-28 RX ORDER — MIRABEGRON 50 MG/1
50 TABLET, EXTENDED RELEASE ORAL DAILY
Qty: 90 TABLET | Refills: 3 | Status: SHIPPED | OUTPATIENT
Start: 2024-08-28

## 2024-08-28 NOTE — PROGRESS NOTES
Pt presents w/ initial c/o UUI  Urodynamic testing undergone without complication.  Results reviewed with patient via telephone  This visit is being conducted as a televisit with pt's consent.  Pt in safe, private environment for televisit, provider located in the office setting    257/15cc & 310/1cc  [facility] 272cc  DO @117cc  No CHINTAN    Discussed with patient mgmt options for DO/uui  Biggest bother is sense of incomplete bladder emptying  Using vag estrogen cream    Discussed dietary and behavioral modifications for mgmt of urinary symptoms  Discussed weight management and benefits of weight loss on urinary symptoms  Reviewed AUA/SUFU guidelines on mgmt of non-neurogenic OAB  Discussed pharmacologic and nonpharmacologic mgmt options of urinary symptoms - reviewed risks, benefits, alternatives, and goals of treatment  Discussed specific risks related to OAB meds including, but not limited to dry mouth, constipation, blurry vision, cognitive changes, and BP elevation.    Discussed mgmt of vulvovaginal atrophy with vaginal estrogen cream. Reviewed associated benefits, risks, alternatives, and goals. Recommend low dose twice weekly mgmt     Bowel management reviewed. Discussed using fiber daily w/ addition of miralax as needed    All questions answered.  Pt will proceed mybetriq 50mg daily    Follow up 6 wks    Dr. Caroline Chung

## 2024-08-30 ENCOUNTER — TELEPHONE (OUTPATIENT)
Dept: UROLOGY | Facility: HOSPITAL | Age: 59
End: 2024-08-30

## 2024-08-30 NOTE — TELEPHONE ENCOUNTER
Incoming TC from patient's daughter, Sharri, regarding newly rx'd Mirabegron. Patient's daughter indicates the medication is not covered by insurance. Requesting an alternative medication. VM left on nursing line.     Outgoing TC to patient's daughter, Sharri, returning previous VM. No answer. VM left. Instructed Sharri to call office back to discuss alternative OAB medication options. Office number provided.    Incoming TC from patient's daughter. Instructed Sharri to call her mother's pharmacy for additional medication details as well as call her insurance to confirm what OAB medications are covered. Instructed to call us back with an update. Sharri understands and agrees to plan.

## 2024-09-05 ENCOUNTER — OFFICE VISIT (OUTPATIENT)
Dept: PHYSICAL THERAPY | Facility: HOSPITAL | Age: 59
End: 2024-09-05
Payer: MEDICAID

## 2024-09-05 DIAGNOSIS — Z47.89 ORTHOPEDIC AFTERCARE: Primary | ICD-10-CM

## 2024-09-05 DIAGNOSIS — M17.11 PRIMARY OSTEOARTHRITIS OF RIGHT KNEE: ICD-10-CM

## 2024-09-05 DIAGNOSIS — Z96.651 S/P TKR (TOTAL KNEE REPLACEMENT) USING CEMENT, RIGHT: ICD-10-CM

## 2024-09-05 PROCEDURE — 97110 THERAPEUTIC EXERCISES: CPT | Performed by: PHYSICAL THERAPIST

## 2024-09-05 PROCEDURE — 97140 MANUAL THERAPY 1/> REGIONS: CPT | Performed by: PHYSICAL THERAPIST

## 2024-09-05 PROCEDURE — 97161 PT EVAL LOW COMPLEX 20 MIN: CPT | Performed by: PHYSICAL THERAPIST

## 2024-09-05 NOTE — PROGRESS NOTES
POST-OP KNEE EVALUATION:     Diagnosis:   Orthopedic aftercare (Z47.89)  S/P TKR (total knee replacement) using cement, right   (Z96.651)  Primary osteoarthritis of right knee (M17.11)      Referring Provider: Shameka Phipps  Date of Evaluation:    9/5/2024    Precautions: diabetes Next MD visit:   None scheduled   Date of Surgery: 8/8/24     PATIENT SUMMARY   Matilde Ferrera is a 59 year old female who presents to therapy today s/p R TKA on 8/8/24 with complaints of R knee pain along with numbness in her medial ankle and incisional scars. She is Burkinan speaking so [employer] phone  was used. She also had L TKA performed in 11/3/23. Bending and sitting for prolonged periods of time makes her pain worse while taking pain meds makes her pain better.   Pt describes pain level current 6/10, at best 5/10, at worst 10/10.   Current functional limitations include difficulty sitting, ambulating, bending, squatting, and negotiating stairs.     Matilde describes prior level of function as independent. Pt goals include sitting, ambulating, bending, squatting, and negotiating stairs with less pain and difficulty.  Past medical history was reviewed with Matilde. She has a past medical history of Diabetes ([facility]), Diabetes mellitus ([facility]), Esophageal reflux, Hyperlipidemia (8/8/2024), Migraines, Osteoarthritis, and Visual impairment.     ASSESSMENT  Matilde presents to physical therapy evaluation with primary c/o R knee pain. The results of the objective tests and measures show decreased knee ROM, decreased hip, knee, and ankle strength, impaired posture, gait, and balance, decreased hamstring flexibility, and hypomobility in the patella.  Functional deficits include but are not limited to difficulty sitting, ambulating, bending, squatting, and negotiating stairs.  Signs and symptoms are consistent with diagnosis of s/p R TKA. Pt and PT discussed evaluation findings, pathology, POC and HEP.  Pt voiced understanding and performs HEP  correctly without reported pain. Skilled Physical Therapy is medically necessary to address the above impairments and reach functional goals.     OBJECTIVE:   Observation/Skin: Will be assess next session   Palpation: TTP in the R knee   Sensation: bilat light touch is intact     AROM: (* denotes performed with pain)   Knee    Flexion: R 128*; L 110   Extension: R -15*; L -5       Patellar Mobility/Accessory motion: Hypomobility with patella mob    Flexibility:   Hamstrings: R Mod restrictions; L WFL    Strength/MMT: (* denotes performed with pain)  Hip Knee   Flexion: R 4/5; L 4/5  Extension: R 3/5; L 3/5  Abduction: R 3/5; L 3/5  ER: R 3/5; L 3/5 Flexion: R 3/5; L 5/5  Extension: R 4/5; L 5/5        Balance: SLS: R 3 sec, L 5 sec    Gait: pt ambulates on level ground with assistive device of walker .     Today’s Treatment and Response:   Pt education was provided on exam findings, treatment diagnosis, treatment plan, expectations, and prognosis. Pt was also provided recommendations for activity modifications. Patient was instructed in and issued a HEP for: heel slides, gastroc stretch, hamstring stretch, quad sets   TE:   heel slides [id number] R  gastroc stretch 3x30s R  hamstring stretch 3x30s R  quad sets [id number] R  MT: STM in the R quad; PF joint mob all directions & TF anterior and posterior mob       Charges: PT Eval Low Complexity, 1 TE, & 1 MT      Total Timed Treatment: 30 min     Total Treatment Time: 45 min     Based on clinical rationale and outcome measures, this evaluation involved Low Complexity decision making due to 1-2 personal factors/comorbidities, 3 body structures involved/activity limitations, and evolving symptoms including changing pain levels.  PLAN OF CARE:    Goals: (To be met in 8 visits)   Pt will improve knee extension ROM to 0 deg to allow proper heel strike during gait and terminal knee extension in stance  Pt will improve knee AROM flexion to >128 degrees to improve ability to  perform bending  Pt will improve quad strength to 5/5 to ascend 1 flight of stairs reciprocally without UE assist  Pt will increase hip and knee strength to grossly 4+/5 to be able to get up and down from the floor safely  Pt will demonstrate increased hip ER/ABD strength to 4/5 to perform stepping and squatting activities without excessive femoral IR/ADD  Pt will improve SLS to >5s to improve safety and independence with gait on uneven surfaces such as grass  Pt will be independent and compliant with comprehensive HEP to maintain progress achieved in PT    Frequency / Duration: Patient will be seen for 2 x/week or a total of 8 visits over a 90 day period.  Treatment will include: Gait training, Manual Therapy, Neuromuscular Re-education, Therapeutic Activities, Therapeutic Exercise, and Home Exercise Program instruction    Education or treatment limitation: None  Rehab Potential:good    LEFS Score  LEFS Score: 5 % (11/24/2023 11:13 AM)      Patient/Family/Caregiver was advised of these findings, precautions, and treatment options and has agreed to actively participate in planning and for this course of care.    Thank you for your referral. Please co-sign or sign and return this letter via fax as soon as possible to [phone]. If you have any questions, please contact me at Dept: [phone]    Sincerely,  Electronically signed by therapist: Mike Floyd, PT  Physician's certification required: Yes  I certify the need for these services furnished under this plan of treatment and while under my care.    X___________________________________________________ Date____________________    Certification From: 9/5/2024  To:12/4/2024

## 2024-09-06 DIAGNOSIS — Z96.651 STATUS POST TOTAL RIGHT KNEE REPLACEMENT: ICD-10-CM

## 2024-09-06 DIAGNOSIS — M17.11 PRIMARY OSTEOARTHRITIS OF RIGHT KNEE: ICD-10-CM

## 2024-09-06 RX ORDER — PSEUDOEPHEDRINE HCL 30 MG
100 TABLET ORAL 2 TIMES DAILY
Qty: 20 CAPSULE | Refills: 0 | Status: SHIPPED | OUTPATIENT
Start: 2024-09-06

## 2024-09-06 NOTE — TELEPHONE ENCOUNTER
From: Matilde Ferrera  To: Office of Shameka Phipps  Sent: 8/27/2024 11:20 AM CDT  Subject: Medication Renewal Request    Refills have been requested for the following medications:     docusate sodium 100 MG Oral Cap [Shameka Phipps]    Preferred pharmacy: [id number] [address] AT [address] [facility] [address], [phone], [phone]      Medication renewals requested in this message routed separately:     oxyCODONE-acetaminophen  MG Oral Tab [Dustin Mclean]   Patient Comment: as need it for pain.

## 2024-09-06 NOTE — TELEPHONE ENCOUNTER
Rx request, please review and sign off if appropriate. Thank you.    Last seen: 8/20/24  Last refill: 8/9/24 #20 with 0 refills.

## 2024-09-09 NOTE — PROGRESS NOTES
Diagnosis:   Orthopedic aftercare (Z47.89)  S/P TKR (total knee replacement) using cement, right   (Z96.651)  Primary osteoarthritis of right knee (M17.11)      Referring Provider: Shameka Phipps  Date of Evaluation:    9/5/2024    Precautions: diabetes Next MD visit:   None scheduled   Date of Surgery: 8/8/24     Insurance Primary/Secondary: BLUE CROSS MEDICAID / N/A     # Auth Visits: 15v (exp 11/4/24)            Subjective: Pt says the knee feels better than the other surgery. Pt still has the paresthesias down the left leg, like it's itching at times. The right knee also feels itching sometimes.  Language line solution: Renae [id number]    Pain: 5/10 R knee      Objective: see rx flowsheet  9/10/24:   Observation: healed, clean incision; 2 small dry scabs/eschar at most proximal incision point, 2 very small scabs at the distal point of the incision  Girth: L 36cm; L 40.5 cm (at knee jt line)   Palpation: 2-3+ pitting edema at the anterior knee joint (with patellar mobs), increased warmth  Gait mechanics: can ambulate without an AD with good mechanics, mild antalgia and trendelenburg during R stance    R knee passive ROM:  Flexion: 110  Extension: -8 resting; -5 passively    Strength/MMT: (* denotes performed with pain)  Hip Knee   IE  Flexion: R 4/5; L 4/5  Extension: R 3/5; L 3/5  Abduction: R 3/5; L 3/5  ER: R 3/5; L 3/5 IE  Flexion: R 3/5; L 5/5  Extension: R 4/5; L 5/5        IE Balance: SLS: R 3 sec, L 5 sec    Assessment: R knee PROM -5 to 110 today. Cued pt for isolated quad activation, as pt was compensating with glute activation and IR. Pt was fatigued by the end of the session but was able to perform all planned interventions without complaints.       Goals:   Pt will improve knee extension ROM to 0 deg to allow proper heel strike during gait and terminal knee extension in stance  Pt will improve knee AROM flexion to >128 degrees to improve ability to perform bending  Pt will improve quad strength to 5/5  to ascend 1 flight of stairs reciprocally without UE assist  Pt will increase hip and knee strength to grossly 4+/5 to be able to get up and down from the floor safely  Pt will demonstrate increased hip ER/ABD strength to 4/5 to perform stepping and squatting activities without excessive femoral IR/ADD  Pt will improve SLS to >5s to improve safety and independence with gait on uneven surfaces such as grass  Pt will be independent and compliant with comprehensive HEP to maintain progress achieved in PT    Plan: Patient will be seen for 2 x/week or a total of 8 visits over a 90 day period.  Treatment will include: Gait training, Manual Therapy, Neuromuscular Re-education, Therapeutic Activities, Therapeutic Exercise, and Home Exercise Program instruction  Progress range of motion, patellar mobility, quad activation  Date: 9/10/2024  TX#: 2/8 Date:                 TX#: 3/ Date:                 TX#: 4/ Date:                 TX#: 5/ Date:   Tx#: 6/   TE: x15'  Objective assessment  heel slides with green strap x20 R  Bridge x15 (feet slightly staggered due to limitations in ROM)   Seated HS stretch 5x10\" holds R    NMR x10'  QS 5 sec hold x20 R  SAQ 2x10 R  Gait mechanics with and without 2ww    MT: x15'  Supine patellar glides all direction GII-III R  Hook lying tib-fem glides AP and PA GII-III R  Tibial ER and IR R  Gentle over pressure into flexion and extension R knee  Gentle scar mobility       HEP: heel slides, gastroc stretch, hamstring stretch, quad sets     Charges: 1 TE 1 NMR 1 MT       Total Timed Treatment: 40 min  Total Treatment Time: 40 min

## 2024-09-10 ENCOUNTER — OFFICE VISIT (OUTPATIENT)
Dept: PHYSICAL THERAPY | Facility: HOSPITAL | Age: 59
End: 2024-09-10
Payer: MEDICAID

## 2024-09-10 ENCOUNTER — TELEPHONE (OUTPATIENT)
Dept: PHYSICAL THERAPY | Facility: HOSPITAL | Age: 59
End: 2024-09-10

## 2024-09-10 PROCEDURE — 97112 NEUROMUSCULAR REEDUCATION: CPT

## 2024-09-10 PROCEDURE — 97140 MANUAL THERAPY 1/> REGIONS: CPT

## 2024-09-10 PROCEDURE — 97110 THERAPEUTIC EXERCISES: CPT

## 2024-09-12 NOTE — PROGRESS NOTES
Diagnosis:   Orthopedic aftercare (Z47.89)  S/P TKR (total knee replacement) using cement, right   (Z96.651)  Primary osteoarthritis of right knee (M17.11)      Referring Provider: Shameka Phipps  Date of Evaluation:    9/5/2024    Precautions: diabetes Next MD visit:   None scheduled   Date of Surgery: 8/8/24     Insurance Primary/Secondary: BLUE CROSS MEDICAID / N/A     # Auth Visits: 15v (exp 11/4/24)            Subjective: Pt feels pain along the R buttock down the back of the thigh to the knee. Doing exercises and walking makes it worse. Morning is also worse.   Language line solution: [id number] Marina    Pain: 7/10 R knee      Objective: see rx flowsheet  9/13/24  R knee flexion PROM: 118  R knee extension PROM: -3      9/10/24:   Observation: healed, clean incision; 2 small dry scabs/eschar at most proximal incision point, 2 very small scabs at the distal point of the incision  Girth: L 36cm; L 40.5 cm (at knee jt line)   Palpation: 2-3+ pitting edema at the anterior knee joint (with patellar mobs), increased warmth  Gait mechanics: can ambulate without an AD with good mechanics, mild antalgia and trendelenburg during R stance    R knee passive ROM:  Flexion: 110  Extension: -8 resting; -5 passively    Strength/MMT: (* denotes performed with pain)  Hip Knee   IE  Flexion: R 4/5; L 4/5  Extension: R 3/5; L 3/5  Abduction: R 3/5; L 3/5  ER: R 3/5; L 3/5 IE  Flexion: R 3/5; L 5/5  Extension: R 4/5; L 5/5        IE Balance: SLS: R 3 sec, L 5 sec      Assessment: R knee PROM -3 to 118 today. Noted increased tension especially at the HS origin, pt was tearful with TPR but did report improved symptoms after STM. Progressed LE strengthening exercises, she reported increased tightness and soreness in the right leg, but denied worsening pain.      Goals:   Pt will improve knee extension ROM to 0 deg to allow proper heel strike during gait and terminal knee extension in stance  Pt will improve knee AROM flexion to >128  degrees to improve ability to perform bending  Pt will improve quad strength to 5/5 to ascend 1 flight of stairs reciprocally without UE assist  Pt will increase hip and knee strength to grossly 4+/5 to be able to get up and down from the floor safely  Pt will demonstrate increased hip ER/ABD strength to 4/5 to perform stepping and squatting activities without excessive femoral IR/ADD  Pt will improve SLS to >5s to improve safety and independence with gait on uneven surfaces such as grass  Pt will be independent and compliant with comprehensive HEP to maintain progress achieved in PT    Plan: Patient will be seen for 2 x/week or a total of 8 visits over a 90 day period.  Treatment will include: Gait training, Manual Therapy, Neuromuscular Re-education, Therapeutic Activities, Therapeutic Exercise, and Home Exercise Program instruction  Progress range of motion, patellar mobility, quad activation  Date: 9/10/2024  TX#: 2/8 Date:  9/13/24               TX#: 3/8 Date:                 TX#: 4/ Date:                 TX#: 5/ Date:   Tx#: 6/   TE: x15'  Objective assessment  heel slides with green strap x20 R  Bridge x15 (feet slightly staggered due to limitations in ROM)   Seated HS stretch 5x10\" holds R    NMR x10'  QS 5 sec hold x20 R  SAQ 2x10 R  Gait mechanics with and without 2ww    MT: x15'  Supine patellar glides all direction GII-III R  Hook lying tib-fem glides AP and PA GII-III R  Tibial ER and IR R  Gentle over pressure into flexion and extension R knee  Gentle scar mobility TE: x15'  Seated HS stretching on table R  Supine calf stretch with green strap 5x10-15\" holds R  heel slides with green strap x20 R  Supine HS stretch with green strap 5x10\" R  Heel raises 2x10   Slantboard stretch x2'    NMR x10'  STS x15  SLS with hip ext with glider at ballet bar x10 bilat  4\" step up 2x10 R    MT: x15'  Supine patellar glides all direction GII-III R  Hook lying tib-fem glides AP and PA GII-III R  Tibial ER and IR  R  Gentle over pressure into flexion and extension R knee  Gentle scar mobility  STM R HS       HEP: heel slides, gastroc stretch, hamstring stretch, quad sets     Charges: 1 TE 1 NMR 1 MT       Total Timed Treatment: 40 min  Total Treatment Time: 40 min

## 2024-09-13 ENCOUNTER — OFFICE VISIT (OUTPATIENT)
Dept: PHYSICAL THERAPY | Facility: HOSPITAL | Age: 59
End: 2024-09-13
Attending: ORTHOPAEDIC SURGERY
Payer: MEDICAID

## 2024-09-13 PROCEDURE — 97112 NEUROMUSCULAR REEDUCATION: CPT

## 2024-09-13 PROCEDURE — 97140 MANUAL THERAPY 1/> REGIONS: CPT

## 2024-09-13 PROCEDURE — 97110 THERAPEUTIC EXERCISES: CPT

## 2024-09-13 NOTE — PROGRESS NOTES
Diagnosis:   Orthopedic aftercare (Z47.89)  S/P TKR (total knee replacement) using cement, right   (Z96.651)  Primary osteoarthritis of right knee (M17.11)      Referring Provider: Shameka Phipps  Date of Evaluation:    9/5/2024    Precautions: diabetes Next MD visit:   None scheduled   Date of Surgery: 8/8/24     Insurance Primary/Secondary: BLUE CROSS MEDICAID / N/A     # Auth Visits: 15v (exp 11/4/24)            Subjective: Pt feels good today. Took a pain pill before she came to PT today.  Language line solution: ID# [id number] Lexy (call failed with 4 minutes remaining in session)    Pain:  0/10 R knee      Objective: see rx flowsheet  9/16/24  R knee flexion PROM: 120   R knee extension: -1 (-4 at rest)    9/13/24  R knee flexion PROM: 118  R knee extension PROM: -3    9/10/24:   Observation: healed, clean incision; 2 small dry scabs/eschar at most proximal incision point, 2 very small scabs at the distal point of the incision  Girth: L 36cm; L 40.5 cm (at knee jt line)   Palpation: 2-3+ pitting edema at the anterior knee joint (with patellar mobs), increased warmth  Gait mechanics: can ambulate without an AD with good mechanics, mild antalgia and trendelenburg during R stance    R knee passive ROM:  Flexion: 110  Extension: -8 resting; -5 passively    Strength/MMT: (* denotes performed with pain)  Hip Knee   IE  Flexion: R 4/5; L 4/5  Extension: R 3/5; L 3/5  Abduction: R 3/5; L 3/5  ER: R 3/5; L 3/5 IE  Flexion: R 3/5; L 5/5  Extension: R 4/5; L 5/5        IE Balance: SLS: R 3 sec, L 5 sec      Assessment: R knee PROM -1 to 120 today. Active right knee flexion remains limited and she lacks full knee extension. Good tolerance to new exercises. Will need to focus on knee extension and gait mechanics next session.       Goals:   Pt will improve knee extension ROM to 0 deg to allow proper heel strike during gait and terminal knee extension in stance  Pt will improve knee AROM flexion to >128 degrees to improve  ability to perform bending  Pt will improve quad strength to 5/5 to ascend 1 flight of stairs reciprocally without UE assist  Pt will increase hip and knee strength to grossly 4+/5 to be able to get up and down from the floor safely  Pt will demonstrate increased hip ER/ABD strength to 4/5 to perform stepping and squatting activities without excessive femoral IR/ADD  Pt will improve SLS to >5s to improve safety and independence with gait on uneven surfaces such as grass  Pt will be independent and compliant with comprehensive HEP to maintain progress achieved in PT    Plan: Patient will be seen for 2 x/week or a total of 8 visits over a 90 day period.  Treatment will include: Gait training, Manual Therapy, Neuromuscular Re-education, Therapeutic Activities, Therapeutic Exercise, and Home Exercise Program instruction  Progress range of motion, patellar mobility, quad activation; hurdles 6\" when able, gait mechanics, stair negotiation   Date: 9/10/2024  TX#: 2/8 Date:  9/13/24               TX#: 3/8 Date: 9/16/24                TX#: 4/8 Date:                 TX#: 5/ Date:   Tx#: 6/   TE: x15'  Objective assessment  heel slides with green strap x20 R  Bridge x15 (feet slightly staggered due to limitations in ROM)   Seated HS stretch 5x10\" holds R    NMR x10'  QS 5 sec hold x20 R  SAQ 2x10 R  Gait mechanics with and without 2ww    MT: x15'  Supine patellar glides all direction GII-III R  Hook lying tib-fem glides AP and PA GII-III R  Tibial ER and IR R  Gentle over pressure into flexion and extension R knee  Gentle scar mobility TE: x15'  Seated HS stretching on table R  Supine calf stretch with green strap 5x10-15\" holds R  heel slides with green strap x20 R  Supine HS stretch with green strap 5x10\" R  Heel raises 2x10   Slantboard stretch x2'    NMR x10'  STS x15  SLS with hip ext with glider at ballet bar x10 bilat  4\" step up 2x10 R    MT: x15'  Supine patellar glides all direction GII-III R  Hook lying tib-fem  glides AP and PA GII-III R  Tibial ER and IR R  Gentle over pressure into flexion and extension R knee  Gentle scar mobility  STM R HS  TE: x17'  Supine calf stretch with green strap 5x10\" holds R  Supine HS stretch with green strap 5x10\" R  Heel slides with green strap x20 R  Bridge 2x10  Prone knee hang 2# x3'  SL shuttle leg press 42# x10, 55# x24 R   Slantboard stretch x2'    NMR: x15'  SLR with QS x10 R  SL hip abduction x10 R  SL clam x15 R  Standing TKE with ball on wall 3\" hold x15 R  6\" step up 2x10 R    MT: x10'  Supine patellar glides all direction GII-III R  Hook lying tib-fem glides AP GII-III R  Tibial ER and IR R  Gentle over pressure into flexion and extension R knee     HEP: heel slides, gastroc stretch, hamstring stretch, quad sets     Charges: 1 TE 1 NMR 1 MT       Total Timed Treatment: 42 min  Total Treatment Time: 42 min

## 2024-09-16 ENCOUNTER — OFFICE VISIT (OUTPATIENT)
Dept: PHYSICAL THERAPY | Facility: HOSPITAL | Age: 59
End: 2024-09-16
Payer: MEDICAID

## 2024-09-16 PROCEDURE — 97112 NEUROMUSCULAR REEDUCATION: CPT

## 2024-09-16 PROCEDURE — 97110 THERAPEUTIC EXERCISES: CPT

## 2024-09-16 PROCEDURE — 97140 MANUAL THERAPY 1/> REGIONS: CPT

## 2024-09-18 ENCOUNTER — OFFICE VISIT (OUTPATIENT)
Dept: PHYSICAL THERAPY | Facility: HOSPITAL | Age: 59
End: 2024-09-18
Payer: MEDICAID

## 2024-09-18 PROCEDURE — 97140 MANUAL THERAPY 1/> REGIONS: CPT | Performed by: PHYSICAL THERAPIST

## 2024-09-18 PROCEDURE — 97112 NEUROMUSCULAR REEDUCATION: CPT | Performed by: PHYSICAL THERAPIST

## 2024-09-18 PROCEDURE — 97110 THERAPEUTIC EXERCISES: CPT | Performed by: PHYSICAL THERAPIST

## 2024-09-18 NOTE — PROGRESS NOTES
Diagnosis:   Orthopedic aftercare (Z47.89)  S/P TKR (total knee replacement) using cement, right   (Z96.651)  Primary osteoarthritis of right knee (M17.11)      Referring Provider: Shameka Phipps  Date of Evaluation:    9/5/2024    Precautions: diabetes Next MD visit:   None scheduled   Date of Surgery: 8/8/24     Insurance Primary/Secondary: BLUE CROSS MEDICAID / N/A     # Auth Visits: 15v (exp 11/4/24)            Subjective: Patient is Hebrew speaking so language line solution was used. She reports having some pain today.      Pain:  5-6/10 R knee      Objective: see rx flowsheet    9/18/24  R knee flexion PROM: 120 with heel slides   R knee extension: -4    9/16/24  R knee flexion PROM: 120   R knee extension: -1 (-4 at rest)    9/13/24  R knee flexion PROM: 118  R knee extension PROM: -3    9/10/24:   Observation: healed, clean incision; 2 small dry scabs/eschar at most proximal incision point, 2 very small scabs at the distal point of the incision  Girth: L 36cm; L 40.5 cm (at knee jt line)   Palpation: 2-3+ pitting edema at the anterior knee joint (with patellar mobs), increased warmth  Gait mechanics: can ambulate without an AD with good mechanics, mild antalgia and trendelenburg during R stance    R knee passive ROM:  Flexion: 110  Extension: -8 resting; -5 passively    Strength/MMT: (* denotes performed with pain)  Hip Knee   IE  Flexion: R 4/5; L 4/5  Extension: R 3/5; L 3/5  Abduction: R 3/5; L 3/5  ER: R 3/5; L 3/5 IE  Flexion: R 3/5; L 5/5  Extension: R 4/5; L 5/5        IE Balance: SLS: R 3 sec, L 5 sec      Assessment: Patient is having increased stiffness and tightness in her R knee today. New exercises were added today to improve her symptoms. Extensor lag was also noticeable so occasional verbal and manual cueing was required to improve quad activation. She tolerated well with all exercises without any significant increase in pain.      Goals:   Pt will improve knee extension ROM to 0 deg to allow  proper heel strike during gait and terminal knee extension in stance  Pt will improve knee AROM flexion to >128 degrees to improve ability to perform bending  Pt will improve quad strength to 5/5 to ascend 1 flight of stairs reciprocally without UE assist  Pt will increase hip and knee strength to grossly 4+/5 to be able to get up and down from the floor safely  Pt will demonstrate increased hip ER/ABD strength to 4/5 to perform stepping and squatting activities without excessive femoral IR/ADD  Pt will improve SLS to >5s to improve safety and independence with gait on uneven surfaces such as grass  Pt will be independent and compliant with comprehensive HEP to maintain progress achieved in PT    Plan: Patient will be seen for 2 x/week or a total of 8 visits over a 90 day period.  Treatment will include: Gait training, Manual Therapy, Neuromuscular Re-education, Therapeutic Activities, Therapeutic Exercise, and Home Exercise Program instruction  Progress range of motion, patellar mobility, quad activation; hurdles 6\" when able, gait mechanics, stair negotiation   Date: 9/10/2024  TX#: 2/8 Date:  9/13/24               TX#: 3/8 Date: 9/16/24                TX#: 4/8 Date: 9/18/24             TX#: 5/8 Date:   Tx#: 6/   TE: x15'  Objective assessment  heel slides with green strap x20 R  Bridge x15 (feet slightly staggered due to limitations in ROM)   Seated HS stretch 5x10\" holds R    NMR x10'  QS 5 sec hold x20 R  SAQ 2x10 R  Gait mechanics with and without 2ww    MT: x15'  Supine patellar glides all direction GII-III R  Hook lying tib-fem glides AP and PA GII-III R  Tibial ER and IR R  Gentle over pressure into flexion and extension R knee  Gentle scar mobility TE: x15'  Seated HS stretching on table R  Supine calf stretch with green strap 5x10-15\" holds R  heel slides with green strap x20 R  Supine HS stretch with green strap 5x10\" R  Heel raises 2x10   Slantboard stretch x2'    NMR x10'  STS x15  SLS with hip ext  with glider at ballet bar x10 bilat  4\" step up 2x10 R    MT: x15'  Supine patellar glides all direction GII-III R  Hook lying tib-fem glides AP and PA GII-III R  Tibial ER and IR R  Gentle over pressure into flexion and extension R knee  Gentle scar mobility  STM R HS  TE: x17'  Supine calf stretch with green strap 5x10\" holds R  Supine HS stretch with green strap 5x10\" R  Heel slides with green strap x20 R  Bridge 2x10  Prone knee hang 2# x3'  SL shuttle leg press 42# x10, 55# x24 R   Slantboard stretch x2'    NMR: x15'  SLR with QS x10 R  SL hip abduction x10 R  SL clam x15 R  Standing TKE with ball on wall 3\" hold x15 R  6\" step up 2x10 R    MT: x10'  Supine patellar glides all direction GII-III R  Hook lying tib-fem glides AP GII-III R  Tibial ER and IR R  Gentle over pressure into flexion and extension R knee TE: x20'   Supine BKTC with swiss ball 3x10   Supine HS stretch with green strap 3x30s R  Heel slides with green strap 3x10 R  Prone knee hang 2# x3'  SL shuttle leg press 62# 3x10 R   Slantboard stretch 3x30s   Standing on 6 in step knee flexion AAROM 3x10     NMR: x15'  SLR with QS x10 R  SL hip abduction x10 R  SL clam x15 R  Standing TKE with ball on wall 3\" hold x15 R  6\" step up 2x10 R    MT: x10'  Supine patellar glides all direction GII-III R  Hook lying tib-fem glides AP GII-III R  Tibial ER and IR R  Gentle over pressure into flexion and extension R knee    HEP: heel slides, gastroc stretch, hamstring stretch, quad sets     Charges: 1 TE 1 NMR 1 MT       Total Timed Treatment: 45 min  Total Treatment Time: 45 min

## 2024-09-19 ENCOUNTER — OFFICE VISIT (OUTPATIENT)
Dept: ENDOCRINOLOGY CLINIC | Facility: CLINIC | Age: 59
End: 2024-09-19
Payer: MEDICAID

## 2024-09-19 VITALS
BODY MASS INDEX: 32.12 KG/M2 | HEART RATE: 81 BPM | SYSTOLIC BLOOD PRESSURE: 100 MMHG | WEIGHT: 153 LBS | HEIGHT: 58 IN | DIASTOLIC BLOOD PRESSURE: 63 MMHG

## 2024-09-19 DIAGNOSIS — E11.9 TYPE 2 DIABETES MELLITUS WITHOUT COMPLICATION, WITHOUT LONG-TERM CURRENT USE OF INSULIN (HCC): Primary | ICD-10-CM

## 2024-09-19 LAB
CARTRIDGE EXPIRATION DATE: NORMAL DATE
GLUCOSE BLOOD: 112
HEMOGLOBIN A1C: 5.6 % (ref 4.3–5.6)
TEST STRIP EXPIRATION DATE: NORMAL DATE
TEST STRIP LOT #: NORMAL NUMERIC

## 2024-09-19 PROCEDURE — 82947 ASSAY GLUCOSE BLOOD QUANT: CPT | Performed by: NURSE PRACTITIONER

## 2024-09-19 PROCEDURE — 99213 OFFICE O/P EST LOW 20 MIN: CPT | Performed by: NURSE PRACTITIONER

## 2024-09-19 PROCEDURE — 83036 HEMOGLOBIN GLYCOSYLATED A1C: CPT | Performed by: NURSE PRACTITIONER

## 2024-09-19 RX ORDER — DULAGLUTIDE 4.5 MG/.5ML
INJECTION, SOLUTION SUBCUTANEOUS
COMMUNITY

## 2024-09-19 NOTE — PATIENT INSTRUCTIONS
A1C: 5,6% hoy --> disminuyó desde 6,1% el 26/07/2024  Glucosa en jemima: 112 en la clínica hoy    Medicamentos:   - disminuir Metformina 1000--> 500 mg con el desayuno           500 mg con la jaziel    - continuar con Trulicity 4,5 mg brian vez a la semana       - continuar siguiendo brian dieta baja en carbohidratos   - continuar activo teresa lo hace actualmente       Peso:  Lecturas de peso de los últimos 6 encuentros:  19/09/24 153 libras (69,4 kg)  08/08/24 155 libras 14,4 onzas (70,7 kg)  05/08/24 158 libras (71,7 kg)  10/07/24 158 libras (71,7 kg)  16/05/24 158 libras 4 onzas (71,8 kg)  06/05/24 157 libras (71,2 kg)    Objetivo A1C:  <7,0%    Pruebas de azúcar en jemima:  Pruebe hinds nivel de azúcar en la jemima 1 vez al día   Horarios recomendados para realizar la prueba: modificar antes del desayuno (en ayunas) o 2 horas después de las comidas    Objetivos de azúcar en jemima:  Antes del desayuno:  (preferiblemente < 110)  2 horas después de las comidas: <150     Llame para niveles de azúcar en jemima persistentes < 75 o > 200

## 2024-09-19 NOTE — PROGRESS NOTES
: Min  ID# [id number]    Name: Matilde Ferrera  Date: 2024    CHIEF COMPLAINT   Chief Complaint   Patient presents with    Diabetes     A1C check   HISTORY OF PRESENT ILLNESS   Matilde Ferrera is a 59 year old female who presents for follow up on diabetes management.   HbA1C: 5.6% at POC today. Previously was 6.1% on 2024  Blood glucose:  112 in clinic today.   Patient notes that he has been feeling well. She had right knee surgery 2024 with Dr. Pham. Has noted that she is moving better and is participating in PT.   She continues to follow a low carb diet and denies any changes to activity. She continues to be compliant with all diabetes medications.     FAMILY HISTORY OF DIABETES  -mother   DIABETES HISTORY  Diagnosed: around 7 years ago   Prior HbA, C or glycohemoglobin were 9.4% 2023; 6.5% at POC today; 6.6% 11/10/2023; 6.0% on 4/3/2024; 6.1% on 2024; 5.6% at POC today;     Patient has not had hospitalizations for blood sugar issues.   Denies any history of pancreatitis.     PREVIOUS MEDICATION FOR DM:  -Glipizide -d/c'ed due to hypoglycemia   - Jardiance - d/c'ed due to intolerance to s/e (yeast infection)     CURRENT MEDICATIONS FOR DM:  - Metformin 1,000mg with breakfast    1,000mg with dinner -- has been taking 500mg dose  - Trulicity 4.5mg once weekly - tolerating well; denies GI s/e     HOME GLUCOSE READINGS:   Testing BG x 1 daily  Meter or log book today: no   Fastin at once occurring; usually 105-110  After lunch: 130s   Hypoglycemia: no - denies having symptoms of hypoglycemia/hyperglycemia     HISTORY OF DIABETES COMPLICATIONS:  History of Retinopathy: no - last eye exam within the last 12 months: yes - last exam was on 10/23/2023 with Dr. Ocampo   History of Neuropathy: yes - intermittently occurring   History of Nephropathy: no     ASSOCIATED COMPLICATIONS:   HTN: no   Hyperlipidemia: no   Cardiovascular Disease: no   Peripheral Vascular Disease: no      DIETARY COMPLIANCE:  Good; has been working on eating a low carb diet     EXERCISE:   Yes  - participating in PT    Polyuria, polyphagia, polydipsia: no   Paresthesias: yes - intermittent   Blurred vision:  no   Recent steroids, illness or infections: no     REVIEW OF SYSTEMS  Constitutional: Negative for: weight change, fever, fatigue, cold/heat intolerance  Eyes: Negative for:  Visual changes, proptosis, blurring  ENT: Negative for:  dysphagia, neck swelling, dysphonia  Respiratory: Negative for: hemoptysis, shortness of breath, cough, or dyspnea.  Cardiovascular: Negative for:  chest pain, chest discomfort, palpitations  GI: Negative for:  abdominal pain, nausea, vomiting, diarrhea, heartburn, constipation  Neurology: Negative for: headache, dizziness, syncope, numbness/tingling, or weakness.   Genito-Urinary: Negative for: dysuria, frequency or hematuria   Hematology/Lymphatics: Negative for: bruising, easy bleeding, lower extremity edema  Skin: Negative for: rash, blister, infection or ulcers.  Endocrine: Negative for: polyuria, polydipsia. No osteoporosis. No thyroid disease.     MEDICATIONS:     Current Outpatient Medications:     docusate sodium 100 MG Oral Cap, Take 100 mg by mouth 2 (two) times daily. Do not crush. Stop if loose stool, Disp: 20 capsule, Rfl: 0    Mirabegron ER (MYRBETRIQ) 50 MG Oral Tablet 24 Hr, Take 1 tablet (50 mg total) by mouth daily., Disp: 90 tablet, Rfl: 3    oxyCODONE-acetaminophen  MG Oral Tab, Take 1 tablet by mouth every 8 (eight) hours as needed for Pain. No alcohol or driving on this med. Stop if lethargic or hallucinating.  Maximum 4000 mg Tylenol/acetaminophen daily from all sources.  Recall that each Percocet has 325 mg of Tylenol/acetaminophen in it., Disp: 25 tablet, Rfl: 0    acetaminophen 325 MG Oral Tab, Take 2 tablets (650 mg total) by mouth every 6 (six) hours as needed for Pain. Maximum 4000 mg Tylenol/acetaminophen daily from all sources.  Each Norco has  325 mg of Tylenol/acetaminophen in it., Disp: 60 tablet, Rfl: 0    aspirin 325 MG Oral Tab EC, Take 1 tablet (325 mg total) by mouth in the morning and 1 tablet (325 mg total) before bedtime. Do not crush., Disp: 60 tablet, Rfl: 0    calcium carbonate-vitamin D 250-3.125 MG-MCG Oral Tab, Take 1 tablet by mouth 2 (two) times daily with meals., Disp: 60 tablet, Rfl: 0    multivitamin with minerals Oral Tab, Take 1 tablet by mouth daily., Disp: 30 tablet, Rfl: 0    naproxen 250 MG Oral Tab, Take 1 tablet (250 mg total) by mouth 2 (two) times daily with meals. Take with food.  Stop if stomach upset., Disp: 28 tablet, Rfl: 0    ROSUVASTATIN 10 MG Oral Tab, TAKE 1 TABLET(10 MG) BY MOUTH EVERY NIGHT, Disp: 90 tablet, Rfl: 0    estradiol (ESTRACE) 0.1 MG/GM Vaginal Cream, Apply 0.5 gram vaginally 2  times per week., Disp: 42.5 g, Rfl: 3    Glucose Blood (ONETOUCH ULTRA) In Vitro Strip, 1 each by Other route in the morning and 1 each before bedtime., Disp: 200 strip, Rfl: 1    gabapentin 300 MG Oral Cap, Take 1 capsule (300 mg total) by mouth in the morning, at noon, and at bedtime., Disp: 270 capsule, Rfl: 1    metFORMIN HCl 1000 MG Oral Tab, Take 1 tablet (1,000 mg total) by mouth daily with breakfast AND 0.5 tablets (500 mg total) daily with dinner., Disp: , Rfl:     ALLERGIES:   No Known Allergies    SOCIAL HISTORY:   Social History     Socioeconomic History    Marital status:    Tobacco Use    Smoking status: Never     Passive exposure: Never    Smokeless tobacco: Never   Vaping Use    Vaping status: Never Used   Substance and Sexual Activity    Alcohol use: Not Currently    Drug use: Never    Sexual activity: Yes       PAST MEDICAL HISTORY:   Past Medical History:    Diabetes (HCC)    Diabetes mellitus (HCC)    Esophageal reflux    Hyperlipidemia    Migraines    Osteoarthritis    RIGHT ARM PAIN, left knee    Visual impairment    readers     PAST SURGICAL HISTORY:   Past Surgical History:   Procedure Laterality  Date    Cholecystectomy      Cystoscopy,insert ureteral stent      bladder sling 7 years ago    Hysterectomy      Total knee replacement      Tubal ligation       PHYSICAL EXAM:   Vitals:    09/19/24 1105   BP: 100/63   Pulse: 81   Weight: 153 lb (69.4 kg)   Height: 4' 10\" (1.473 m)     BMI:   Body mass index is 31.98 kg/m².    General Appearance:  alert, well developed, in no acute distress  Nutritional:  no extreme weight gain or loss  Head: Atraumatic  Eyes:  normal conjunctivae, sclera., normal sclera and normal pupils  Throat/Neck: normal sound to voice. Normal hearing, normal speech  Back: no kyphosis  Respiratory:  Speaking in full sentences, non-labored. no increased work of breathing, no audible wheezing    Skin:  normal moisture and skin texture, no visible lesions  Hair and nails: normal scalp hair  Hematologic:  no excessive bruising  Neuro: motor grossly intact, moving all extremities without difficulty  Psychiatric:  oriented to time, self, and place  Extremities: no obvious extremity swelling, no lesions    Diabetes Foot Exam:  Bilateral barefoot skin diabetic exam is normal, visualized feet and the appearance is normal.  Bilateral monofilament/sensation of both feet is normal.  Pulsation pedal pulse exam of both lower legs/feet is normal as well.    LABS: Pertinent labs reviewed    ASSESSMENT/PLAN:    -Reviewed with patient the pathogenesis of diabetes, clinical significance of A1c, and common complications such as: microvascular, macrovascular and diabetic ketoacidosis. Patient verbalizes understanding of the importance of glycemic control and the goals of therapy.   -Discussed with patient glucose targets ranges (Fasting  and post prandial <180).     1.Type 2 Diabetes Mellitus, controlled  -LAB DATA  HbA,C: 5.6% today   a) Medications  - decrease Metformin 1,000 --> 500mg with breakfast  - continue with Metformin 500mg with dinner   - continue with Trulicity 4.5mg once weekly     -discussed  to continue limiting carbs to 30-45gm per meal/ max 135gm per day.   - continue to stay active as much as safely able - goal is at least 150 mins per week    -reviewed target goal BG readings and A1C  -reviewed when to call and notify me of abnormal BG readings.     b) No nephropathy: GFR: 108 on 8/10/2024 and urine MA: <0.3mg/dL on 4/3/2024  c) UTD with optho - followed by Dr. Ocampo   D) foot exam: normal today 2024  e) cont.  testing BG readings 1x daily - discussed to start alternating testing times with fasting or 2hrs after meals   f) Life style changes reviewed.     2. Blood Pressure Management   - normotensive today     3.Hyperlipidemia   - LDL: 46 and Tri on 2024  - atorvastatin -d/c'ed due to myalgia symptoms   -discussed per ADA  guidelines - LDL goal is <70   - on rosuvastatin 10mg at bedtime       RTC in 6 months   Patient instructed to call sooner if they develop Blood glucose readings <75 and/or if they have readings persistently >200.     The risks and benefits of my recommendations were discussed with the patient today. questions were also answered to the best of my knowledge. Patient verbalizes understanding of these issues and agrees to the plan.    2024  GAVINO Jiménez

## 2024-09-23 ENCOUNTER — OFFICE VISIT (OUTPATIENT)
Dept: RHEUMATOLOGY | Facility: CLINIC | Age: 59
End: 2024-09-23
Payer: MEDICAID

## 2024-09-23 VITALS
SYSTOLIC BLOOD PRESSURE: 111 MMHG | DIASTOLIC BLOOD PRESSURE: 61 MMHG | TEMPERATURE: 79 F | BODY MASS INDEX: 32.29 KG/M2 | WEIGHT: 153.81 LBS | RESPIRATION RATE: 16 BRPM | HEIGHT: 58 IN

## 2024-09-23 DIAGNOSIS — G89.29 CHRONIC LEFT-SIDED LOW BACK PAIN WITH LEFT-SIDED SCIATICA: ICD-10-CM

## 2024-09-23 DIAGNOSIS — M79.7 FIBROMYALGIA: Primary | ICD-10-CM

## 2024-09-23 DIAGNOSIS — M54.42 CHRONIC LEFT-SIDED LOW BACK PAIN WITH LEFT-SIDED SCIATICA: ICD-10-CM

## 2024-09-23 PROCEDURE — 99214 OFFICE O/P EST MOD 30 MIN: CPT | Performed by: INTERNAL MEDICINE

## 2024-09-23 RX ORDER — GABAPENTIN 300 MG/1
300 CAPSULE ORAL 3 TIMES DAILY
Qty: 270 CAPSULE | Refills: 1 | Status: SHIPPED | OUTPATIENT
Start: 2024-09-23

## 2024-09-23 NOTE — PATIENT INSTRUCTIONS
Cont. Gabapentin - 300mg three times a day -    Exercises for right hand     Return to clinic in 4  months.

## 2024-09-23 NOTE — PROGRESS NOTES
Matilde Ferrera is a 59 year old female who presents for   Chief Complaint   Patient presents with    Fibromyalgia    Lab Results    Medication Follow-Up    Hip Pain     Right, onset x1 month, recent knee surgery 8/8   .   HPI:     I had the pleasure of seeing Matilde Ferrera on 8/14/2023 for evaluation.     She is a pleasant 58 year old with hx of DM, obestity, migraines, is here for diffuse. Pain. She  has osteoarthritis of the knee and diffuse body pain. She was referred by Dr. Almanza.   She's had severe body aches x 1 month.   She was before aching in her knees and told it was bone on bone.   She was going to do injections but she was diabetic and over time she was able to bring her hba1c was 6.5 last week.   She is planning to get injections for her knees now. She has appt. Tomorrow. With him.   She tried steroid injecitons in the past and they didn't work a couple of years aog.   She did physical thearpy a couple of years aog.   She is thinking of getting the gel injecitons. Sees [facility] tomorrow.   She did enrol her in senior PT by her [facility] but not able to go b/c of diffuse boday ach e  Her shoudlers, fingers and just to touch her she is crying.     She's also taking care of her  at night - he has a trach and getting evaluated for lung transplante.d     She's getting alto of migraine headaches. They stopped for 6 years and now it's coming back.   She has noticed it 2-3 times it's coming a week.   The headaches are around her temples.   No jaw pain.   No loss of weight.   No fevers.   Started with injections for diabetes - dropped 6 lbs.   She saw ophthalmologist - 3 weeks ago and told it looked ok. 0 was having blurry vision on her right side     No rashes,,. No hx of psoriasis   No malar rash -   No patchy alopecia - diffuse hair loss -     9/8/2023  She finished the steroids yesterday.   She had a right temporal artery bx on 9/6/2023 - this was not showing GCA  It did help her feels better  with the ehadaches.   She feels that her knees starting to get swollen again.   She felt better inher knees with the steroids as well.   Her visiton is better now too.   She took two tyelnol at night b/c of ongoing pain in her knees. Worse in her knees down -   She has about 8-9/10 pain - on her left nkte and down.   The other body pain is gone.     1/22/2024 -   She is off prendisone.   She fel the prednisoen did help her out.   Prefer ibuprofen than prednisone   She is feeling the gabpanetin 300mg at night is helping.   Her pain is a little bit better   Her pain is 4/10 pain.   She feels she gets flare of nerve pain   The pain shoots down her legs.   She gets frustatrted and tearful with that scitaic pain.   She was doing PT for her legs.   She is going to get injections - /and therapy for her back tomorrow -     5/6/2024  Used .  [id number] ronald  She has more shoulder pain and upper back pain   She has pain - the PT is helping her hips pain - It helps for her her hips.   She did not get injections - just therapy.   She is now taking gabapnetin 300mg bid - not sleepy with it   She has about 7/10 pain.   She has more pain in her shoulder - and hands   She has trouble going up stairs and it's more difficult.   She had a left knee replacement.     9/23/2024  Used  [id number]  Her inflammation markers were normal in 5/2024 - no prednisone sent in   The gabpanetin 300mg tid is helping her pain -   She feels it's helping good -   She's on oxycodone from ortho for knee pain.   She has right knee replacement on 8/8/2024 - and is getting post surgical PT - so she feels good with the pain meds.         Wt Readings from Last 2 Encounters:   09/23/24 153 lb 12.8 oz (69.8 kg)   09/19/24 153 lb (69.4 kg)     Body mass index is 32.14 kg/m².      Current Outpatient Medications   Medication Sig Dispense Refill    Dulaglutide (TRULICITY) 4.5 MG/0.5ML Subcutaneous Solution Pen-injector Inject into the  skin.      docusate sodium 100 MG Oral Cap Take 100 mg by mouth 2 (two) times daily. Do not crush. Stop if loose stool 20 capsule 0    Mirabegron ER (MYRBETRIQ) 50 MG Oral Tablet 24 Hr Take 1 tablet (50 mg total) by mouth daily. 90 tablet 3    oxyCODONE-acetaminophen  MG Oral Tab Take 1 tablet by mouth every 8 (eight) hours as needed for Pain. No alcohol or driving on this med. Stop if lethargic or hallucinating.  Maximum 4000 mg Tylenol/acetaminophen daily from all sources.  Recall that each Percocet has 325 mg of Tylenol/acetaminophen in it. 25 tablet 0    acetaminophen 325 MG Oral Tab Take 2 tablets (650 mg total) by mouth every 6 (six) hours as needed for Pain. Maximum 4000 mg Tylenol/acetaminophen daily from all sources.  Each Norco has 325 mg of Tylenol/acetaminophen in it. 60 tablet 0    aspirin 325 MG Oral Tab EC Take 1 tablet (325 mg total) by mouth in the morning and 1 tablet (325 mg total) before bedtime. Do not crush. 60 tablet 0    calcium carbonate-vitamin D 250-3.125 MG-MCG Oral Tab Take 1 tablet by mouth 2 (two) times daily with meals. 60 tablet 0    multivitamin with minerals Oral Tab Take 1 tablet by mouth daily. 30 tablet 0    naproxen 250 MG Oral Tab Take 1 tablet (250 mg total) by mouth 2 (two) times daily with meals. Take with food.  Stop if stomach upset. 28 tablet 0    ROSUVASTATIN 10 MG Oral Tab TAKE 1 TABLET(10 MG) BY MOUTH EVERY NIGHT 90 tablet 0    estradiol (ESTRACE) 0.1 MG/GM Vaginal Cream Apply 0.5 gram vaginally 2  times per week. 42.5 g 3    Glucose Blood (ONETOUCH ULTRA) In Vitro Strip 1 each by Other route in the morning and 1 each before bedtime. 200 strip 1    gabapentin 300 MG Oral Cap Take 1 capsule (300 mg total) by mouth in the morning, at noon, and at bedtime. 270 capsule 1    metFORMIN HCl 1000 MG Oral Tab Take 1 tablet (1,000 mg total) by mouth daily with breakfast AND 0.5 tablets (500 mg total) daily with dinner.        Past Medical History:    Diabetes (HCC)     Diabetes mellitus (HCC)    Esophageal reflux    Hyperlipidemia    Migraines    Osteoarthritis    RIGHT ARM PAIN, left knee    Visual impairment    readers      Past Surgical History:   Procedure Laterality Date    Cholecystectomy      Cystoscopy,insert ureteral stent      bladder sling 7 years ago    Hysterectomy      Total knee replacement      Tubal ligation        Family History   Problem Relation Age of Onset    Diabetes Father     Diabetes Mother       Social History:  Social History     Socioeconomic History    Marital status:    Tobacco Use    Smoking status: Never     Passive exposure: Never    Smokeless tobacco: Never   Vaping Use    Vaping status: Never Used   Substance and Sexual Activity    Alcohol use: Not Currently    Drug use: Never    Sexual activity: Yes     Social Determinants of Health     Food Insecurity: No Food Insecurity (8/8/2024)    Food Insecurity     Food Insecurity: Never true   Transportation Needs: No Transportation Needs (8/8/2024)    Transportation Needs     Lack of Transportation: No   Housing Stability: Low Risk  (8/8/2024)    Housing Stability     Housing Instability: No           REVIEW OF SYSTEMS:   Review Of Systems:  Fatigue  Constitutional:No fever, no change in weight or appetitie  Derm: No rashes, no oral ulcers, no alopecia, no photosensitivity, no psoriasis  HEENT: No dry eyes, no dry mouth, no Raynaud's, no nasal ulcers, no parotid swelling, no neck pain, no jaw pain, no temple pain  Eyes: No visual changes,   CVS: No chest pain, no heart disease  RS: No SOB, no Cough, No Pleurtic pain,   GI: No nausea, no vomiiting, no abominal pain, no hx of ulcer, no gastritis, no heartburn, no dyshpagia, no BRBPR or melena  : no dysuria, no hx of miscarriages, no DVT Hx, no hx of OCP,   Neuro: No numbness or tingling, no headache, no hx of seizures,   Psych: no hx of anxiety or depression  ENDO: no hx of thyroid disease, no hx of DM  Joint/Muscluskeltal: see HPI,   All other  ROS are negative.     EXAM:   /61   Temp (!) 79 °F (26.1 °C)   Resp 16   Ht 4' 10\" (1.473 m)   Wt 153 lb 12.8 oz (69.8 kg)   BMI 32.14 kg/m²   HEENT: Clear oropharynx, no oral ulcers, EOM intact, clear sclear, PERRLA, pleasant, no acute distress, no CAD,   No rashes  CVS: RRR, no murmurs  RS: CTAB, no crackles, no rhonchi  ABD: Soft Non tender, no HSM felt, BS positive  Joint exam:   No Temple tenderness  no neck tendnerness, good ROM,   Tender in bilateral shoulders difficult raising her arms up   Tender in b/l hips   Tender in b;l thigh   Tender in lower back   Tender in b/l knees - even in left knee which is replaced  EXTREMITIES: no cyanosis, clubbing or edema  NEURO: intact touch, 5/5 ue and le strength    Component      Latest Ref Rng 6/23/2023 8/10/2023   GLUCOSE BLOOD  107    Test Strip Lot #      Numeric  [id number]    Test Strip Expiration Date      Date  1/26/24    HEMOGLOBIN A1C      4.3 - 5.6 %  6.5 !    Cartridge Lot#      Numeric  [id number]    Cartridge Expiration Date      Date  6/30/25    Hepatitis A Virus Ab, Total      Nonreactive   Reactive !     HBSAg Screen      Nonreactive   Nonreactive     HEPATITIS B CORE AB, TOTAL      Nonreactive   Nonreactive     HEPATITIS B SURFACE AB QUAL      Nonreactive   Nonreactive     HEPATITIS B SURFACE AB QUANT      mIU/mL <3.10     HCV AB      Nonreactive   Nonreactive     Iron, Serum      50 - 170 ug/dL 58     Transferrin      200 - 360 mg/dL 231     Iron Bind.Cap.(TIBC)      240 - 450 ug/dL 344     Iron Saturation      15 - 50 % 17     FERRITIN      18.0 - 340.0 ng/mL 21.4     HIV Antigen Antibody Combo      Non-Reactive  Non-Reactive     HEPATITIS A AB, IGM      Nonreactive   Nonreactive        Component      Latest Ref Rng 6/9/2023   Glucose      70 - 99 mg/dL 117 (H)    Sodium      136 - 145 mmol/L 143    Potassium      3.5 - 5.1 mmol/L 3.7    Chloride      98 - 112 mmol/L 108    Carbon Dioxide, Total      21.0 - 32.0 mmol/L 28.0    ANION GAP      0  - 18 mmol/L 7    BUN      7 - 18 mg/dL 7    CREATININE      0.55 - 1.02 mg/dL 0.48 (L)    BUN/CREATININE RATIO      10.0 - 20.0  14.6    CALCIUM      8.5 - 10.1 mg/dL 9.0    CALCULATED OSMOLALITY      275 - 295 mOsm/kg 295    eGFR-Cr      >=60 mL/min/1.73m2 110    ALT (SGPT)      13 - 56 U/L 95 (H)    AST (SGOT)      15 - 37 U/L 45 (H)    ALKALINE PHOSPHATASE      46 - 118 U/L 103    Total Bilirubin      0.1 - 2.0 mg/dL 0.6    PROTEIN, TOTAL      6.4 - 8.2 g/dL 7.7    Albumin      3.4 - 5.0 g/dL 3.8    Globulin      2.8 - 4.4 g/dL 3.9    A/G Ratio      1.0 - 2.0  1.0    Patient Fasting for CMP? Yes    Cholesterol, Total      <200 mg/dL 172    HDL Cholesterol      40 - 59 mg/dL 63 (H)    Triglycerides      30 - 149 mg/dL 122    LDL Cholesterol Calc      <100 mg/dL 88    VLDL      0 - 30 mg/dL 20    NON-HDL CHOLESTEROL      <130 mg/dL 109    Patient Fasting for Lipid? Yes    MALB URINE      mg/dL 1.51    CREATININE UR RANDOM      mg/dL 147.00    MALB/CRE CALC      <=30.0 ug/mg 10.3    TSH      0.358 - 3.740 mIU/mL 2.260    VITAMIN D, 25-OH, TOTAL      30.0 - 100.0 ng/mL 15.8 (L)         Component      Latest Ref [facility] 8/14/2023   MYELOPEROX ANTIBODIES, IGG      0.0 - 0.9 units <0.2    SERINE PROTEASE3, IGG      0.0 - 0.9 units <0.2    Cytoplasmic (C-ANCA)      Neg:<1:20 titer <1:20    Perinuclear (P-ANCA)      Neg:<1:20 titer <1:20    ATYPICAL PANCA      Neg:<1:20 titer <1:20    Aldolase      3.3 - 10.3 U/L 10.7 (H)    CK      26 - 192 U/L 80    C-REACTIVE PROTEIN      <0.30 mg/dL <0.29    SED RATE      0 - 30 mm/Hr 10    DUSTIN SCREEN WITH REFLEX (S)      Negative  Negative    C-Citrullinated Peptide IgG AB      0.0 - 6.9 U/mL 0.6    RHEUMATOID FACTOR      <15 IU/mL <10         Component      Latest Ref [facility] 4/3/2024   WBC      4.0 - 11.0 x10(3) uL 7.5    RBC      3.80 - 5.30 x10(6)uL 4.73    Hemoglobin      12.0 - 16.0 g/dL 14.4    Hematocrit      35.0 - 48.0 % 42.1    MCV      80.0 - 100.0 fL 89.0    MCH      26.0 - 34.0  pg 30.4    MCHC      31.0 - 37.0 g/dL 34.2    RDW-SD      35.1 - 46.3 fL 38.8    RDW      11.0 - 15.0 % 11.9    Platelet Count      150.0 - 450.0 10(3)uL 392.0    Prelim Neutrophil Abs      1.50 - 7.70 x10 (3) uL 2.91    Neutrophils Absolute      1.50 - 7.70 x10(3) uL 2.91    Lymphocytes Absolute      1.00 - 4.00 x10(3) uL 3.67    Monocytes Absolute      0.10 - 1.00 x10(3) uL 0.49    Eosinophils Absolute      0.00 - 0.70 x10(3) uL 0.36    Basophils Absolute      0.00 - 0.20 x10(3) uL 0.06    Immature Granulocyte Absolute      0.00 - 1.00 x10(3) uL 0.02    Neutrophils %      % 38.7    Lymphocytes %      % 48.9    Monocytes %      % 6.5    Eosinophils %      % 4.8    Basophils %      % 0.8    Immature Granulocyte %      % 0.3    Glucose      70 - 99 mg/dL 94    Sodium      136 - 145 mmol/L 142    Potassium      3.5 - 5.1 mmol/L 3.8    Chloride      98 - 112 mmol/L 107    Carbon Dioxide, Total      21.0 - 32.0 mmol/L 26.0    ANION GAP      0 - 18 mmol/L 9    BUN      9 - 23 mg/dL 7 (L)    CREATININE      0.55 - 1.02 mg/dL 0.48 (L)    BUN/CREATININE RATIO      10.0 - 20.0  14.6    CALCIUM      8.7 - 10.4 mg/dL 9.7    CALCULATED OSMOLALITY      275 - 295 mOsm/kg 292    EGFR      >=60 mL/min/1.73m2 109    ALT (SGPT)      10 - 49 U/L 33    AST (SGOT)      <=34 U/L 27    ALKALINE PHOSPHATASE      46 - 118 U/L 101    Total Bilirubin      0.3 - 1.2 mg/dL 0.6    PROTEIN, TOTAL      5.7 - 8.2 g/dL 7.7    Albumin      3.2 - 4.8 g/dL 4.5    Globulin      2.8 - 4.4 g/dL 3.2    A/G Ratio      1.0 - 2.0  1.4    Patient Fasting for CMP? Yes    Color Urine      Yellow  Light-Yellow    Clarity Urine      Clear  Clear    Spec Gravity      1.005 - 1.030  1.017    Glucose Urine      Normal mg/dL Normal    Bilirubin Urine      Negative  Negative    Ketones, UA      Negative mg/dL Negative    Blood Urine      Negative  Negative    PH Urine      5.0 - 8.0  6.5    Protein Urine      Negative mg/dL Negative    Urobilinogen Urine      Normal   Normal    Nitrite Urine      Negative  Negative    Leukocyte Esterase       Negative  Negative    Microscopic Microscopic not indicated    Cholesterol, Total      <200 mg/dL 204 (H)    HDL Cholesterol      40 - 59 mg/dL 46    Triglycerides      30 - 149 mg/dL 175 (H)    LDL Cholesterol Calc      <100 mg/dL 127 (H)    VLDL      0 - 30 mg/dL 31 (H)    NON-HDL CHOLESTEROL      <130 mg/dL 158 (H)    Patient Fasting for Lipid? Yes    MALB URINE      mg/dL <0.30    CREATININE UR RANDOM      mg/dL 85.10    MALB/CRE CALC --    HEMOGLOBIN A1c      <5.7 % 6.0 (H)    ESTIMATED AVERAGE GLUCOSE      68 - 126 mg/dL 126    VITAMIN D, 25-OH, TOTAL      30.0 - 100.0 ng/mL 29.8 (L)    Bilirubin, Direct      <=0.3 mg/dL 0.2       Component      Latest Ref Rng 5/7/2024   SED RATE      0 - 30 mm/Hr 14    C-REACTIVE PROTEIN      <1.00 mg/dL <0.40    Did not send in prednisone  5/15/2023 - b/l knee xray    1. Demineralization.   2. Moderate to severe osteoarthritis bilaterally.   3. Small joint effusions.   4. 1.78 cm calcification in the distal soft tissues of the thigh on the right.       ASSESSMENT AND PLAN:   Matilde Ferrera is a 59 year old female who presents for   Chief Complaint   Patient presents with    Fibromyalgia    Lab Results    Medication Follow-Up    Hip Pain     Right, onset x1 month, recent knee surgery 8/8     Fibromyalgia, Diffuse polayrthraliga and myaliga - new onset headaches , back pain   - labs negative for  inflammatory arthritis and/or connective tissue disease   - not likely  PMR/temporal arteriris and temporal artery bx on 9/6/2023 - was normal -  Repeat esr and crp on 5/2024 - is normal   - currently her vision is good -  optho 3 weeks ago , blurry vision 4 times - nono now   - finished prednisone 60mg -  60mg x 1 week, then 40mg x 1 week, then 20mg a day x 2 weeks , - started on 8/14/2023 -   - and now weaned off Prednsione 5mg x 3 days, and now off Normal eser andcrp   - .shoulder pain is dOing better on  gabapentin  300mg three times a day   - rtc in 3- 4 months    Primary osteoarthritis of knees - better after surger  Getting left knee sx on 8/2024 -   S/o right knee replacement in 8/8/2024    3. DM - now 6.5 - , and was on 9.5 -   Started on truliciy, metformin and glipizide -   If sugars are too high - needs better contre.     4. Left sided sciatica lower back pain -   Send for physical therpay for lower back   Refer to physiatry for potential injections   Cont. gabpanetin to 300mg tid - is helping  F/u ortho     5. Vit d def -     6. Elevated liver tests in 6/2023 - reepeat in 3/2024 -is normal hold on nsaids at this time.     7. Right 3rd trigger finger intermittently - will give exercises - if not better in the future can try an injection - no trigger finger today on exam    Summary:   Cont. Gabapentin - 300mg three times a day -    Exercises for right hand     Return to clinic in 4  months.         Robina Emerson MD  9/23/2024   11:10 AM     is applicable because the patient's medical record notes reflects the ongoing nature of the continuous longitudinal relationship of care, and the medical record indicates that there is ongoing treatment of a serious/complex medical condition.

## 2024-09-24 ENCOUNTER — OFFICE VISIT (OUTPATIENT)
Dept: PHYSICAL THERAPY | Facility: HOSPITAL | Age: 59
End: 2024-09-24
Payer: MEDICAID

## 2024-09-24 PROCEDURE — 97140 MANUAL THERAPY 1/> REGIONS: CPT | Performed by: PHYSICAL THERAPIST

## 2024-09-24 PROCEDURE — 97112 NEUROMUSCULAR REEDUCATION: CPT | Performed by: PHYSICAL THERAPIST

## 2024-09-24 PROCEDURE — 97110 THERAPEUTIC EXERCISES: CPT | Performed by: PHYSICAL THERAPIST

## 2024-09-24 NOTE — PROGRESS NOTES
Diagnosis:   Orthopedic aftercare (Z47.89)  S/P TKR (total knee replacement) using cement, right   (Z96.651)  Primary osteoarthritis of right knee (M17.11)      Referring Provider: Shameka Phipps  Date of Evaluation:    9/5/2024    Precautions: diabetes Next MD visit:   None scheduled   Date of Surgery: 8/8/24     Insurance Primary/Secondary: BLUE CROSS MEDICAID / N/A     # Auth Visits: 15v (exp 11/4/24)            Subjective: Patient is Syriac speaking so language line solution was used. She reports increased pain today.       Pain:  7/10 R knee      Objective: see rx flowsheet    9/24/24  R knee flexion 120 with heel slides   R knee extension: -4    9/18/24  R knee flexion PROM: 120 with heel slides   R knee extension: -4    9/16/24  R knee flexion PROM: 120   R knee extension: -1 (-4 at rest)    9/13/24  R knee flexion PROM: 118  R knee extension PROM: -3    9/10/24:   Observation: healed, clean incision; 2 small dry scabs/eschar at most proximal incision point, 2 very small scabs at the distal point of the incision  Girth: L 36cm; L 40.5 cm (at knee jt line)   Palpation: 2-3+ pitting edema at the anterior knee joint (with patellar mobs), increased warmth  Gait mechanics: can ambulate without an AD with good mechanics, mild antalgia and trendelenburg during R stance    R knee passive ROM:  Flexion: 110  Extension: -8 resting; -5 passively    Strength/MMT: (* denotes performed with pain)  Hip Knee   IE  Flexion: R 4/5; L 4/5  Extension: R 3/5; L 3/5  Abduction: R 3/5; L 3/5  ER: R 3/5; L 3/5 IE  Flexion: R 3/5; L 5/5  Extension: R 4/5; L 5/5        IE Balance: SLS: R 3 sec, L 5 sec      Assessment: Although patient continues to have pain, she has been demonstrating improved knee ROM. However, she is still having quad strength weakness and extensor lag is noticeable while performing SLR. She also has decreased stance time in her RLE while ambulating. New STS exercise was added today to further improve her strength  so that she can eventually ambulate with no difficulty. She tolerated well with all exercises without any significant increase in pain.      Goals:   Pt will improve knee extension ROM to 0 deg to allow proper heel strike during gait and terminal knee extension in stance  Pt will improve knee AROM flexion to >128 degrees to improve ability to perform bending  Pt will improve quad strength to 5/5 to ascend 1 flight of stairs reciprocally without UE assist  Pt will increase hip and knee strength to grossly 4+/5 to be able to get up and down from the floor safely  Pt will demonstrate increased hip ER/ABD strength to 4/5 to perform stepping and squatting activities without excessive femoral IR/ADD  Pt will improve SLS to >5s to improve safety and independence with gait on uneven surfaces such as grass  Pt will be independent and compliant with comprehensive HEP to maintain progress achieved in PT    Plan: Patient will be seen for 2 x/week or a total of 8 visits over a 90 day period.  Treatment will include: Gait training, Manual Therapy, Neuromuscular Re-education, Therapeutic Activities, Therapeutic Exercise, and Home Exercise Program instruction  Progress range of motion, patellar mobility, quad activation; leonordles 6\" when able, gait mechanics, stair negotiation   Date: 9/10/2024  TX#: 2/8 Date:  9/13/24               TX#: 3/8 Date: 9/16/24                TX#: 4/8 Date: 9/18/24             TX#: 5/8 Date: 9/24/24  Tx#: 6/15   TE: x15'  Objective assessment  heel slides with green strap x20 R  Bridge x15 (feet slightly staggered due to limitations in ROM)   Seated HS stretch 5x10\" holds R    NMR x10'  QS 5 sec hold x20 R  SAQ 2x10 R  Gait mechanics with and without 2ww    MT: x15'  Supine patellar glides all direction GII-III R  Hook lying tib-fem glides AP and PA GII-III R  Tibial ER and IR R  Gentle over pressure into flexion and extension R knee  Gentle scar mobility TE: x15'  Seated HS stretching on table R  Supine  calf stretch with green strap 5x10-15\" holds R  heel slides with green strap x20 R  Supine HS stretch with green strap 5x10\" R  Heel raises 2x10   Slantboard stretch x2'    NMR x10'  STS x15  SLS with hip ext with glider at ballet bar x10 bilat  4\" step up 2x10 R    MT: x15'  Supine patellar glides all direction GII-III R  Hook lying tib-fem glides AP and PA GII-III R  Tibial ER and IR R  Gentle over pressure into flexion and extension R knee  Gentle scar mobility  STM R HS  TE: x17'  Supine calf stretch with green strap 5x10\" holds R  Supine HS stretch with green strap 5x10\" R  Heel slides with green strap x20 R  Bridge 2x10  Prone knee hang 2# x3'  SL shuttle leg press 42# x10, 55# x24 R   Slantboard stretch x2'    NMR: x15'  SLR with QS x10 R  SL hip abduction x10 R  SL clam x15 R  Standing TKE with ball on wall 3\" hold x15 R  6\" step up 2x10 R    MT: x10'  Supine patellar glides all direction GII-III R  Hook lying tib-fem glides AP GII-III R  Tibial ER and IR R  Gentle over pressure into flexion and extension R knee TE: x20'   Supine BKTC with swiss ball 3x10   Supine HS stretch with green strap 3x30s R  Heel slides with green strap 3x10 R  Prone knee hang 2# x3'  SL shuttle leg press 62# 3x10 R   Slantboard stretch 3x30s   Standing on 6 in step knee flexion AAROM 3x10     NMR: x15'  SLR with QS x10 R  SL hip abduction x10 R  SL clam x15 R  Standing TKE with ball on wall 3\" hold x15 R  6\" step up 2x10 R    MT: x10'  Supine patellar glides all direction GII-III R  Hook lying tib-fem glides AP GII-III R  Tibial ER and IR R  Gentle over pressure into flexion and extension R knee TE: x20'   Supine BKTC with swiss ball 3x10   Supine HS stretch with green strap 3x30s R  Heel slides with green strap 3x10 R  Prone knee hang 3# 3'  SL shuttle leg press 62# 3x10 R   Slantboard stretch 3x30s   Standing on 6 in step knee flexion AAROM 3x10     NMR: x15'  SLR with QS x10 R  SL hip abduction x10 R  SL clam x15 R YTB  Standing  TKE with ball on wall 3\" hold x15 R  6\" step up 3x10 R  STS 3x10    MT: x10'  Supine patellar glides all direction GII-III R  tib-fem glides AP GII-III R  Gentle over pressure into flexion and extension R knee   HEP: heel slides, gastroc stretch, hamstring stretch, quad sets     Charges: 1 TE 1 NMR 1 MT       Total Timed Treatment: 45 min  Total Treatment Time: 45 min

## 2024-09-26 ENCOUNTER — OFFICE VISIT (OUTPATIENT)
Dept: PHYSICAL THERAPY | Facility: HOSPITAL | Age: 59
End: 2024-09-26
Payer: MEDICAID

## 2024-09-26 PROCEDURE — 97140 MANUAL THERAPY 1/> REGIONS: CPT | Performed by: PHYSICAL THERAPIST

## 2024-09-26 PROCEDURE — 97112 NEUROMUSCULAR REEDUCATION: CPT | Performed by: PHYSICAL THERAPIST

## 2024-09-26 PROCEDURE — 97110 THERAPEUTIC EXERCISES: CPT | Performed by: PHYSICAL THERAPIST

## 2024-09-26 NOTE — PROGRESS NOTES
Diagnosis:   Orthopedic aftercare (Z47.89)  S/P TKR (total knee replacement) using cement, right   (Z96.651)  Primary osteoarthritis of right knee (M17.11)      Referring Provider: Shameka Phipps  Date of Evaluation:    9/5/2024    Precautions: diabetes Next MD visit:   None scheduled   Date of Surgery: 8/8/24     Insurance Primary/Secondary: BLUE CROSS MEDICAID / N/A     # Auth Visits: 15v (exp 11/4/24)            Subjective: Patient is [place] speaking so language line solution was used. She reports pain continues.      Pain:  6/10 R knee      Objective: see rx flowsheet  9/26/24  R knee flexion 120 with heel slides   R knee extension: -4      9/24/24  R knee flexion 120 with heel slides   R knee extension: -4    9/18/24  R knee flexion PROM: 120 with heel slides   R knee extension: -4    9/16/24  R knee flexion PROM: 120   R knee extension: -1 (-4 at rest)    9/13/24  R knee flexion PROM: 118  R knee extension PROM: -3    9/10/24:   Observation: healed, clean incision; 2 small dry scabs/eschar at most proximal incision point, 2 very small scabs at the distal point of the incision  Girth: L 36cm; L 40.5 cm (at knee jt line)   Palpation: 2-3+ pitting edema at the anterior knee joint (with patellar mobs), increased warmth  Gait mechanics: can ambulate without an AD with good mechanics, mild antalgia and trendelenburg during R stance    R knee passive ROM:  Flexion: 110  Extension: -8 resting; -5 passively    Strength/MMT: (* denotes performed with pain)  Hip Knee   IE  Flexion: R 4/5; L 4/5  Extension: R 3/5; L 3/5  Abduction: R 3/5; L 3/5  ER: R 3/5; L 3/5 IE  Flexion: R 3/5; L 5/5  Extension: R 4/5; L 5/5        IE Balance: SLS: R 3 sec, L 5 sec      Assessment: She is probably over doing it at home which could explain the pain in her R knee. Patient was educated on the importance of resting and icing due to continuing pain. She verbalized understanding. Ice was performed post-treatment to improve her pain.      Goals:   Pt will improve knee extension ROM to 0 deg to allow proper heel strike during gait and terminal knee extension in stance  Pt will improve knee AROM flexion to >128 degrees to improve ability to perform bending  Pt will improve quad strength to 5/5 to ascend 1 flight of stairs reciprocally without UE assist  Pt will increase hip and knee strength to grossly 4+/5 to be able to get up and down from the floor safely  Pt will demonstrate increased hip ER/ABD strength to 4/5 to perform stepping and squatting activities without excessive femoral IR/ADD  Pt will improve SLS to >5s to improve safety and independence with gait on uneven surfaces such as grass  Pt will be independent and compliant with comprehensive HEP to maintain progress achieved in PT    Plan: Patient will be seen for 2 x/week or a total of 8 visits over a 90 day period.  Treatment will include: Gait training, Manual Therapy, Neuromuscular Re-education, Therapeutic Activities, Therapeutic Exercise, and Home Exercise Program instruction  Progress range of motion, patellar mobility, quad activation; hurdles 6\" when able, gait mechanics, stair negotiation   Date: 9/10/2024  TX#: 2/8 Date:  9/13/24               TX#: 3/8 Date: 9/16/24                TX#: 4/8 Date: 9/18/24             TX#: 5/8 Date: 9/24/24  Tx#: 6/15 Date: 9/26/24  Tx#: 7/15   TE: x15'  Objective assessment  heel slides with green strap x20 R  Bridge x15 (feet slightly staggered due to limitations in ROM)   Seated HS stretch 5x10\" holds R    NMR x10'  QS 5 sec hold x20 R  SAQ 2x10 R  Gait mechanics with and without 2ww    MT: x15'  Supine patellar glides all direction GII-III R  Hook lying tib-fem glides AP and PA GII-III R  Tibial ER and IR R  Gentle over pressure into flexion and extension R knee  Gentle scar mobility TE: x15'  Seated HS stretching on table R  Supine calf stretch with green strap 5x10-15\" holds R  heel slides with green strap x20 R  Supine HS stretch with green  strap 5x10\" R  Heel raises 2x10   Slantboard stretch x2'    NMR x10'  STS x15  SLS with hip ext with glider at ballet bar x10 bilat  4\" step up 2x10 R    MT: x15'  Supine patellar glides all direction GII-III R  Hook lying tib-fem glides AP and PA GII-III R  Tibial ER and IR R  Gentle over pressure into flexion and extension R knee  Gentle scar mobility  STM R HS  TE: x17'  Supine calf stretch with green strap 5x10\" holds R  Supine HS stretch with green strap 5x10\" R  Heel slides with green strap x20 R  Bridge 2x10  Prone knee hang 2# x3'  SL shuttle leg press 42# x10, 55# x24 R   Slantboard stretch x2'    NMR: x15'  SLR with QS x10 R  SL hip abduction x10 R  SL clam x15 R  Standing TKE with ball on wall 3\" hold x15 R  6\" step up 2x10 R    MT: x10'  Supine patellar glides all direction GII-III R  Hook lying tib-fem glides AP GII-III R  Tibial ER and IR R  Gentle over pressure into flexion and extension R knee TE: x20'   Supine BKTC with swiss ball 3x10   Supine HS stretch with green strap 3x30s R  Heel slides with green strap 3x10 R  Prone knee hang 2# x3'  SL shuttle leg press 62# 3x10 R   Slantboard stretch 3x30s   Standing on 6 in step knee flexion AAROM 3x10     NMR: x15'  SLR with QS x10 R  SL hip abduction x10 R  SL clam x15 R  Standing TKE with ball on wall 3\" hold x15 R  6\" step up 2x10 R    MT: x10'  Supine patellar glides all direction GII-III R  Hook lying tib-fem glides AP GII-III R  Tibial ER and IR R  Gentle over pressure into flexion and extension R knee TE: x20'   Supine BKTC with swiss ball 3x10   Supine HS stretch with green strap 3x30s R  Heel slides with green strap 3x10 R  Prone knee hang 3# 3'  SL shuttle leg press 62# 3x10 R   Slantboard stretch 3x30s   Standing on 6 in step knee flexion AAROM 3x10     NMR: x15'  SLR with QS x10 R  SL hip abduction x10 R  SL clam x15 R YTB  Standing TKE with ball on wall 3\" hold x15 R  6\" step up 3x10 R  STS 3x10    MT: x10'  Supine patellar glides all  direction GII-III R  tib-fem glides AP GII-III R  Gentle over pressure into flexion and extension R knee TE: x20'   Supine BKTC with swiss ball 3x10   Supine HS stretch with green strap 3x30s R  Heel slides with green strap 3x10 R  Prone knee hang 3# 3'  SL shuttle leg press 62# 3x10 R   Slantboard stretch 3x30s   Standing on 6 in step knee flexion AAROM 3x10     NMR: x15'  SLR with QS x10 R  SL hip abduction x10 R  SL clam x15 R YTB  Standing TKE with ball on wall 3\" hold x15 R  6\" step up 3x10 R  STS 3x10    MT: x10'  Supine patellar glides all direction GII-III R  tib-fem glides AP GII-III R  Gentle over pressure into flexion and extension R knee   HEP: heel slides, gastroc stretch, hamstring stretch, quad sets     Charges: 1 TE 1 NMR 1 MT       Total Timed Treatment: 45 min  Total Treatment Time: 45 min

## 2024-10-01 DIAGNOSIS — Z96.651 S/P TKR (TOTAL KNEE REPLACEMENT) USING CEMENT, RIGHT: ICD-10-CM

## 2024-10-01 DIAGNOSIS — M17.11 PRIMARY OSTEOARTHRITIS OF RIGHT KNEE: ICD-10-CM

## 2024-10-01 RX ORDER — OXYCODONE AND ACETAMINOPHEN 10; 325 MG/1; MG/1
1 TABLET ORAL EVERY 8 HOURS PRN
Qty: 25 TABLET | Refills: 0 | Status: SHIPPED | OUTPATIENT
Start: 2024-10-01

## 2024-10-01 NOTE — TELEPHONE ENCOUNTER
Percocet refilled.  Please let her know this is the last refill of Percocet.  Will go back to [facility] or even stop narcotics after this.

## 2024-10-02 ENCOUNTER — OFFICE VISIT (OUTPATIENT)
Dept: PHYSICAL THERAPY | Facility: HOSPITAL | Age: 59
End: 2024-10-02
Payer: MEDICAID

## 2024-10-02 PROCEDURE — 97112 NEUROMUSCULAR REEDUCATION: CPT | Performed by: PHYSICAL THERAPIST

## 2024-10-02 PROCEDURE — 97110 THERAPEUTIC EXERCISES: CPT | Performed by: PHYSICAL THERAPIST

## 2024-10-02 PROCEDURE — 97140 MANUAL THERAPY 1/> REGIONS: CPT | Performed by: PHYSICAL THERAPIST

## 2024-10-02 NOTE — PROGRESS NOTES
Diagnosis:   Orthopedic aftercare (Z47.89)  S/P TKR (total knee replacement) using cement, right   (Z96.651)  Primary osteoarthritis of right knee (M17.11)      Referring Provider: Shameka Phipps  Date of Evaluation:    9/5/2024    Precautions: diabetes Next MD visit:   None scheduled   Date of Surgery: 8/8/24     Insurance Primary/Secondary: BLUE CROSS MEDICAID / N/A     # Auth Visits: 15v (exp 11/4/24)            Subjective: Patient is [place] speaking so language line solution was used. She reports having increased pain in her R glut today more than her knee.      Pain: 5/10 R glut       Objective: see rx flowsheet  9/26/24  R knee flexion 120 with heel slides   R knee extension: -4      9/24/24  R knee flexion 120 with heel slides   R knee extension: -4    9/18/24  R knee flexion PROM: 120 with heel slides   R knee extension: -4    9/16/24  R knee flexion PROM: 120   R knee extension: -1 (-4 at rest)    9/13/24  R knee flexion PROM: 118  R knee extension PROM: -3    9/10/24:   Observation: healed, clean incision; 2 small dry scabs/eschar at most proximal incision point, 2 very small scabs at the distal point of the incision  Girth: L 36cm; L 40.5 cm (at knee jt line)   Palpation: 2-3+ pitting edema at the anterior knee joint (with patellar mobs), increased warmth  Gait mechanics: can ambulate without an AD with good mechanics, mild antalgia and trendelenburg during R stance    R knee passive ROM:  Flexion: 110  Extension: -8 resting; -5 passively    Strength/MMT: (* denotes performed with pain)  Hip Knee   IE  Flexion: R 4/5; L 4/5  Extension: R 3/5; L 3/5  Abduction: R 3/5; L 3/5  ER: R 3/5; L 3/5 IE  Flexion: R 3/5; L 5/5  Extension: R 4/5; L 5/5        IE Balance: SLS: R 3 sec, L 5 sec      Assessment: Due to pain in her R glut today, STM and stretching was performed today to improve her symptoms. Also, new exercises were added today to improve her quad strength. She tolerated well with all exercises without  increase in pain.     Goals:   Pt will improve knee extension ROM to 0 deg to allow proper heel strike during gait and terminal knee extension in stance  Pt will improve knee AROM flexion to >128 degrees to improve ability to perform bending  Pt will improve quad strength to 5/5 to ascend 1 flight of stairs reciprocally without UE assist  Pt will increase hip and knee strength to grossly 4+/5 to be able to get up and down from the floor safely  Pt will demonstrate increased hip ER/ABD strength to 4/5 to perform stepping and squatting activities without excessive femoral IR/ADD  Pt will improve SLS to >5s to improve safety and independence with gait on uneven surfaces such as grass  Pt will be independent and compliant with comprehensive HEP to maintain progress achieved in PT    Plan: Patient will be seen for 2 x/week or a total of 8 visits over a 90 day period.  Treatment will include: Gait training, Manual Therapy, Neuromuscular Re-education, Therapeutic Activities, Therapeutic Exercise, and Home Exercise Program instruction  Progress range of motion, patellar mobility, quad activation; hurdles 6\" when able, gait mechanics, stair negotiation   Date: 9/10/2024  TX#: 2/8 Date:  9/13/24               TX#: 3/8 Date: 9/16/24                TX#: 4/8 Date: 9/18/24             TX#: 5/8 Date: 9/24/24  Tx#: 6/15 Date: 9/26/24  Tx#: 7/15 Date: 10/2/24  Tx#: 8/15   TE: x15'  Objective assessment  heel slides with green strap x20 R  Bridge x15 (feet slightly staggered due to limitations in ROM)   Seated HS stretch 5x10\" holds R    NMR x10'  QS 5 sec hold x20 R  SAQ 2x10 R  Gait mechanics with and without 2ww    MT: x15'  Supine patellar glides all direction GII-III R  Hook lying tib-fem glides AP and PA GII-III R  Tibial ER and IR R  Gentle over pressure into flexion and extension R knee  Gentle scar mobility TE: x15'  Seated HS stretching on table R  Supine calf stretch with green strap 5x10-15\" holds R  heel slides with  green strap x20 R  Supine HS stretch with green strap 5x10\" R  Heel raises 2x10   Slantboard stretch x2'    NMR x10'  STS x15  SLS with hip ext with glider at ballet bar x10 bilat  4\" step up 2x10 R    MT: x15'  Supine patellar glides all direction GII-III R  Hook lying tib-fem glides AP and PA GII-III R  Tibial ER and IR R  Gentle over pressure into flexion and extension R knee  Gentle scar mobility  STM R HS  TE: x17'  Supine calf stretch with green strap 5x10\" holds R  Supine HS stretch with green strap 5x10\" R  Heel slides with green strap x20 R  Bridge 2x10  Prone knee hang 2# x3'  SL shuttle leg press 42# x10, 55# x24 R   Slantboard stretch x2'    NMR: x15'  SLR with QS x10 R  SL hip abduction x10 R  SL clam x15 R  Standing TKE with ball on wall 3\" hold x15 R  6\" step up 2x10 R    MT: x10'  Supine patellar glides all direction GII-III R  Hook lying tib-fem glides AP GII-III R  Tibial ER and IR R  Gentle over pressure into flexion and extension R knee TE: x20'   Supine BKTC with swiss ball 3x10   Supine HS stretch with green strap 3x30s R  Heel slides with green strap 3x10 R  Prone knee hang 2# x3'  SL shuttle leg press 62# 3x10 R   Slantboard stretch 3x30s   Standing on 6 in step knee flexion AAROM 3x10     NMR: x15'  SLR with QS x10 R  SL hip abduction x10 R  SL clam x15 R  Standing TKE with ball on wall 3\" hold x15 R  6\" step up 2x10 R    MT: x10'  Supine patellar glides all direction GII-III R  Hook lying tib-fem glides AP GII-III R  Tibial ER and IR R  Gentle over pressure into flexion and extension R knee TE: x20'   Supine BKTC with swiss ball 3x10   Supine HS stretch with green strap 3x30s R  Heel slides with green strap 3x10 R  Prone knee hang 3# 3'  SL shuttle leg press 62# 3x10 R   Slantboard stretch 3x30s   Standing on 6 in step knee flexion AAROM 3x10     NMR: x15'  SLR with QS x10 R  SL hip abduction x10 R  SL clam x15 R YTB  Standing TKE with ball on wall 3\" hold x15 R  6\" step up 3x10 R  STS  3x10    MT: x10'  Supine patellar glides all direction GII-III R  tib-fem glides AP GII-III R  Gentle over pressure into flexion and extension R knee TE: x20'   Supine BKTC with swiss ball 3x10   Supine HS stretch with green strap 3x30s R  Heel slides with green strap 3x10 R  Prone knee hang 3# 3'  SL shuttle leg press 62# 3x10 R   Slantboard stretch 3x30s   Standing on 6 in step knee flexion AAROM 3x10     NMR: x15'  SLR with QS x10 R  SL hip abduction x10 R  SL clam x15 R YTB  Standing TKE with ball on wall 3\" hold x15 R  6\" step up 3x10 R  STS 3x10    MT: x10'  Supine patellar glides all direction GII-III R  tib-fem glides AP GII-III R  Gentle over pressure into flexion and extension R knee TE: x20'   Supine BKTC with swiss ball 3x10   Supine piriformis stretch 3x30s ea bilat   SL shuttle leg press 62# 3x10 R   Standing on 6 in step knee flexion AAROM 3x10   Hurdles FWD/Side 6 laps ea     NMR: x15'  SLR with QS 2x10 R  SL hip abduction x10 R  SL clam 2x10 R YTB  6\" step up 3x10 R  STS 3x10  Standing TKE with GTB 3x10     MT: x10'  Supine patellar glides all direction GII-III R  tib-fem glides AP GII-III R  STM in the R glut    HEP: heel slides, gastroc stretch, hamstring stretch, quad sets     Charges: 1 TE 1 NMR 1 MT       Total Timed Treatment: 45 min  Total Treatment Time: 45 min

## 2024-10-02 NOTE — TELEPHONE ENCOUNTER
Called patient with language line and informed her per Dr Mclean's orders. Recommend she wean down on the Percocet. She states she takes 1 percocet per day and 1 tablet of Tylenol. I advised her she an take up to 3,000mg of Tylenol in a 24 hour period and that each tablet of Percocet of norco has 325mg of tylenol in it. She verbalized understanding and had no further concerns.

## 2024-10-04 ENCOUNTER — OFFICE VISIT (OUTPATIENT)
Dept: PHYSICAL THERAPY | Facility: HOSPITAL | Age: 59
End: 2024-10-04
Payer: MEDICAID

## 2024-10-04 PROCEDURE — 97110 THERAPEUTIC EXERCISES: CPT | Performed by: PHYSICAL THERAPIST

## 2024-10-04 PROCEDURE — 97112 NEUROMUSCULAR REEDUCATION: CPT | Performed by: PHYSICAL THERAPIST

## 2024-10-04 PROCEDURE — 97140 MANUAL THERAPY 1/> REGIONS: CPT | Performed by: PHYSICAL THERAPIST

## 2024-10-04 NOTE — PROGRESS NOTES
Diagnosis:   Orthopedic aftercare (Z47.89)  S/P TKR (total knee replacement) using cement, right   (Z96.651)  Primary osteoarthritis of right knee (M17.11)      Referring Provider: Shameka Phipps  Date of Evaluation:    9/5/2024    Precautions: diabetes Next MD visit:   None scheduled   Date of Surgery: 8/8/24     Insurance Primary/Secondary: BLUE CROSS MEDICAID / N/A     # Auth Visits: 15v (exp 11/4/24)            Subjective: Patient is Urdu speaking so language line solution was used. She reports improved pain in her R glut and knee today.      Pain: 4/10 R glut & 5/10 R knee        Objective: see rx flowsheet    R knee flexion 120 with heel slides   R knee extension: -4    9/10/24:   Observation: healed, clean incision; 2 small dry scabs/eschar at most proximal incision point, 2 very small scabs at the distal point of the incision  Girth: L 36cm; L 40.5 cm (at knee jt line)   Palpation: 2-3+ pitting edema at the anterior knee joint (with patellar mobs), increased warmth  Gait mechanics: can ambulate without an AD with good mechanics, mild antalgia and trendelenburg during R stance    R knee passive ROM:  Flexion: 110  Extension: -8 resting; -5 passively    Strength/MMT: (* denotes performed with pain)  Hip Knee   IE  Flexion: R 4/5; L 4/5  Extension: R 3/5; L 3/5  Abduction: R 3/5; L 3/5  ER: R 3/5; L 3/5 IE  Flexion: R 3/5; L 5/5  Extension: R 4/5; L 5/5        IE Balance: SLS: R 3 sec, L 5 sec      Assessment: Patient benefit well from last PT session with improved pain today. Incisional scars mobilization was performed today to improve her knee ROM. She tolerated well with all exercises without any significant increase in pain.     Goals:   Pt will improve knee extension ROM to 0 deg to allow proper heel strike during gait and terminal knee extension in stance  Pt will improve knee AROM flexion to >128 degrees to improve ability to perform bending  Pt will improve quad strength to 5/5 to ascend 1 flight of  stairs reciprocally without UE assist  Pt will increase hip and knee strength to grossly 4+/5 to be able to get up and down from the floor safely  Pt will demonstrate increased hip ER/ABD strength to 4/5 to perform stepping and squatting activities without excessive femoral IR/ADD  Pt will improve SLS to >5s to improve safety and independence with gait on uneven surfaces such as grass  Pt will be independent and compliant with comprehensive HEP to maintain progress achieved in PT    Plan: Patient will be seen for 2 x/week or a total of 8 visits over a 90 day period.  Treatment will include: Gait training, Manual Therapy, Neuromuscular Re-education, Therapeutic Activities, Therapeutic Exercise, and Home Exercise Program instruction  Progress range of motion, patellar mobility, quad activation; hurdles 6\" when able, gait mechanics, stair negotiation   Date: 9/24/24  Tx#: 6/15 Date: 9/26/24  Tx#: 7/15 Date: 10/2/24  Tx#: 8/15 Date: 10/4/24  Tx#: 9/15   TE: x20'   Supine BKTC with swiss ball 3x10   Supine HS stretch with green strap 3x30s R  Heel slides with green strap 3x10 R  Prone knee hang 3# 3'  SL shuttle leg press 62# 3x10 R   Slantboard stretch 3x30s   Standing on 6 in step knee flexion AAROM 3x10     NMR: x15'  SLR with QS x10 R  SL hip abduction x10 R  SL clam x15 R YTB  Standing TKE with ball on wall 3\" hold x15 R  6\" step up 3x10 R  STS 3x10    MT: x10'  Supine patellar glides all direction GII-III R  tib-fem glides AP GII-III R  Gentle over pressure into flexion and extension R knee TE: x20'   Supine BKTC with swiss ball 3x10   Supine HS stretch with green strap 3x30s R  Heel slides with green strap 3x10 R  Prone knee hang 3# 3'  SL shuttle leg press 62# 3x10 R   Slantboard stretch 3x30s   Standing on 6 in step knee flexion AAROM 3x10     NMR: x15'  SLR with QS x10 R  SL hip abduction x10 R  SL clam x15 R YTB  Standing TKE with ball on wall 3\" hold x15 R  6\" step up 3x10 R  STS 3x10    MT: x10'  Supine  patellar glides all direction GII-III R  tib-fem glides AP GII-III R  Gentle over pressure into flexion and extension R knee TE: x20'   Supine BKTC with swiss ball 3x10   Supine piriformis stretch 3x30s ea bilat   SL shuttle leg press 62# 3x10 R   Standing on 6 in step knee flexion AAROM 3x10   Hurdles FWD/Side 6 laps ea     NMR: x15'  SLR with QS 2x10 R  SL hip abduction x10 R  SL clam 2x10 R YTB  6\" step up 3x10 R  STS 3x10  Standing TKE with GTB 3x10     MT: x10'  Supine patellar glides all direction GII-III R  tib-fem glides AP GII-III R  STM in the R glut  TE: x20'   Supine BKTC with swiss ball 3x10   Supine piriformis stretch 3x30s ea bilat   SL shuttle leg press 62# 3x10 R   Standing on 6 in step knee flexion AAROM 3x10   Hurdles FWD/Side 6 laps ea     NMR: x15'  SLR with QS 2x10 R  SL hip abduction 2x10 R  SL clam 2x10 R YTB  6\" step up 3x10 R  STS 3x10  Standing TKE with GTB 3x10   Prone hangs 3 min.    MT: x10'  Supine patellar glides all direction GII-III R  tib-fem glides AP GII-III R  STM in the R glut   Incisional scar mob    HEP: heel slides, gastroc stretch, hamstring stretch, quad sets     Charges: 1 TE 1 NMR 1 MT       Total Timed Treatment: 45 min  Total Treatment Time: 45 min

## 2024-10-08 ENCOUNTER — OFFICE VISIT (OUTPATIENT)
Dept: PHYSICAL THERAPY | Facility: HOSPITAL | Age: 59
End: 2024-10-08
Payer: MEDICAID

## 2024-10-08 PROCEDURE — 97112 NEUROMUSCULAR REEDUCATION: CPT | Performed by: PHYSICAL THERAPIST

## 2024-10-08 PROCEDURE — 97110 THERAPEUTIC EXERCISES: CPT | Performed by: PHYSICAL THERAPIST

## 2024-10-08 PROCEDURE — 97140 MANUAL THERAPY 1/> REGIONS: CPT | Performed by: PHYSICAL THERAPIST

## 2024-10-08 NOTE — PROGRESS NOTES
Diagnosis:   Orthopedic aftercare (Z47.89)  S/P TKR (total knee replacement) using cement, right   (Z96.651)  Primary osteoarthritis of right knee (M17.11)      Referring Provider: Shameka Phipps  Date of Evaluation:    9/5/2024    Precautions: diabetes Next MD visit:   None scheduled   Date of Surgery: 8/8/24     Insurance Primary/Secondary: BLUE CROSS MEDICAID / N/A     # Auth Visits: 15v (exp 11/4/24)            Subjective: Patient is Khmer speaking so language line solution was used. She reports continuing pain in her R knee, but the pain is getting better.      Pain: 3/10 R glut & 5/10 R knee        Objective: see rx flowsheet    R knee flexion 125 with heel slides   R knee extension: -4    9/10/24:   Observation: healed, clean incision; 2 small dry scabs/eschar at most proximal incision point, 2 very small scabs at the distal point of the incision  Girth: L 36cm; L 40.5 cm (at knee jt line)   Palpation: 2-3+ pitting edema at the anterior knee joint (with patellar mobs), increased warmth  Gait mechanics: can ambulate without an AD with good mechanics, mild antalgia and trendelenburg during R stance    R knee passive ROM:  Flexion: 110  Extension: -8 resting; -5 passively    Strength/MMT: (* denotes performed with pain)  Hip Knee   IE  Flexion: R 4/5; L 4/5  Extension: R 3/5; L 3/5  Abduction: R 3/5; L 3/5  ER: R 3/5; L 3/5 IE  Flexion: R 3/5; L 5/5  Extension: R 4/5; L 5/5        IE Balance: SLS: R 3 sec, L 5 sec      Assessment: Patient was having significant pain in her hamstring and calf while performing SLR exercise today. Exercises were modified today due to increase in pain. STM and stretching exercises were able to improve her symptoms post-treatment. She was educated on new stretches for her HEP so that she can improve her pain.    Goals:   Pt will improve knee extension ROM to 0 deg to allow proper heel strike during gait and terminal knee extension in stance  Pt will improve knee AROM flexion to >128  degrees to improve ability to perform bending  Pt will improve quad strength to 5/5 to ascend 1 flight of stairs reciprocally without UE assist  Pt will increase hip and knee strength to grossly 4+/5 to be able to get up and down from the floor safely  Pt will demonstrate increased hip ER/ABD strength to 4/5 to perform stepping and squatting activities without excessive femoral IR/ADD  Pt will improve SLS to >5s to improve safety and independence with gait on uneven surfaces such as grass  Pt will be independent and compliant with comprehensive HEP to maintain progress achieved in PT    Plan: Patient will be seen for 2 x/week or a total of 8 visits over a 90 day period.  Treatment will include: Gait training, Manual Therapy, Neuromuscular Re-education, Therapeutic Activities, Therapeutic Exercise, and Home Exercise Program instruction  Progress range of motion, patellar mobility, quad activation; hurdles 6\" when able, gait mechanics, stair negotiation   Date: 9/24/24  Tx#: 6/15 Date: 9/26/24  Tx#: 7/15 Date: 10/2/24  Tx#: 8/15 Date: 10/4/24  Tx#: 9/15 Date: 10/8/24  Tx#: 9/15   TE: x20'   Supine BKTC with swiss ball 3x10   Supine HS stretch with green strap 3x30s R  Heel slides with green strap 3x10 R  Prone knee hang 3# 3'  SL shuttle leg press 62# 3x10 R   Slantboard stretch 3x30s   Standing on 6 in step knee flexion AAROM 3x10     NMR: x15'  SLR with QS x10 R  SL hip abduction x10 R  SL clam x15 R YTB  Standing TKE with ball on wall 3\" hold x15 R  6\" step up 3x10 R  STS 3x10    MT: x10'  Supine patellar glides all direction GII-III R  tib-fem glides AP GII-III R  Gentle over pressure into flexion and extension R knee TE: x20'   Supine BKTC with swiss ball 3x10   Supine HS stretch with green strap 3x30s R  Heel slides with green strap 3x10 R  Prone knee hang 3# 3'  SL shuttle leg press 62# 3x10 R   Slantboard stretch 3x30s   Standing on 6 in step knee flexion AAROM 3x10     NMR: x15'  SLR with QS x10 R  SL hip  abduction x10 R  SL clam x15 R YTB  Standing TKE with ball on wall 3\" hold x15 R  6\" step up 3x10 R  STS 3x10    MT: x10'  Supine patellar glides all direction GII-III R  tib-fem glides AP GII-III R  Gentle over pressure into flexion and extension R knee TE: x20'   Supine BKTC with swiss ball 3x10   Supine piriformis stretch 3x30s ea bilat   SL shuttle leg press 62# 3x10 R   Standing on 6 in step knee flexion AAROM 3x10   Hurdles FWD/Side 6 laps ea     NMR: x15'  SLR with QS 2x10 R  SL hip abduction x10 R  SL clam 2x10 R YTB  6\" step up 3x10 R  STS 3x10  Standing TKE with GTB 3x10     MT: x10'  Supine patellar glides all direction GII-III R  tib-fem glides AP GII-III R  STM in the R glut  TE: x20'   Supine BKTC with swiss ball 3x10   Supine piriformis stretch 3x30s ea bilat   SL shuttle leg press 62# 3x10 R   Standing on 6 in step knee flexion AAROM 3x10   Hurdles FWD/Side 6 laps ea     NMR: x15'  SLR with QS 2x10 R  SL hip abduction 2x10 R  SL clam 2x10 R YTB  6\" step up 3x10 R  STS 3x10  Standing TKE with GTB 3x10   Prone hangs 3 min.    MT: x10'  Supine patellar glides all direction GII-III R  tib-fem glides AP GII-III R  STM in the R glut   Incisional scar mob  TE: x15'   Supine piriformis stretch 3x30s ea bilat   SL shuttle leg press 62# 3x10 R   Hurdles FWD/Side 6 laps ea   Hamstring stretch 3x30s R   Incline gastroc stretch      NMR: x15'  6\" step up 3x10 R  STS 3x10   Standing TKE with GTB 3x10   Prone hangs 3 min.    MT: x15'  Supine patellar glides all direction GII-III R  tib-fem glides AP GII-III R  STM in the R glut, hamstrings, & gastrocs   HEP: heel slides, gastroc stretch, hamstring stretch, quad sets     Charges: 1 TE 1 NMR 1 MT       Total Timed Treatment: 45 min  Total Treatment Time: 45 min

## 2024-10-09 NOTE — PROGRESS NOTES
Diagnosis:   Orthopedic aftercare (Z47.89)  S/P TKR (total knee replacement) using cement, right   (Z96.651)  Primary osteoarthritis of right knee (M17.11)      Referring Provider: Shameka Phipps  Date of Evaluation:    9/5/2024    Precautions: diabetes Next MD visit:   None scheduled   Date of Surgery: 8/8/24     Insurance Primary/Secondary: BLUE CROSS MEDICAID / N/A     # Auth Visits: 15v (exp 11/4/24)            Subjective: Patient is Turkmen speaking so language line solution was used. Pt is working with the pain. Reports new onset of right shoulder pain down the arm. Took a pain pill before she came today.   [employer] #ID: [id number] Renae     Pain: 4/10 R glut to R knee & 4/10 R knee        Objective: see rx flowsheet    Oct 10/10/24 R knee AROM(PROM)   flexion: 119 (120)  extension: -5 (-4)         Strength/MMT: (* denotes performed with pain)  Hip Knee   IE  Flexion: R 4/5; L 4/5  Extension: R 3/5; L 3/5  Abduction: R 3/5; L 3/5  ER: R 3/5; L 3/5 IE  Flexion: R 3/5; L 5/5  Extension: R 4/5; L 5/5        IE Balance: SLS: R 3 sec, L 5 sec      Assessment: Contract-relax reduced muscle restriction and pain levels in the HS and quad. Pt lacks full extension- to continue to promote increased ROM and improve functional mobility.    Goals:   Pt will improve knee extension ROM to 0 deg to allow proper heel strike during gait and terminal knee extension in stance  Pt will improve knee AROM flexion to >128 degrees to improve ability to perform bending  Pt will improve quad strength to 5/5 to ascend 1 flight of stairs reciprocally without UE assist  Pt will increase hip and knee strength to grossly 4+/5 to be able to get up and down from the floor safely  Pt will demonstrate increased hip ER/ABD strength to 4/5 to perform stepping and squatting activities without excessive femoral IR/ADD  Pt will improve SLS to >5s to improve safety and independence with gait on uneven surfaces such as grass  Pt will be  independent and compliant with comprehensive HEP to maintain progress achieved in PT    Plan: Patient will be seen for 2 x/week or a total of 8 visits over a 90 day period.  Treatment will include: Gait training, Manual Therapy, Neuromuscular Re-education, Therapeutic Activities, Therapeutic Exercise, and Home Exercise Program instruction  Progress range of motion, patellar mobility, quad activation; hurdles 6\" when able, gait mechanics, stair negotiation   Date: 9/26/24  Tx#: 7/15 Date: 10/2/24  Tx#: 8/15 Date: 10/4/24  Tx#: 9/15 Date: 10/8/24  Tx#: 9/15 Date: 10/10/24  Tx#: 10/15   TE: x20'   Supine BKTC with swiss ball 3x10   Supine HS stretch with green strap 3x30s R  Heel slides with green strap 3x10 R  Prone knee hang 3# 3'  SL shuttle leg press 62# 3x10 R   Slantboard stretch 3x30s   Standing on 6 in step knee flexion AAROM 3x10     NMR: x15'  SLR with QS x10 R  SL hip abduction x10 R  SL clam x15 R YTB  Standing TKE with ball on wall 3\" hold x15 R  6\" step up 3x10 R  STS 3x10    MT: x10'  Supine patellar glides all direction GII-III R  tib-fem glides AP GII-III R  Gentle over pressure into flexion and extension R knee TE: x20'   Supine BKTC with swiss ball 3x10   Supine piriformis stretch 3x30s ea bilat   SL shuttle leg press 62# 3x10 R   Standing on 6 in step knee flexion AAROM 3x10   Hurdles FWD/Side 6 laps ea     NMR: x15'  SLR with QS 2x10 R  SL hip abduction x10 R  SL clam 2x10 R YTB  6\" step up 3x10 R  STS 3x10  Standing TKE with GTB 3x10     MT: x10'  Supine patellar glides all direction GII-III R  tib-fem glides AP GII-III R  STM in the R glut  TE: x20'   Supine BKTC with swiss ball 3x10   Supine piriformis stretch 3x30s ea bilat   SL shuttle leg press 62# 3x10 R   Standing on 6 in step knee flexion AAROM 3x10   Hurdles FWD/Side 6 laps ea     NMR: x15'  SLR with QS 2x10 R  SL hip abduction 2x10 R  SL clam 2x10 R YTB  6\" step up 3x10 R  STS 3x10  Standing TKE with GTB 3x10   Prone hangs 3 min.    MT:  x10'  Supine patellar glides all direction GII-III R  tib-fem glides AP GII-III R  STM in the R glut   Incisional scar mob  TE: x15'   Supine piriformis stretch 3x30s ea bilat   SL shuttle leg press 62# 3x10 R   Hurdles FWD/Side 6 laps ea   Hamstring stretch 3x30s R   Incline gastroc stretch      NMR: x15'  6\" step up 3x10 R  STS 3x10   Standing TKE with GTB 3x10   Prone hangs 3 min.    MT: x15'  Supine patellar glides all direction GII-III R  tib-fem glides AP GII-III R  STM in the R glut, hamstrings, & gastrocs TE: x13'   Supine piriformis stretch 5x10\" ea bilat   Supine DKTC x20   SB bridge 2x10    TRX squat x20      NMR: x15'  Contract-relax HS R 3x10\" (supine SLR position)  Contract relax quad  3x10\" R (prone Elys position)  Prone hangs 3 min. 4#  SLS cone taps x20   6\" hurdles: step to R and L, reciprocal and lateral x2 laps ea    MT: x15'  Supine patellar glides all direction GIII R  tib-fem glides AP GII-III R  Gentle over pressure into right knee flexion and extension   STM R HS and passive flexion in prone   HEP: heel slides, gastroc stretch, hamstring stretch, quad sets     Charges: 1 TE 1 NMR 1 MT       Total Timed Treatment: 43 min  Total Treatment Time: 43 min

## 2024-10-10 ENCOUNTER — OFFICE VISIT (OUTPATIENT)
Dept: PHYSICAL THERAPY | Facility: HOSPITAL | Age: 59
End: 2024-10-10
Payer: MEDICAID

## 2024-10-10 PROCEDURE — 97110 THERAPEUTIC EXERCISES: CPT

## 2024-10-10 PROCEDURE — 97112 NEUROMUSCULAR REEDUCATION: CPT

## 2024-10-10 PROCEDURE — 97140 MANUAL THERAPY 1/> REGIONS: CPT

## 2024-10-11 NOTE — PROGRESS NOTES
Diagnosis:   Orthopedic aftercare (Z47.89)  S/P TKR (total knee replacement) using cement, right   (Z96.651)  Primary osteoarthritis of right knee (M17.11)      Referring Provider: Shameka Phipps  Date of Evaluation:    9/5/2024    Precautions: diabetes Next MD visit:   None scheduled   Date of Surgery: 8/8/24     Insurance Primary/Secondary: BLUE CROSS MEDICAID / N/A     # Auth Visits: 15v (exp 11/4/24)            Subjective: Patient is Sinhala speaking so language line solution was used. Pt feels a little better, but still has a little bit of pain.    [employer] #ID: [id number]     Pain: 4/10 R knee        Objective: see rx flowsheet    Oct 10/10/24 R knee AROM(PROM)   flexion: 119 (120)  extension: -5 (-4)         Strength/MMT: (* denotes performed with pain)  Hip Knee   IE  Flexion: R 4/5; L 4/5  Extension: R 3/5; L 3/5  Abduction: R 3/5; L 3/5  ER: R 3/5; L 3/5 IE  Flexion: R 3/5; L 5/5  Extension: R 4/5; L 5/5        IE Balance: SLS: R 3 sec, L 5 sec      Assessment: Pt with increased swelling in right knee and increased pain with max flexion. Limited knee extension.     Goals:   Pt will improve knee extension ROM to 0 deg to allow proper heel strike during gait and terminal knee extension in stance  Pt will improve knee AROM flexion to >128 degrees to improve ability to perform bending  Pt will improve quad strength to 5/5 to ascend 1 flight of stairs reciprocally without UE assist  Pt will increase hip and knee strength to grossly 4+/5 to be able to get up and down from the floor safely  Pt will demonstrate increased hip ER/ABD strength to 4/5 to perform stepping and squatting activities without excessive femoral IR/ADD  Pt will improve SLS to >5s to improve safety and independence with gait on uneven surfaces such as grass  Pt will be independent and compliant with comprehensive HEP to maintain progress achieved in PT    Plan: Patient will be seen for 2 x/week or a total of 8 visits over a 90 day  period.  Treatment will include: Gait training, Manual Therapy, Neuromuscular Re-education, Therapeutic Activities, Therapeutic Exercise, and Home Exercise Program instruction  Progress range of motion, patellar mobility, quad activation; hurdles 6\" when able, gait mechanics, stair negotiation   Date: 9/26/24  Tx#: 7/15 Date: 10/2/24  Tx#: 8/15 Date: 10/4/24  Tx#: 9/15 Date: 10/8/24  Tx#: 9/15 Date: 10/10/24  Tx#: 10/15 Date: 10/14/24  Tx#: 11/15   TE: x20'   Supine BKTC with swiss ball 3x10   Supine HS stretch with green strap 3x30s R  Heel slides with green strap 3x10 R  Prone knee hang 3# 3'  SL shuttle leg press 62# 3x10 R   Slantboard stretch 3x30s   Standing on 6 in step knee flexion AAROM 3x10     NMR: x15'  SLR with QS x10 R  SL hip abduction x10 R  SL clam x15 R YTB  Standing TKE with ball on wall 3\" hold x15 R  6\" step up 3x10 R  STS 3x10    MT: x10'  Supine patellar glides all direction GII-III R  tib-fem glides AP GII-III R  Gentle over pressure into flexion and extension R knee TE: x20'   Supine BKTC with swiss ball 3x10   Supine piriformis stretch 3x30s ea bilat   SL shuttle leg press 62# 3x10 R   Standing on 6 in step knee flexion AAROM 3x10   Hurdles FWD/Side 6 laps ea     NMR: x15'  SLR with QS 2x10 R  SL hip abduction x10 R  SL clam 2x10 R YTB  6\" step up 3x10 R  STS 3x10  Standing TKE with GTB 3x10     MT: x10'  Supine patellar glides all direction GII-III R  tib-fem glides AP GII-III R  STM in the R glut  TE: x20'   Supine BKTC with swiss ball 3x10   Supine piriformis stretch 3x30s ea bilat   SL shuttle leg press 62# 3x10 R   Standing on 6 in step knee flexion AAROM 3x10   Hurdles FWD/Side 6 laps ea     NMR: x15'  SLR with QS 2x10 R  SL hip abduction 2x10 R  SL clam 2x10 R YTB  6\" step up 3x10 R  STS 3x10  Standing TKE with GTB 3x10   Prone hangs 3 min.    MT: x10'  Supine patellar glides all direction GII-III R  tib-fem glides AP GII-III R  STM in the R glut   Incisional scar mob  TE: x15'    Supine piriformis stretch 3x30s ea bilat   SL shuttle leg press 62# 3x10 R   Hurdles FWD/Side 6 laps ea   Hamstring stretch 3x30s R   Incline gastroc stretch      NMR: x15'  6\" step up 3x10 R  STS 3x10   Standing TKE with GTB 3x10   Prone hangs 3 min.    MT: x15'  Supine patellar glides all direction GII-III R  tib-fem glides AP GII-III R  STM in the R glut, hamstrings, & gastrocs TE: x13'   Supine piriformis stretch 5x10\" ea bilat   Supine DKTC x20   SB bridge 2x10    TRX squat x20      NMR: x15'  Contract-relax HS R 3x10\" (supine SLR position)  Contract relax quad  3x10\" R (prone Elys position)  Prone hangs 3 min. 4#  SLS cone taps x20   6\" hurdles: step to R and L, reciprocal and lateral x2 laps ea    MT: x15'  Supine patellar glides all direction GIII R  tib-fem glides AP GII-III R  Gentle over pressure into right knee flexion and extension   STM R HS and passive flexion in prone TE: x15'   Nustep lvl 3 arms and legs x5'    Prone quad stretch 10x10\" R  Tubi- and swelling discussion    NMR: x12'  Seated knee ext hang 4# x2' 9# x2'  FSUO 6\" R lead x20    MT: x13'  Supine patellar glides all direction GIII R  tib-fem glides AP GII-III R  Gentle over pressure into right knee flexion and extension   STM R HS and passive flexion in prone   HEP: heel slides, gastroc stretch, hamstring stretch, quad sets     Charges: 1 TE 1 NMR 1 MT       Total Timed Treatment: 40 min  Total Treatment Time: 40 min

## 2024-10-14 ENCOUNTER — OFFICE VISIT (OUTPATIENT)
Dept: PHYSICAL THERAPY | Facility: HOSPITAL | Age: 59
End: 2024-10-14
Payer: MEDICAID

## 2024-10-14 PROCEDURE — 97110 THERAPEUTIC EXERCISES: CPT

## 2024-10-14 PROCEDURE — 97140 MANUAL THERAPY 1/> REGIONS: CPT

## 2024-10-14 PROCEDURE — 97112 NEUROMUSCULAR REEDUCATION: CPT

## 2024-10-16 NOTE — PROGRESS NOTES
Diagnosis:   Orthopedic aftercare (Z47.89)  S/P TKR (total knee replacement) using cement, right   (Z96.651)  Primary osteoarthritis of right knee (M17.11)      Referring Provider: Shameka Phipps  Date of Evaluation:    9/5/2024    Precautions: diabetes Next MD visit:   None scheduled   Date of Surgery: 8/8/24     Insurance Primary/Secondary: BLUE CROSS MEDICAID / N/A     # Auth Visits: 15v (exp 11/4/24)            Subjective: Patient is Japanese speaking so language line solution was used. Pt says she is good. Knee feels good.   [employer] #ID: [id number]     Pain: 0/10 R knee        Objective: see rx flowsheet    Oct 10/10/24 R knee AROM(PROM)   flexion: 119 (120)  extension: -5 (-4)         Strength/MMT: (* denotes performed with pain)  Hip Knee   IE  Flexion: R 4/5; L 4/5  Extension: R 3/5; L 3/5  Abduction: R 3/5; L 3/5  ER: R 3/5; L 3/5 IE  Flexion: R 3/5; L 5/5  Extension: R 4/5; L 5/5        IE Balance: SLS: R 3 sec, L 5 sec      Assessment: Pt with increased swelling in right knee and limited extension. Pt is making good progress overall- will benefit from continued focus on ROM and functional LE strengthening.     Goals:   Pt will improve knee extension ROM to 0 deg to allow proper heel strike during gait and terminal knee extension in stance  Pt will improve knee AROM flexion to >128 degrees to improve ability to perform bending  Pt will improve quad strength to 5/5 to ascend 1 flight of stairs reciprocally without UE assist  Pt will increase hip and knee strength to grossly 4+/5 to be able to get up and down from the floor safely  Pt will demonstrate increased hip ER/ABD strength to 4/5 to perform stepping and squatting activities without excessive femoral IR/ADD  Pt will improve SLS to >5s to improve safety and independence with gait on uneven surfaces such as grass  Pt will be independent and compliant with comprehensive HEP to maintain progress achieved in PT    Plan: Patient will be seen for 2  x/week or a total of 8 visits over a 90 day period.  Treatment will include: Gait training, Manual Therapy, Neuromuscular Re-education, Therapeutic Activities, Therapeutic Exercise, and Home Exercise Program instruction  Progress range of motion, patellar mobility, quad activation; hurdles 6\" when able, gait mechanics, stair negotiation   Date: 10/2/24  Tx#: 8/15 Date: 10/4/24  Tx#: 9/15 Date: 10/8/24  Tx#: 9/15 Date: 10/10/24  Tx#: 10/15 Date: 10/14/24  Tx#: 11/15 Date: 10/17/24  Tx#: 12/15   TE: x20'   Supine BKTC with swiss ball 3x10   Supine piriformis stretch 3x30s ea bilat   SL shuttle leg press 62# 3x10 R   Standing on 6 in step knee flexion AAROM 3x10   Hurdles FWD/Side 6 laps ea     NMR: x15'  SLR with QS 2x10 R  SL hip abduction x10 R  SL clam 2x10 R YTB  6\" step up 3x10 R  STS 3x10  Standing TKE with GTB 3x10     MT: x10'  Supine patellar glides all direction GII-III R  tib-fem glides AP GII-III R  STM in the R glut  TE: x20'   Supine BKTC with swiss ball 3x10   Supine piriformis stretch 3x30s ea bilat   SL shuttle leg press 62# 3x10 R   Standing on 6 in step knee flexion AAROM 3x10   Hurdles FWD/Side 6 laps ea     NMR: x15'  SLR with QS 2x10 R  SL hip abduction 2x10 R  SL clam 2x10 R YTB  6\" step up 3x10 R  STS 3x10  Standing TKE with GTB 3x10   Prone hangs 3 min.    MT: x10'  Supine patellar glides all direction GII-III R  tib-fem glides AP GII-III R  STM in the R glut   Incisional scar mob  TE: x15'   Supine piriformis stretch 3x30s ea bilat   SL shuttle leg press 62# 3x10 R   Hurdles FWD/Side 6 laps ea   Hamstring stretch 3x30s R   Incline gastroc stretch      NMR: x15'  6\" step up 3x10 R  STS 3x10   Standing TKE with GTB 3x10   Prone hangs 3 min.    MT: x15'  Supine patellar glides all direction GII-III R  tib-fem glides AP GII-III R  STM in the R glut, hamstrings, & gastrocs TE: x13'   Supine piriformis stretch 5x10\" ea bilat   Supine DKTC x20   SB bridge 2x10    TRX squat x20      NMR:  x15'  Contract-relax HS R 3x10\" (supine SLR position)  Contract relax quad  3x10\" R (prone Elys position)  Prone hangs 3 min. 4#  SLS cone taps x20   6\" hurdles: step to R and L, reciprocal and lateral x2 laps ea    MT: x15'  Supine patellar glides all direction GIII R  tib-fem glides AP GII-III R  Gentle over pressure into right knee flexion and extension   STM R HS and passive flexion in prone TE: x15'   Nustep lvl 3 arms and legs x5'    Prone quad stretch 10x10\" R  Tubi- and swelling discussion    NMR: x12'  Seated knee ext hang 4# x2' 9# x2'  FSUO 6\" R lead x20    MT: x13'  Supine patellar glides all direction GIII R  tib-fem glides AP GII-III R  Gentle over pressure into right knee flexion and extension   STM R HS and passive flexion in prone TE: x18'   Shuttle # x20   Shuttle SLP 55# x20 bilat    Prone quad stretch 10x10\" R  Seated HS stretching 5x10\" R  Slantboard stretch 5x10\"     NMR: x10'  Seated knee ext hang 5# R x3'   Stair negotiation in hallway x2  STS blue airex x20     MT: x12'  Supine patellar glides all direction GIII R  tib-fem glides AP GII-III R  Gentle over pressure into right knee flexion and extension   STM R ITB near distal femur   HEP: heel slides, gastroc stretch, hamstring stretch, quad sets     Charges: 1 TE 1 NMR 1 MT       Total Timed Treatment: 40 min  Total Treatment Time: 40 min

## 2024-10-17 ENCOUNTER — OFFICE VISIT (OUTPATIENT)
Dept: PHYSICAL THERAPY | Facility: HOSPITAL | Age: 59
End: 2024-10-17
Payer: MEDICAID

## 2024-10-17 PROCEDURE — 97140 MANUAL THERAPY 1/> REGIONS: CPT

## 2024-10-17 PROCEDURE — 97112 NEUROMUSCULAR REEDUCATION: CPT

## 2024-10-17 PROCEDURE — 97110 THERAPEUTIC EXERCISES: CPT

## 2024-10-18 NOTE — PROGRESS NOTES
Diagnosis:   Orthopedic aftercare (Z47.89)  S/P TKR (total knee replacement) using cement, right   (Z96.651)  Primary osteoarthritis of right knee (M17.11)      Referring Provider: Shameka Phipps  Date of Evaluation:    9/5/2024    Precautions: diabetes Next MD visit:   None scheduled   Date of Surgery: 8/8/24     Insurance Primary/Secondary: BLUE CROSS MEDICAID / N/A     # Auth Visits: 15v (exp 11/4/24)            Subjective: Pt arrives 15 minutes late. Patient is [place] citizen speaking so language line solution was used. Pt says the knee feels good.   [employer] #ID: [id number]     Pain: 0/10 R knee        Objective: see rx flowsheet    Oct 10/10/24 R knee AROM(PROM)   flexion: 119 (120)  extension: -5 (-4)         Strength/MMT: (* denotes performed with pain)  Hip Knee   IE  Flexion: R 4/5; L 4/5  Extension: R 3/5; L 3/5  Abduction: R 3/5; L 3/5  ER: R 3/5; L 3/5 IE  Flexion: R 3/5; L 5/5  Extension: R 4/5; L 5/5        IE Balance: SLS: R 3 sec, L 5 sec      Assessment: Pt is unable to obtain full right knee extension and flexion is limited to 115-120 with passive over pressure. Functionally, she is progressing very well.    Goals:   Pt will improve knee extension ROM to 0 deg to allow proper heel strike during gait and terminal knee extension in stance  Pt will improve knee AROM flexion to >128 degrees to improve ability to perform bending  Pt will improve quad strength to 5/5 to ascend 1 flight of stairs reciprocally without UE assist  Pt will increase hip and knee strength to grossly 4+/5 to be able to get up and down from the floor safely  Pt will demonstrate increased hip ER/ABD strength to 4/5 to perform stepping and squatting activities without excessive femoral IR/ADD  Pt will improve SLS to >5s to improve safety and independence with gait on uneven surfaces such as grass  Pt will be independent and compliant with comprehensive HEP to maintain progress achieved in PT    Plan: Patient will be seen for 2  x/week or a total of 8 visits over a 90 day period.  Treatment will include: Gait training, Manual Therapy, Neuromuscular Re-education, Therapeutic Activities, Therapeutic Exercise, and Home Exercise Program instruction   Date: 10/4/24  Tx#: 9/15 Date: 10/8/24  Tx#: 9/15 Date: 10/10/24  Tx#: 10/15 Date: 10/14/24  Tx#: 11/15 Date: 10/17/24  Tx#: 12/15 Date: 10/21/24  Tx#: 13/15   TE: x20'   Supine BKTC with swiss ball 3x10   Supine piriformis stretch 3x30s ea bilat   SL shuttle leg press 62# 3x10 R   Standing on 6 in step knee flexion AAROM 3x10   Hurdles FWD/Side 6 laps ea     NMR: x15'  SLR with QS 2x10 R  SL hip abduction 2x10 R  SL clam 2x10 R YTB  6\" step up 3x10 R  STS 3x10  Standing TKE with GTB 3x10   Prone hangs 3 min.    MT: x10'  Supine patellar glides all direction GII-III R  tib-fem glides AP GII-III R  STM in the R glut   Incisional scar mob  TE: x15'   Supine piriformis stretch 3x30s ea bilat   SL shuttle leg press 62# 3x10 R   Hurdles FWD/Side 6 laps ea   Hamstring stretch 3x30s R   Incline gastroc stretch      NMR: x15'  6\" step up 3x10 R  STS 3x10   Standing TKE with GTB 3x10   Prone hangs 3 min.    MT: x15'  Supine patellar glides all direction GII-III R  tib-fem glides AP GII-III R  STM in the R glut, hamstrings, & gastrocs TE: x13'   Supine piriformis stretch 5x10\" ea bilat   Supine DKTC x20   SB bridge 2x10    TRX squat x20      NMR: x15'  Contract-relax HS R 3x10\" (supine SLR position)  Contract relax quad  3x10\" R (prone Elys position)  Prone hangs 3 min. 4#  SLS cone taps x20   6\" hurdles: step to R and L, reciprocal and lateral x2 laps ea    MT: x15'  Supine patellar glides all direction GIII R  tib-fem glides AP GII-III R  Gentle over pressure into right knee flexion and extension   STM R HS and passive flexion in prone TE: x15'   Nustep lvl 3 arms and legs x5'    Prone quad stretch 10x10\" R  Tubi- and swelling discussion    NMR: x12'  Seated knee ext hang 4# x2' 9# x2'  FSUO 6\" R lead  x20    MT: x13'  Supine patellar glides all direction GIII R  tib-fem glides AP GII-III R  Gentle over pressure into right knee flexion and extension   STM R HS and passive flexion in prone TE: x18'   Shuttle # x20   Shuttle SLP 55# x20 bilat    Prone quad stretch 10x10\" R  Seated HS stretching 5x10\" R  Slantboard stretch 5x10\"     NMR: x10'  Seated knee ext hang 5# R x3'   Stair negotiation in hallway x2  STS blue airex x20     MT: x12'  Supine patellar glides all direction GIII R  tib-fem glides AP GII-III R  Gentle over pressure into right knee flexion and extension   STM R ITB near distal femur TE: x12'   Nustep lvl 5 x5' (arms and legs)   Shuttle # x20   Shuttle SLP 62# x20 bilat      NMR: x8'  Seated knee ext hang 5# R x5'  Standing TKE GTB x20 R     MT: x10'  Supine patellar glides all direction GIII R  tib-fem glides AP GII-III R  Gentle over pressure into right knee flexion and extension    HEP: heel slides, gastroc stretch, hamstring stretch, quad sets     Charges: 1 TE 1 NMR 1 MT       Total Timed Treatment: 30 min  Total Treatment Time: 30 min

## 2024-10-21 ENCOUNTER — OFFICE VISIT (OUTPATIENT)
Dept: PHYSICAL THERAPY | Facility: HOSPITAL | Age: 59
End: 2024-10-21
Payer: MEDICAID

## 2024-10-21 PROCEDURE — 97140 MANUAL THERAPY 1/> REGIONS: CPT

## 2024-10-21 PROCEDURE — 97110 THERAPEUTIC EXERCISES: CPT

## 2024-10-21 PROCEDURE — 97112 NEUROMUSCULAR REEDUCATION: CPT

## 2024-10-23 ENCOUNTER — OFFICE VISIT (OUTPATIENT)
Dept: PHYSICAL THERAPY | Facility: HOSPITAL | Age: 59
End: 2024-10-23
Payer: MEDICAID

## 2024-10-23 PROCEDURE — 97110 THERAPEUTIC EXERCISES: CPT | Performed by: PHYSICAL THERAPIST

## 2024-10-23 PROCEDURE — 97140 MANUAL THERAPY 1/> REGIONS: CPT | Performed by: PHYSICAL THERAPIST

## 2024-10-23 PROCEDURE — 97530 THERAPEUTIC ACTIVITIES: CPT | Performed by: PHYSICAL THERAPIST

## 2024-10-23 NOTE — PROGRESS NOTES
Diagnosis:   Orthopedic aftercare (Z47.89)  S/P TKR (total knee replacement) using cement, right   (Z96.651)  Primary osteoarthritis of right knee (M17.11)      Referring Provider: Shameka Phipps  Date of Evaluation:    9/5/2024    Precautions: diabetes Next MD visit:   None scheduled   Date of Surgery: 8/8/24     Insurance Primary/Secondary: BLUE CROSS MEDICAID / N/A     # Auth Visits: 15v (exp 11/4/24)            Subjective: Patient is Ukrainian speaking so language line solution was used. Patient states no pain today.  [employer] #ID: [id number]     Pain: 0/10 R knee        Objective: see rx flowsheet    Oct 10/10/24 R knee AROM(PROM)   flexion: 119 (120)  extension: -5 (-4)         Strength/MMT: (* denotes performed with pain)  Hip Knee   IE  Flexion: R 4/5; L 4/5  Extension: R 3/5; L 3/5  Abduction: R 3/5; L 3/5  ER: R 3/5; L 3/5 IE  Flexion: R 3/5; L 5/5  Extension: R 4/5; L 5/5        IE Balance: SLS: R 3 sec, L 5 sec      Assessment: Pt is unable to obtain full right knee extension and flexion is limited to 115-120 with passive over pressure. Functionally, she is progressing very well.    Goals:   Pt will improve knee extension ROM to 0 deg to allow proper heel strike during gait and terminal knee extension in stance  Pt will improve knee AROM flexion to >128 degrees to improve ability to perform bending  Pt will improve quad strength to 5/5 to ascend 1 flight of stairs reciprocally without UE assist  Pt will increase hip and knee strength to grossly 4+/5 to be able to get up and down from the floor safely  Pt will demonstrate increased hip ER/ABD strength to 4/5 to perform stepping and squatting activities without excessive femoral IR/ADD  Pt will improve SLS to >5s to improve safety and independence with gait on uneven surfaces such as grass  Pt will be independent and compliant with comprehensive HEP to maintain progress achieved in PT    Plan: Patient will be seen for 2 x/week or a total of 8 visits  over a 90 day period.  Treatment will include: Gait training, Manual Therapy, Neuromuscular Re-education, Therapeutic Activities, Therapeutic Exercise, and Home Exercise Program instruction   Date: 10/4/24  Tx#: 9/15 Date: 10/8/24  Tx#: 9/15 Date: 10/10/24  Tx#: 10/15 Date: 10/14/24  Tx#: 11/15 Date: 10/17/24  Tx#: 12/15 Date: 10/21/24  Tx#: 13/15 Date: 10/23/24  Tx#: 14/15   TE: x20'   Supine BKTC with swiss ball 3x10   Supine piriformis stretch 3x30s ea bilat   SL shuttle leg press 62# 3x10 R   Standing on 6 in step knee flexion AAROM 3x10   Hurdles FWD/Side 6 laps ea     NMR: x15'  SLR with QS 2x10 R  SL hip abduction 2x10 R  SL clam 2x10 R YTB  6\" step up 3x10 R  STS 3x10  Standing TKE with GTB 3x10   Prone hangs 3 min.    MT: x10'  Supine patellar glides all direction GII-III R  tib-fem glides AP GII-III R  STM in the R glut   Incisional scar mob  TE: x15'   Supine piriformis stretch 3x30s ea bilat   SL shuttle leg press 62# 3x10 R   Hurdles FWD/Side 6 laps ea   Hamstring stretch 3x30s R   Incline gastroc stretch      NMR: x15'  6\" step up 3x10 R  STS 3x10   Standing TKE with GTB 3x10   Prone hangs 3 min.    MT: x15'  Supine patellar glides all direction GII-III R  tib-fem glides AP GII-III R  STM in the R glut, hamstrings, & gastrocs TE: x13'   Supine piriformis stretch 5x10\" ea bilat   Supine DKTC x20   SB bridge 2x10    TRX squat x20      NMR: x15'  Contract-relax HS R 3x10\" (supine SLR position)  Contract relax quad  3x10\" R (prone Elys position)  Prone hangs 3 min. 4#  SLS cone taps x20   6\" hurdles: step to R and L, reciprocal and lateral x2 laps ea    MT: x15'  Supine patellar glides all direction GIII R  tib-fem glides AP GII-III R  Gentle over pressure into right knee flexion and extension   STM R HS and passive flexion in prone TE: x15'   Nustep lvl 3 arms and legs x5'    Prone quad stretch 10x10\" R  Tubi- and swelling discussion    NMR: x12'  Seated knee ext hang 4# x2' 9# x2'  FSUO 6\" R lead  x20    MT: x13'  Supine patellar glides all direction GIII R  tib-fem glides AP GII-III R  Gentle over pressure into right knee flexion and extension   STM R HS and passive flexion in prone TE: x18'   Shuttle # x20   Shuttle SLP 55# x20 bilat    Prone quad stretch 10x10\" R  Seated HS stretching 5x10\" R  Slantboard stretch 5x10\"     NMR: x10'  Seated knee ext hang 5# R x3'   Stair negotiation in hallway x2  STS blue airex x20     MT: x12'  Supine patellar glides all direction GIII R  tib-fem glides AP GII-III R  Gentle over pressure into right knee flexion and extension   STM R ITB near distal femur TE: x12'   Nustep lvl 5 x5' (arms and legs)   Shuttle # x20   Shuttle SLP 62# x20 bilat      NMR: x8'  Seated knee ext hang 5# R x5'  Standing TKE GTB x20 R     MT: x10'  Supine patellar glides all direction GIII R  tib-fem glides AP GII-III R  Gentle over pressure into right knee flexion and extension  TE 20'  Shuttle # 3x10  Shuttle SLP 62# 3x10 bilat    Seated knee ext hang 5# R x5'  Standing TKE GTB 3x10 R   SLS + blaze pods taps   TRX mini squats 3x10   Seated HS stretching 3x30s\" R  Slantboard stretch 3x30s\"     TA 15'   Hurdles FWD/Side 6 laps ea   Negotiating stairs 10 laps   STS 3x10 with 1 airex      MT: x10'  Supine patellar glides all direction GIII R  tib-fem glides AP GII-III R  Gentle over pressure into right knee flexion and extension   HEP: heel slides, gastroc stretch, hamstring stretch, quad sets     Charges: 1 TE 1 MT 1 TA      Total Timed Treatment: 45 min  Total Treatment Time: 45 min

## 2024-10-29 ENCOUNTER — OFFICE VISIT (OUTPATIENT)
Dept: PHYSICAL THERAPY | Facility: HOSPITAL | Age: 59
End: 2024-10-29
Payer: MEDICAID

## 2024-10-29 DIAGNOSIS — Z96.651 STATUS POST TOTAL RIGHT KNEE REPLACEMENT: ICD-10-CM

## 2024-10-29 DIAGNOSIS — M17.11 PRIMARY OSTEOARTHRITIS OF RIGHT KNEE: ICD-10-CM

## 2024-10-29 PROCEDURE — 97110 THERAPEUTIC EXERCISES: CPT | Performed by: PHYSICAL THERAPIST

## 2024-10-29 PROCEDURE — 97140 MANUAL THERAPY 1/> REGIONS: CPT | Performed by: PHYSICAL THERAPIST

## 2024-10-29 PROCEDURE — 97530 THERAPEUTIC ACTIVITIES: CPT | Performed by: PHYSICAL THERAPIST

## 2024-10-29 NOTE — PROGRESS NOTES
Diagnosis:   Orthopedic aftercare (Z47.89)  S/P TKR (total knee replacement) using cement, right   (Z96.651)  Primary osteoarthritis of right knee (M17.11)      Referring Provider: Shameka Phipps  Date of Evaluation:    9/5/2024    Precautions: diabetes Next MD visit:   None scheduled   Date of Surgery: 8/8/24     Insurance Primary/Secondary: BLUE CROSS MEDICAID / N/A     # Auth Visits: 15v (exp 11/4/24)            Subjective: Patient is [place] speaking so language line solution was used. Patient reports no pain today, but does have some pain with bending.      Pain: 0/10 R knee        Objective: see rx flowsheet    Oct 10/10/24 R knee AROM(PROM)   flexion: 119 (120)  extension: -5 (-4)         Assessment: Pt has been progressing well in PT with improved pain and ROM in her knee. Patient's main complaint is still having pain and difficulty bending, but has been slowly improving with PT. She tolerated well with all exercises without any significant increase in pain.     Goals:   Pt will improve knee extension ROM to 0 deg to allow proper heel strike during gait and terminal knee extension in stance  Pt will improve knee AROM flexion to >128 degrees to improve ability to perform bending  Pt will improve quad strength to 5/5 to ascend 1 flight of stairs reciprocally without UE assist  Pt will increase hip and knee strength to grossly 4+/5 to be able to get up and down from the floor safely  Pt will demonstrate increased hip ER/ABD strength to 4/5 to perform stepping and squatting activities without excessive femoral IR/ADD  Pt will improve SLS to >5s to improve safety and independence with gait on uneven surfaces such as grass  Pt will be independent and compliant with comprehensive HEP to maintain progress achieved in PT    Plan: Patient will be seen for 2 x/week or a total of 8 visits over a 90 day period.  Treatment will include: Gait training, Manual Therapy, Neuromuscular Re-education, Therapeutic Activities,  Therapeutic Exercise, and Home Exercise Program instruction   Date: 10/23/24  Tx#: 15/15 Date: 10/29/24  Tx#: 16/22   TE 20'  Shuttle # 3x10  Shuttle SLP 62# 3x10 bilat    Seated knee ext hang 5# R x5'  Standing TKE GTB 3x10 R   SLS + blaze pods taps   TRX mini squats 3x10   Seated HS stretching 3x30s\" R  Slantboard stretch 3x30s\"     TA 15'   Hurdles FWD/Side 6 laps ea   Negotiating stairs 10 laps   STS 3x10 with 1 airex      MT: x10'  Supine patellar glides all direction GIII R  tib-fem glides AP GII-III R  Gentle over pressure into right knee flexion and extension TE 20'  Shuttle # 3x10  Shuttle SLP 62# 3x10 bilat    Seated knee ext hang 5# R x5'  Standing TKE GTB 3x10 R   SLS + blaze pods taps   TRX mini squats 3x10   Seated HS stretching 3x30s\" R  Slantboard stretch 3x30s\"   Standing R knee flexion AAROM on the stairs [id number]   TA 15'   Hurdles FWD/Side 6 laps ea   Negotiating stairs 10 laps   STS 3x10 with 1 airex      MT: x10'  Supine patellar glides all direction GIII R  tib-fem glides AP GII-III R  Gentle over pressure into right knee flexion and extension   HEP: heel slides, gastroc stretch, hamstring stretch, quad sets     Charges: 1 TE 1 MT 1 TA      Total Timed Treatment: 45 min  Total Treatment Time: 45 min

## 2024-10-30 RX ORDER — ASPIRIN 325 MG
TABLET, DELAYED RELEASE (ENTERIC COATED) ORAL
Qty: 60 TABLET | Refills: 0 | OUTPATIENT
Start: 2024-10-30

## 2024-10-30 NOTE — TELEPHONE ENCOUNTER
Rx request, please review and sign off if appropriate. Thank you.    Last seen: s/p right TKA on 8/8/24  Last refill: 8/9/24 #60 with 0 refills.

## 2024-10-31 ENCOUNTER — OFFICE VISIT (OUTPATIENT)
Dept: PHYSICAL THERAPY | Facility: HOSPITAL | Age: 59
End: 2024-10-31
Payer: MEDICAID

## 2024-10-31 PROCEDURE — 97530 THERAPEUTIC ACTIVITIES: CPT | Performed by: PHYSICAL THERAPIST

## 2024-10-31 PROCEDURE — 97110 THERAPEUTIC EXERCISES: CPT | Performed by: PHYSICAL THERAPIST

## 2024-10-31 PROCEDURE — 97140 MANUAL THERAPY 1/> REGIONS: CPT | Performed by: PHYSICAL THERAPIST

## 2024-10-31 NOTE — PROGRESS NOTES
Discharge Summary  Pt has attended 17 visits in Physical Therapy.    Diagnosis:   Orthopedic aftercare (Z47.89)  S/P TKR (total knee replacement) using cement, right   (Z96.651)  Primary osteoarthritis of right knee (M17.11)      Referring Provider: Shameka Phipps  Date of Evaluation:    9/5/2024    Precautions: diabetes Next MD visit:   None scheduled   Date of Surgery: 8/8/24     Insurance Primary/Secondary: BLUE CROSS MEDICAID / N/A     # Auth Visits: 15v (exp 11/4/24)            Subjective: Patient is Lao speaking so language line solution was used. Patient reports improved pain since starting PT. Due to improvements, her pain has been less while performing all ADLs.    Pain: 0/10 R knee        Objective:   Observation/Skin: Incisional scars looks well healed   Palpation: TTP in the R knee   Sensation: bilat light touch is intact      AROM: (* denotes performed with pain)   Knee    Flexion: R 128*   Extension: R -6*         Patellar Mobility/Accessory motion: Hypomobility with patella mob     Flexibility:   Hamstrings: R Min restrictions; L WFL     Strength/MMT: (* denotes performed with pain)  Hip Knee   Flexion: R 5/5  Abduction: R 4/5  ER: R 4/5 Flexion: R 4/5  Extension: R 5/5         Balance: SLS: R 3 sec, L 5 sec     Gait: pt ambulates on level ground     Assessment: Pt has benefited well in PT with improved pain, ROM, and strength. Due to improvements, she is having less pain while performing ADLs. She is currently able to ambulate without AD. Although she demonstrates improvements, she still has some pain while bending and squatting. She wishes to be discontinue from PT in the time being due to her taking care of her  and feels confident in performing HEP on her own. She was educated on HEP and is being discharge from PT per patient's wishes.     Goals:   Pt will improve knee extension ROM to 0 deg to allow proper heel strike during gait and terminal knee extension in stance  Pt will improve knee  AROM flexion to >128 degrees to improve ability to perform bending  Pt will improve quad strength to 5/5 to ascend 1 flight of stairs reciprocally without UE assist  Pt will increase hip and knee strength to grossly 4+/5 to be able to get up and down from the floor safely  Pt will demonstrate increased hip ER/ABD strength to 4/5 to perform stepping and squatting activities without excessive femoral IR/ADD  Pt will improve SLS to >5s to improve safety and independence with gait on uneven surfaces such as grass  Pt will be independent and compliant with comprehensive HEP to maintain progress achieved in PT    Post LEFS Score  Post LEFS Score: 57.5 % (10/31/2024  2:38 PM)    52.5 % improvement    Plan: Discharge with HEP.       Patient/Family/Caregiver was advised of these findings, precautions, and treatment options and has agreed to actively participate in planning and for this course of care.    Thank you for your referral. If you have any questions, please contact me at Dept: [phone].    Sincerely,  Electronically signed by therapist: Mike Floyd, PT       Date: 10/23/24  Tx#: 15/15 Date: 10/29/24  Tx#: 16/22 Date: 10/31/24  Tx#: 17/22    TE 20'  Shuttle # 3x10  Shuttle SLP 62# 3x10 bilat    Seated knee ext hang 5# R x5'  Standing TKE GTB 3x10 R   SLS + blaze pods taps   TRX mini squats 3x10   Seated HS stretching 3x30s\" R  Slantboard stretch 3x30s\"     TA 15'   Hurdles FWD/Side 6 laps ea   Negotiating stairs 10 laps   STS 3x10 with 1 airex      MT: x10'  Supine patellar glides all direction GIII R  tib-fem glides AP GII-III R  Gentle over pressure into right knee flexion and extension TE 20'  Shuttle # 3x10  Shuttle SLP 62# 3x10 bilat    Seated knee ext hang 5# R x5'  Standing TKE GTB 3x10 R   SLS + blaze pods taps   TRX mini squats 3x10   Seated HS stretching 3x30s\" R  Slantboard stretch 3x30s\"   Standing R knee flexion AAROM on the stairs [id number]   TA 15'   Hurdles FWD/Side 6 laps ea    Negotiating stairs 10 laps   STS 3x10 with 1 airex      MT: x10'  Supine patellar glides all direction GIII R  tib-fem glides AP GII-III R  Gentle over pressure into right knee flexion and extension E 20'  Shuttle # 3x10  Shuttle SLP 62# 3x10 bilat    Seated knee ext hang 5# R x5'  SLR 3x10 R   SLS 3x30s   TRX mini squats 3x10   Seated HS stretching 3x30s\" R  Heel slides R 3x10   Standing R knee flexion AAROM on the stairs [id number]   TA 15'   Hurdles FWD/Side 6 laps ea   Negotiating stairs 10 laps   STS 3x10 with 1 airex      MT: x10'  Supine patellar glides all direction GIII R  tib-fem glides AP GII-III R  Gentle over pressure into right knee flexion and extension       Charges: 1 TE 1 MT, & 1 TA      Total Timed Treatment: 45 min  Total Treatment Time: 45 min

## 2024-11-13 ENCOUNTER — LAB ENCOUNTER (OUTPATIENT)
Dept: LAB | Facility: HOSPITAL | Age: 59
End: 2024-11-13
Payer: MEDICAID

## 2024-11-13 DIAGNOSIS — E78.5 HYPERLIPEMIA: ICD-10-CM

## 2024-11-13 DIAGNOSIS — E11.9 DIABETES MELLITUS (HCC): Primary | ICD-10-CM

## 2024-11-13 DIAGNOSIS — R53.81 DEBILITY, UNSPECIFIED: ICD-10-CM

## 2024-11-13 LAB
ALBUMIN SERPL-MCNC: 4.8 G/DL (ref 3.2–4.8)
ALBUMIN/GLOB SERPL: 1.6 {RATIO} (ref 1–2)
ALP LIVER SERPL-CCNC: 104 U/L
ALT SERPL-CCNC: 27 U/L
ANION GAP SERPL CALC-SCNC: 7 MMOL/L (ref 0–18)
AST SERPL-CCNC: 32 U/L (ref ?–34)
BILIRUB SERPL-MCNC: 0.6 MG/DL (ref 0.3–1.2)
BUN BLD-MCNC: 9 MG/DL (ref 9–23)
BUN/CREAT SERPL: 18 (ref 10–20)
CALCIUM BLD-MCNC: 9.9 MG/DL (ref 8.7–10.4)
CHLORIDE SERPL-SCNC: 109 MMOL/L (ref 98–112)
CHOLEST SERPL-MCNC: 137 MG/DL (ref ?–200)
CO2 SERPL-SCNC: 27 MMOL/L (ref 21–32)
CREAT BLD-MCNC: 0.5 MG/DL
EGFRCR SERPLBLD CKD-EPI 2021: 108 ML/MIN/1.73M2 (ref 60–?)
EST. AVERAGE GLUCOSE BLD GHB EST-MCNC: 134 MG/DL (ref 68–126)
FASTING PATIENT LIPID ANSWER: YES
FASTING STATUS PATIENT QL REPORTED: YES
GLOBULIN PLAS-MCNC: 3 G/DL (ref 2–3.5)
GLUCOSE BLD-MCNC: 92 MG/DL (ref 70–99)
HBA1C MFR BLD: 6.3 % (ref ?–5.7)
HDLC SERPL-MCNC: 48 MG/DL (ref 40–59)
LDLC SERPL CALC-MCNC: 68 MG/DL (ref ?–100)
NONHDLC SERPL-MCNC: 89 MG/DL (ref ?–130)
OSMOLALITY SERPL CALC.SUM OF ELEC: 294 MOSM/KG (ref 275–295)
POTASSIUM SERPL-SCNC: 4.1 MMOL/L (ref 3.5–5.1)
PROT SERPL-MCNC: 7.8 G/DL (ref 5.7–8.2)
SODIUM SERPL-SCNC: 143 MMOL/L (ref 136–145)
TRIGL SERPL-MCNC: 119 MG/DL (ref 30–149)
VLDLC SERPL CALC-MCNC: 18 MG/DL (ref 0–30)

## 2024-11-13 PROCEDURE — 83036 HEMOGLOBIN GLYCOSYLATED A1C: CPT

## 2024-11-13 PROCEDURE — 36415 COLL VENOUS BLD VENIPUNCTURE: CPT

## 2024-11-13 PROCEDURE — 80053 COMPREHEN METABOLIC PANEL: CPT

## 2024-11-13 PROCEDURE — 80061 LIPID PANEL: CPT

## 2025-06-16 ENCOUNTER — APPOINTMENT (OUTPATIENT)
Dept: CT IMAGING | Facility: HOSPITAL | Age: 60
End: 2025-06-16
Attending: NURSE PRACTITIONER
Payer: MEDICAID

## 2025-06-16 ENCOUNTER — HOSPITAL ENCOUNTER (EMERGENCY)
Facility: HOSPITAL | Age: 60
Discharge: HOME | End: 2025-06-16
Payer: MEDICAID

## 2025-06-16 VITALS
DIASTOLIC BLOOD PRESSURE: 76 MMHG | WEIGHT: 175 LBS | HEIGHT: 60 IN | SYSTOLIC BLOOD PRESSURE: 162 MMHG | OXYGEN SATURATION: 97 % | RESPIRATION RATE: 16 BRPM | BODY MASS INDEX: 34.36 KG/M2 | HEART RATE: 72 BPM | TEMPERATURE: 98 F

## 2025-06-16 DIAGNOSIS — N20.1 CALCULUS OF URETEROVESICAL JUNCTION (UVJ): Primary | ICD-10-CM

## 2025-06-16 LAB
ALBUMIN SERPL-MCNC: 5.1 G/DL (ref 3.2–4.8)
ALBUMIN/GLOB SERPL: 1.8 {RATIO} (ref 1–2)
ALP LIVER SERPL-CCNC: 106 U/L (ref 46–118)
ALT SERPL-CCNC: 50 U/L (ref 10–49)
ANION GAP SERPL CALC-SCNC: 7 MMOL/L (ref 0–18)
AST SERPL-CCNC: 42 U/L (ref ?–34)
BASOPHILS # BLD AUTO: 0.04 X10(3) UL (ref 0–0.2)
BASOPHILS NFR BLD AUTO: 0.3 %
BILIRUB SERPL-MCNC: 1 MG/DL (ref 0.2–1.1)
BILIRUB UR QL: NEGATIVE
BUN BLD-MCNC: 10 MG/DL (ref 9–23)
BUN/CREAT SERPL: 10 (ref 10–20)
CALCIUM BLD-MCNC: 9.8 MG/DL (ref 8.7–10.4)
CHLORIDE SERPL-SCNC: 101 MMOL/L (ref 98–112)
CLARITY UR: CLEAR
CO2 SERPL-SCNC: 28 MMOL/L (ref 21–32)
COLOR UR: YELLOW
CREAT BLD-MCNC: 1 MG/DL (ref 0.55–1.02)
DEPRECATED RDW RBC AUTO: 38.5 FL (ref 35.1–46.3)
EGFRCR SERPLBLD CKD-EPI 2021: 64 ML/MIN/1.73M2 (ref 60–?)
EOSINOPHIL # BLD AUTO: 0.06 X10(3) UL (ref 0–0.7)
EOSINOPHIL NFR BLD AUTO: 0.4 %
ERYTHROCYTE [DISTWIDTH] IN BLOOD BY AUTOMATED COUNT: 12.1 % (ref 11–15)
GLOBULIN PLAS-MCNC: 2.9 G/DL (ref 2–3.5)
GLUCOSE BLD-MCNC: 130 MG/DL (ref 70–99)
GLUCOSE UR-MCNC: NORMAL MG/DL
HCT VFR BLD AUTO: 42.7 % (ref 35–48)
HGB BLD-MCNC: 14.9 G/DL (ref 12–16)
HGB UR QL STRIP.AUTO: NEGATIVE
IMM GRANULOCYTES # BLD AUTO: 0.05 X10(3) UL (ref 0–1)
IMM GRANULOCYTES NFR BLD: 0.4 %
KETONES UR-MCNC: NEGATIVE MG/DL
LEUKOCYTE ESTERASE UR QL STRIP.AUTO: NEGATIVE
LIPASE SERPL-CCNC: 30 U/L (ref 12–53)
LYMPHOCYTES # BLD AUTO: 2.33 X10(3) UL (ref 1–4)
LYMPHOCYTES NFR BLD AUTO: 16.8 %
MCH RBC QN AUTO: 30.5 PG (ref 26–34)
MCHC RBC AUTO-ENTMCNC: 34.9 G/DL (ref 31–37)
MCV RBC AUTO: 87.3 FL (ref 80–100)
MONOCYTES # BLD AUTO: 1.3 X10(3) UL (ref 0.1–1)
MONOCYTES NFR BLD AUTO: 9.4 %
NEUTROPHILS # BLD AUTO: 10.12 X10 (3) UL (ref 1.5–7.7)
NEUTROPHILS # BLD AUTO: 10.12 X10(3) UL (ref 1.5–7.7)
NEUTROPHILS NFR BLD AUTO: 72.7 %
NITRITE UR QL STRIP.AUTO: NEGATIVE
OSMOLALITY SERPL CALC.SUM OF ELEC: 283 MOSM/KG (ref 275–295)
PH UR: 6.5 [PH] (ref 5–8)
PLATELET # BLD AUTO: 423 10(3)UL (ref 150–450)
POTASSIUM SERPL-SCNC: 4.7 MMOL/L (ref 3.5–5.1)
PROT SERPL-MCNC: 8 G/DL (ref 5.7–8.2)
PROT UR-MCNC: NEGATIVE MG/DL
RBC # BLD AUTO: 4.89 X10(6)UL (ref 3.8–5.3)
SODIUM SERPL-SCNC: 136 MMOL/L (ref 136–145)
SP GR UR STRIP: <1.005 (ref 1–1.03)
UROBILINOGEN UR STRIP-ACNC: NORMAL
WBC # BLD AUTO: 13.9 X10(3) UL (ref 4–11)

## 2025-06-16 PROCEDURE — 80053 COMPREHEN METABOLIC PANEL: CPT

## 2025-06-16 PROCEDURE — 81003 URINALYSIS AUTO W/O SCOPE: CPT | Performed by: NURSE PRACTITIONER

## 2025-06-16 PROCEDURE — 96375 TX/PRO/DX INJ NEW DRUG ADDON: CPT

## 2025-06-16 PROCEDURE — 96361 HYDRATE IV INFUSION ADD-ON: CPT

## 2025-06-16 PROCEDURE — 99284 EMERGENCY DEPT VISIT MOD MDM: CPT

## 2025-06-16 PROCEDURE — 83690 ASSAY OF LIPASE: CPT

## 2025-06-16 PROCEDURE — 85025 COMPLETE CBC W/AUTO DIFF WBC: CPT

## 2025-06-16 PROCEDURE — 96374 THER/PROPH/DIAG INJ IV PUSH: CPT

## 2025-06-16 PROCEDURE — 74177 CT ABD & PELVIS W/CONTRAST: CPT | Performed by: NURSE PRACTITIONER

## 2025-06-16 RX ORDER — HYDROCODONE BITARTRATE AND ACETAMINOPHEN 7.5; 325 MG/1; MG/1
1-2 TABLET ORAL EVERY 6 HOURS PRN
Qty: 10 TABLET | Refills: 0 | Status: SHIPPED | OUTPATIENT
Start: 2025-06-16 | End: 2025-06-21

## 2025-06-16 RX ORDER — MORPHINE SULFATE 4 MG/ML
4 INJECTION, SOLUTION INTRAMUSCULAR; INTRAVENOUS ONCE
Status: COMPLETED | OUTPATIENT
Start: 2025-06-16 | End: 2025-06-16

## 2025-06-16 RX ORDER — TAMSULOSIN HYDROCHLORIDE 0.4 MG/1
0.4 CAPSULE ORAL DAILY
Qty: 7 CAPSULE | Refills: 0 | Status: SHIPPED | OUTPATIENT
Start: 2025-06-16 | End: 2025-06-23

## 2025-06-16 RX ORDER — ONDANSETRON 2 MG/ML
4 INJECTION INTRAMUSCULAR; INTRAVENOUS ONCE
Status: COMPLETED | OUTPATIENT
Start: 2025-06-16 | End: 2025-06-16

## 2025-06-16 NOTE — ED PROVIDER NOTES
Patient Seen in: [facility] Emergency Department        History  Chief Complaint   Patient presents with    Abdomen/Flank Pain     Stated Complaint: pelvic pain    Subjective:   61yo/f w hx of DM, HLD, Migraines reports to the ED w c/o bilateral lower abdominal pain. Similar episode 1 week ago that resolved w/o intervention. Restarted last night, now constant, burning. No urinary symptoms. No chest pain. No cough or congestion No weakness. No back pain. No diarrhea. No new meds.                       Objective:     Past Medical History:    Diabetes (HCC)    Diabetes mellitus (HCC)    Esophageal reflux    Hyperlipidemia    Migraines    Osteoarthritis    RIGHT ARM PAIN, left knee    Visual impairment    readers              Past Surgical History:   Procedure Laterality Date    Cholecystectomy      Cystoscopy,insert ureteral stent      bladder sling 7 years ago    Hysterectomy      Total knee replacement      Tubal ligation                  Social History     Socioeconomic History    Marital status:    Tobacco Use    Smoking status: Never     Passive exposure: Never    Smokeless tobacco: Never   Vaping Use    Vaping status: Never Used   Substance and Sexual Activity    Alcohol use: Not Currently    Drug use: Never    Sexual activity: Yes     Social Drivers of Health     Food Insecurity: No Food Insecurity (8/8/2024)    Food Insecurity     Food Insecurity: Never true   Transportation Needs: No Transportation Needs (8/8/2024)    Transportation Needs     Lack of Transportation: No   Housing Stability: Low Risk  (8/8/2024)    Housing Stability     Housing Instability: No                                Physical Exam    ED Triage Vitals [06/16/25 1317]   /83   Pulse 80   Resp 19   Temp 98.1 °F (36.7 °C)   Temp src Oral   SpO2 96 %   O2 Device None (Room air)       Current Vitals:   Vital Signs  BP: (!) 162/76  Pulse: 72  Resp: 16  Temp: 98.1 °F (36.7 °C)  Temp src: Oral    Oxygen Therapy  SpO2: 97 %  O2  Device: None (Room air)            Physical Exam  Vitals and nursing note reviewed.   Constitutional:       General: She is not in acute distress.     Appearance: She is well-developed.   HENT:      Head: Normocephalic and atraumatic.      Nose: Nose normal.      Mouth/Throat:      Mouth: Mucous membranes are moist.   Eyes:      Conjunctiva/sclera: Conjunctivae normal.      Pupils: Pupils are equal, round, and reactive to light.   Cardiovascular:      Rate and Rhythm: Normal rate and regular rhythm.      Heart sounds: Normal heart sounds.   Pulmonary:      Effort: Pulmonary effort is normal.      Breath sounds: Normal breath sounds.   Abdominal:      General: Bowel sounds are normal.      Palpations: Abdomen is soft.   Musculoskeletal:         General: No tenderness or deformity. Normal range of motion.      Cervical back: Normal range of motion and neck supple.   Skin:     General: Skin is warm and dry.      Capillary Refill: Capillary refill takes less than 2 seconds.      Findings: No rash.      Comments: Normal color   Neurological:      General: No focal deficit present.      Mental Status: She is alert and oriented to person, place, and time.      GCS: GCS eye subscore is 4. GCS verbal subscore is 5. GCS motor subscore is 6.      Cranial Nerves: No cranial nerve deficit.      Gait: Gait normal.                 ED Course  Labs Reviewed   COMP METABOLIC PANEL (14) - Abnormal; Notable for the following components:       Result Value    Glucose 130 (*)     ALT 50 (*)     AST 42 (*)     Albumin 5.1 (*)     All other components within normal limits   CBC WITH DIFFERENTIAL WITH PLATELET - Abnormal; Notable for the following components:    WBC 13.9 (*)     Neutrophil Absolute Prelim 10.12 (*)     Neutrophil Absolute 10.12 (*)     Monocyte Absolute 1.30 (*)     All other components within normal limits   URINALYSIS WITH CULTURE REFLEX - Abnormal; Notable for the following components:    Spec Gravity <1.005 (*)     All  other components within normal limits   LIPASE - Normal   RAINBOW DRAW LAVENDER   RAINBOW DRAW LIGHT GREEN   RAINBOW DRAW BLUE          Impression  CONCLUSION:     5 mm bladder calculus within the bladder at the level of the right ureterovesicular junction with moderate right hydroureteronephrosis.     Significant adjacent perinephric stranding raises the possibility of superimposed infection.       Nonspecific hyperdensity within the bladder at the level of the right ureterovesicular junction may be secondary to blood byproducts.  Nonemergent cystoscopy suggested after resolution of patient's symptoms to exclude underlying bladder mass.     Diverticulosis without diverticulitis.     Hepatic steatosis     Multiple other incidental findings as described in the body of the report.             Dictated by (CST): Tucker Pruett MD on 6/16/2025 at 4:34 PM      Finalized by (CST): Tucker Pruett MD on 6/16/2025 at 4:37 PM                        [facility]             Medical Decision Making  59yo/f w hx and exam as stated; co abd pain    CT w bladder stone  No hematuria  No fever  No vomiting  No dysuria  No evidence of uti    Plan  Flomax  Pain control  Close fu with urology        Amount and/or Complexity of Data Reviewed  Labs:  Decision-making details documented in ED Course.  Radiology:  Decision-making details documented in ED Course.    Risk  OTC drugs.  Prescription drug management.        Disposition and Plan     Clinical Impression:  1. Calculus of ureterovesical junction (UVJ)         Disposition:  Discharge  6/16/2025  4:59 pm    Follow-up:  Marquise Zuñiga MD  [facility] [address] [address]  [phone]    Follow up in 2 day(s)            Medications Prescribed:  Current Discharge Medication List        START taking these medications    Details   tamsulosin (FLOMAX) 0.4 MG Oral Cap Take 1 capsule (0.4 mg total) by mouth daily for 7 days.  Qty: 7 capsule, Refills: 0    Associated Diagnoses: Calculus of  ureterovesical junction (UVJ)      HYDROcodone-acetaminophen 7.5-325 MG Oral Tab Take 1-2 tablets by mouth every 6 (six) hours as needed for Pain.  Qty: 10 tablet, Refills: 0    Associated Diagnoses: Calculus of ureterovesical junction (UVJ)                   Supplementary Documentation:

## 2025-06-16 NOTE — ED INITIAL ASSESSMENT (HPI)
Pt presents for periumbilical abd pain and right sided abd pain that radiates to back since yesterday. Pt reports gallbladder removed six years ago. Pt denies recent cough or fever. Pt denies urinary complaints.

## (undated) DEVICE — 35 ML SYRINGE LUER-LOCK TIP: Brand: MONOJECT

## (undated) DEVICE — Device: Brand: STABLECUT®

## (undated) DEVICE — CEMENT MIXING SYSTEM WITH FEMORAL BREAKWAY NOZZLE: Brand: REVOLUTION

## (undated) DEVICE — SCREW BNE L25MM DIA2.5MM KNEE FT HEX FEM

## (undated) DEVICE — HOOD: Brand: FLYTE

## (undated) DEVICE — SOLUTION IRRIG 3000ML 0.9% NACL FLX CONT

## (undated) DEVICE — APPLICATOR SKIN PREP 26ML HI LT ORNG 2% CHG

## (undated) DEVICE — WRAP COOLING KNEE W/ICE PILLOW

## (undated) DEVICE — SLIM BODY SKIN STAPLER: Brand: APPOSE ULC

## (undated) DEVICE — GAMMEX® NON-LATEX PI ORTHO SIZE 7, STERILE POLYISOPRENE POWDER-FREE SURGICAL GLOVE: Brand: GAMMEX

## (undated) DEVICE — SPONGE GZ 4XL4IN 100% COT 12 PLY TYP VII WVN

## (undated) DEVICE — GAMMEX® NON-LATEX PI ORTHO SIZE 8, STERILE POLYISOPRENE POWDER-FREE SURGICAL GLOVE: Brand: GAMMEX

## (undated) DEVICE — ADHESIVE SKIN TOP FOR WND CLSR DERMBND ADV

## (undated) DEVICE — BANDAGE COMPR W6INXL12FT SMTH FOR LIMB EXSANG

## (undated) DEVICE — GAMMEX® PI HYBRID SIZE 6.5, STERILE POWDER-FREE SURGICAL GLOVE, POLYISOPRENE AND NEOPRENE BLEND: Brand: GAMMEX

## (undated) DEVICE — SPONGE 4X4 10PK

## (undated) DEVICE — NEEDLE SPINL CLR HUB 18GX3 1/2

## (undated) DEVICE — VIOLET BRAIDED (POLYGLACTIN 910), SYNTHETIC ABSORBABLE SUTURE: Brand: COATED VICRYL

## (undated) DEVICE — PACK CDS TOTAL KNEE

## (undated) DEVICE — SOLUTION IRRIG 1000ML 0.9% NACL USP BTL

## (undated) DEVICE — SHEET,DRAPE,53X77,STERILE: Brand: MEDLINE

## (undated) DEVICE — SCREW ORTH 2.5X25MM FEM HEX PERSONA

## (undated) DEVICE — GAMMEX® PI HYBRID SIZE 7.5, STERILE POWDER-FREE SURGICAL GLOVE, POLYISOPRENE AND NEOPRENE BLEND: Brand: GAMMEX

## (undated) DEVICE — PIN DRL L75MM DIA3.2MM HEX 2.5MM TRCR TIP

## (undated) DEVICE — SUTURE VCRL SZ 2-0 L27IN ABSRB UD L24MM FS-1

## (undated) DEVICE — SUT VCRL 1-0 36IN CTX ABSRB UD L48MM 1/2 CIR

## (undated) DEVICE — TOTAL KNEE: Brand: MEDLINE INDUSTRIES, INC.

## (undated) DEVICE — DRIVER SURG 2MM HEX FEM SET

## (undated) DEVICE — 3M™ COBAN™ NL STERILE NON-LATEX SELF-ADHERENT WRAP, 2084S, 4 IN X 5 YD (10 CM X 4,5 M), 18 ROLLS/CASE: Brand: 3M™ COBAN™

## (undated) DEVICE — SCREW BNE L48MM DIA3.5MM STD HD 5MM CORT ST

## (undated) DEVICE — ANTIBACTERIAL UNDYED BRAIDED (POLYGLACTIN 910), SYNTHETIC ABSORBABLE SUTURE: Brand: COATED VICRYL

## (undated) DEVICE — GUIDEPIN SUR L29MM FEM TIB PREFERRED PT SPEC

## (undated) DEVICE — DRAPE SHEET LG

## (undated) DEVICE — SCREW FIX 3.5X48MM HEX HD

## (undated) DEVICE — APPLICATOR PREP 26ML CHG 2% ISO ALC 70%

## (undated) DEVICE — PIN DRL 75MM HDLSS TRCR NXGN

## (undated) DEVICE — DISPOSABLE TOURNIQUET CUFF SINGLE BLADDER, DUAL PORT AND QUICK CONNECT CONNECTOR: Brand: COLOR CUFF

## (undated) DEVICE — SUTURE MCRYL SZ 3-0 L27IN ABSRB UD L24MM PS-1

## (undated) DEVICE — NEEDLE SPNL 18GA L3.5IN W/ QNCKE SHARPER BVL

## (undated) DEVICE — DRESSING SILVERLON ISLAND 11IN

## (undated) DEVICE — BANDAGE COHESIVE W4INXL5YD TAN E POR SLF ADH

## (undated) NOTE — LETTER
Hospital Discharge Documentation  Please phone to schedule a hospital follow up appointment.    From: [facility] Hospitalist's Office  Phone: [phone]    Patient discharged time/date: 8/10/2024  2:27 PM  Patient discharge disposition: [facility]       Discharge Summary - D/C Summary        Discharge Summary signed by Ced Alvarez MD at 8/10/2024 10:25 AM  Version 2 of 2      Author: Ced Alvarez MD Service: Hospitalist Author Type: Physician    Filed: 8/10/2024 10:25 AM Date of Service: 8/10/2024 10:23 AM Status: Addendum    : Ced Alvarez MD (Physician)    Related Notes: Original Note by Ced Alvarez MD (Physician) filed at 8/10/2024 10:24 AM           [facility]  part of [facility]    DISCHARGE SUMMARY     Matilde Ferrera Patient Status:  Outpatient in a Bed    3/30/1965 MRN [id number]   Location [facility] 4//SE Attending Ced Alvarez MD   Hosp Day # 0 PCP Dolores Almanza MD     Date of Admission: 2024  Date of Discharge:  8/10/2024    Discharge Disposition: Home Health Care Services    Discharge Diagnosis:     OA s/p R TKA  HLD  T2DM    History of Present Illness:     The patient is a 59-year-old  female with chronic right knee pain and underlying severe primary osteoarthritis, failed outpatient conservative medical management option. Scheduled today for above-mentioned procedure by her orthopedic surgeon, Dr. Dustin Mclean. Preoperatively, she had spinal block. Postoperatively, transferred to PACU for further monitoring.     Brief Synopsis:     OA s/p R TKA  Tolerated surgery well  IVF, Abx, DVT ppx per ortho  PT/OT  Patient cleared for discharge by ortho. Patient is to follow up with PCP and Ortho as opt for further care  HLD  Continue statin  T2DM  SSI and frequent accuchecks    Discharge medications including DVT ppx and pain meds ordered by ortho.   Patient is to remain compliant with all discharge medications and  instructions and to follow up as advised.   Patient encouraged to make healthy lifestyle and dietary changes.    Lace+ Score: 27  59-90 High Risk  29-58 Medium Risk  0-28   Low Risk       TCM Follow-Up Recommendation:  LACE 29-58: Moderate Risk of readmission after discharge from the hospital.      Procedures during hospitalization:   R TKA    Incidental or significant findings and recommendations (brief descriptions):  None    Lab/Test results pending at Discharge:   None    Consultants:  Ortho    Discharge Medication List:     Discharge Medications        START taking these medications        Instructions Prescription details   calcium carbonate-vitamin D 250-3.125 MG-MCG Tabs  Commonly known as: Oyster Shell-D      Take 1 tablet by mouth 2 (two) times daily with meals.   Quantity: 60 tablet  Refills: 0     docusate sodium 100 MG Caps  Commonly known as: COLACE      Take 100 mg by mouth 2 (two) times daily. Do not crush. Stop if loose stool   Quantity: 20 capsule  Refills: 0     HYDROcodone-acetaminophen  MG Tabs  Commonly known as: Norco      Take 1 tablet by mouth every 4 (four) hours as needed. No alcohol or driving on this medication.  Stop if lethargic or hallucinating.  Maximum 4000 mg Tylenol/acetaminophen daily from all sources.  Recall each Norco has 325 mg of Tylenol/acetaminophen in it.   Quantity: 25 tablet  Refills: 0     multivitamin with minerals Tabs      Take 1 tablet by mouth daily.   Quantity: 30 tablet  Refills: 0     naproxen 250 MG Tabs  Commonly known as: Naprosyn      Take 1 tablet (250 mg total) by mouth 2 (two) times daily with meals. Take with food.  Stop if stomach upset.   Quantity: 28 tablet  Refills: 0            CHANGE how you take these medications        Instructions Prescription details   acetaminophen 325 MG Tabs  Commonly known as: Tylenol  What changed:   when to take this  additional instructions      Take 2 tablets (650 mg total) by mouth every 6 (six) hours as needed  for Pain. Maximum 4000 mg Tylenol/acetaminophen daily from all sources.  Each Norco has 325 mg of Tylenol/acetaminophen in it.   Quantity: 60 tablet  Refills: 0     aspirin 325 MG Tbec  What changed: additional instructions      Take 1 tablet (325 mg total) by mouth in the morning and 1 tablet (325 mg total) before bedtime. Do not crush.   Quantity: 60 tablet  Refills: 0            CONTINUE taking these medications        Instructions Prescription details   estradiol 0.1 MG/GM Crea  Commonly known as: Estrace      Apply 0.5 gram vaginally 2  times per week.   Quantity: 42.5 g  Refills: 3     gabapentin 300 MG Caps  Commonly known as: Neurontin      Take 1 capsule (300 mg total) by mouth in the morning, at noon, and at bedtime.   Quantity: 270 capsule  Refills: 1     metFORMIN HCl 1000 MG Tabs  Commonly known as: GLUCOPHAGE      Take 1 tablet (1,000 mg total) by mouth daily with breakfast AND 0.5 tablets (500 mg total) daily with dinner.   Refills: 0     OneTouch Ultra Strp  Generic drug: Glucose Blood      1 each by Other route in the morning and 1 each before bedtime.   Quantity: 200 strip  Refills: 1     rosuvastatin 10 MG Tabs  Commonly known as: Crestor      TAKE 1 TABLET(10 MG) BY MOUTH EVERY NIGHT   Quantity: 90 tablet  Refills: 0            STOP taking these medications      Trulicity 4.5 MG/0.5ML Sopn  Generic drug: Dulaglutide                  Where to Get Your Medications        These medications were sent to [id number] [place], [place] - [address] [facility] AT [facility], [phone], [phone] [address] [place] [address]      Phone: [phone]   acetaminophen 325 MG Tabs  aspirin 325 MG Tbec  calcium carbonate-vitamin D 250-3.125 MG-MCG Tabs  docusate sodium 100 MG Caps  HYDROcodone-acetaminophen  MG Tabs  multivitamin with minerals Tabs  naproxen 250 MG Tabs         [place]    Follow-up appointment:   Dustin Mclean MD  [address]    [address]  [facility] [address]  [phone]    Schedule an appointment as soon as possible for a visit in 12 day(s)      Dolores Almanza MD  [address]  [place] [id number] [phone]    Follow up in 1 week(s)      Appointments for Next 30 Days 8/10/2024 - 9/9/2024        Date Arrival Time Visit Type Length Department Provider     8/20/2024  8:00 AM  POST OP VISIT [0253] 30 min [facility] Shameka Phipps PA    Patient Instructions:         Location Instructions:     Your appointment is located at [address] [facility] in [place], [place].&nbsp; Please park in the Yellow lot and enter through the [facility].&nbsp; Then proceed to [address].  Masks are optional for all patients and visitors, unless otherwise indicated.               8/28/2024 12:30 PM  [facility] VIRTUAL PHONE VISIT [[id number]] 15 min [facility] Urogynecology Caroline Chung DO    Patient Instructions:     You have been scheduled for a Virtual Telephone visit with your provider. Please be available at your phone so that your physician can contact you, and be prepared with any questions or concerns. As always, your health is our priority.     We suggest confirming with your insurance regarding coverage prior to your appointment as some payors may no longer cover telephone visits. Depending on your insurance you may also be billed a copay at a later time.        Location Instructions:     The [facility] is located at [address], [place].  You may park in the Yellow Parking area located next to the [facility].&nbsp; Also, free  service is available.&nbsp; Our office is then located on the fourth floor, [address].  Masks are optional for all patients and visitors, unless otherwise indicated.                      Vital signs:  Temp:  [97.6 °F (36.4 °C)-98.2 °F (36.8 °C)] 97.6 °F (36.4 °C)  Pulse:   [66-79] 79  Resp:  [16-18] 18  BP: ()/(60-76) 120/76  SpO2:  [95 %-97 %] 97 %    Physical Exam:    Gen: NAD AO x3  Chest: good air entry CTABL  CVS: normal s1 and s2 RR  Abd: NABS soft NT ND  Neuro: CN 2-12 grossly intact  Ext: no edema in bilateral LE    -----------------------------------------------------------------------------------------------  PATIENT DISCHARGE INSTRUCTIONS: See electronic chart    Ced Alvarez MD  Hospitalist    Time spent:  > 30 minutes    The  Cures Act makes medical notes like these available to patients in the interest of transparency. Please be advised this is a medical document. Medical documents are intended to carry relevant information, facts as evident, and the clinical opinion of the practitioner. The medical note is intended as peer to peer communication and may appear blunt or direct. It is written in medical language and may contain abbreviations or verbiage that are unfamiliar.     Electronically signed by Ced Alavrez MD on 8/10/2024 10:25 AM       Discharge Summary signed by Ced Alvarez MD at 8/10/2024 10:24 AM  Version 1 of 2      Author: Ced Alvarez MD Service: Hospitalist Author Type: Physician    Filed: 8/10/2024 10:24 AM Date of Service: 8/10/2024 10:23 AM Status: Signed    : Ced Alvarez MD (Physician)    Related Notes: Addendum by Ced Alvarez MD (Physician) filed at 8/10/2024 10:25 AM           [facility]  part of [place] [facility]    DISCHARGE SUMMARY     Matilde Ferrera Patient Status:  Outpatient in a Bed    3/30/1965 MRN [id number]   Location [facility] 4W/SW/SE Attending Ced Alvarez MD   Hosp Day # 0 PCP Dolores Almanza MD     Date of Admission: 2024  Date of Discharge:  8/10/2024    Discharge Disposition: Home Health Care Services    Discharge Diagnosis:     OA s/p R TKA  HLD  T2DM    History of Present Illness:     The patient is a 59-year-old  female with chronic right knee pain and underlying  severe primary osteoarthritis, failed outpatient conservative medical management option. Scheduled today for above-mentioned procedure by her orthopedic surgeon, Dr. Dustin Mclean. Preoperatively, she had spinal block. Postoperatively, transferred to PACU for further monitoring.     Brief Synopsis:     OA s/p R TKA  Tolerated surgery well  IVF, Abx, DVT ppx per ortho  PT/OT  Patient cleared for discharge by ortho. Patient is to follow up with PCP and Ortho as opt for further care  HLD  Continue statin  T2DM  SSI and frequent accuchecks    Patient is to remain compliant with all discharge medications and instructions and to follow up as advised.   Patient encouraged to make healthy lifestyle and dietary changes.    Lace+ Score: 27  59-90 High Risk  29-58 Medium Risk  0-28   Low Risk       TCM Follow-Up Recommendation:  LACE 29-58: Moderate Risk of readmission after discharge from the hospital.      Procedures during hospitalization:   R TKA    Incidental or significant findings and recommendations (brief descriptions):  None    Lab/Test results pending at Discharge:   None    Consultants:  Ortho    Discharge Medication List:     Discharge Medications        START taking these medications        Instructions Prescription details   calcium carbonate-vitamin D 250-3.125 MG-MCG Tabs  Commonly known as: Oyster Shell-D      Take 1 tablet by mouth 2 (two) times daily with meals.   Quantity: 60 tablet  Refills: 0     docusate sodium 100 MG Caps  Commonly known as: COLACE      Take 100 mg by mouth 2 (two) times daily. Do not crush. Stop if loose stool   Quantity: 20 capsule  Refills: 0     HYDROcodone-acetaminophen  MG Tabs  Commonly known as: Norco      Take 1 tablet by mouth every 4 (four) hours as needed. No alcohol or driving on this medication.  Stop if lethargic or hallucinating.  Maximum 4000 mg Tylenol/acetaminophen daily from all sources.  Recall each Norco has 325 mg of Tylenol/acetaminophen in it.   Quantity:  25 tablet  Refills: 0     multivitamin with minerals Tabs      Take 1 tablet by mouth daily.   Quantity: 30 tablet  Refills: 0     naproxen 250 MG Tabs  Commonly known as: Naprosyn      Take 1 tablet (250 mg total) by mouth 2 (two) times daily with meals. Take with food.  Stop if stomach upset.   Quantity: 28 tablet  Refills: 0            CHANGE how you take these medications        Instructions Prescription details   acetaminophen 325 MG Tabs  Commonly known as: Tylenol  What changed:   when to take this  additional instructions      Take 2 tablets (650 mg total) by mouth every 6 (six) hours as needed for Pain. Maximum 4000 mg Tylenol/acetaminophen daily from all sources.  Each Norco has 325 mg of Tylenol/acetaminophen in it.   Quantity: 60 tablet  Refills: 0     aspirin 325 MG Tbec  What changed: additional instructions      Take 1 tablet (325 mg total) by mouth in the morning and 1 tablet (325 mg total) before bedtime. Do not crush.   Quantity: 60 tablet  Refills: 0            CONTINUE taking these medications        Instructions Prescription details   estradiol 0.1 MG/GM Crea  Commonly known as: Estrace      Apply 0.5 gram vaginally 2  times per week.   Quantity: 42.5 g  Refills: 3     gabapentin 300 MG Caps  Commonly known as: Neurontin      Take 1 capsule (300 mg total) by mouth in the morning, at noon, and at bedtime.   Quantity: 270 capsule  Refills: 1     metFORMIN HCl 1000 MG Tabs  Commonly known as: GLUCOPHAGE      Take 1 tablet (1,000 mg total) by mouth daily with breakfast AND 0.5 tablets (500 mg total) daily with dinner.   Refills: 0     OneTouch Ultra Strp  Generic drug: Glucose Blood      1 each by Other route in the morning and 1 each before bedtime.   Quantity: 200 strip  Refills: 1     rosuvastatin 10 MG Tabs  Commonly known as: Crestor      TAKE 1 TABLET(10 MG) BY MOUTH EVERY NIGHT   Quantity: 90 tablet  Refills: 0            STOP taking these medications      Trulicity 4.5 MG/0.5ML Sopn  Generic  drug: Dulaglutide                  Where to Get Your Medications        These medications were sent to [facility] #[id number] - [place], [place] - [address] AT [facility], [phone], [phone] [address], [facility] [address]      Phone: [phone]   acetaminophen 325 MG Tabs  aspirin 325 MG Tbec  calcium carbonate-vitamin D 250-3.125 MG-MCG Tabs  docusate sodium 100 MG Caps  HYDROcodone-acetaminophen  MG Tabs  multivitamin with minerals Tabs  naproxen 250 MG Tabs         [place]    Follow-up appointment:   Dustin Mclean MD  [address]  [address]  [facility] [address] [phone]    Schedule an appointment as soon as possible for a visit in 12 day(s)      Dolores Almanza MD  [address]  [place] [facility] [address] [phone]    Follow up in 1 week(s)      Appointments for Next 30 Days 8/10/2024 - 9/9/2024        Date Arrival Time Visit Type Length Department Provider     8/20/2024  8:00 AM  POST OP VISIT [[id number]] 30 min [facility] [place] Shameka Phipps PA    Patient Instructions:         Location Instructions:     Your appointment is located at [address] [facility] in [place], [place].&nbsp; Please park in the Yellow lot and enter through the [facility] entrance.&nbsp; Then proceed to [address].  Masks are optional for all patients and visitors, unless otherwise indicated.               8/28/2024 12:30 PM  [facility] VIRTUAL PHONE VISIT [[id number]] 15 min [facility] Urogynecology Caroline Chung DO    Patient Instructions:     You have been scheduled for a Virtual Telephone visit with your provider. Please be available at your phone so that your physician can contact you, and be prepared with any questions or concerns. As always, your health is our priority.     We suggest confirming with your insurance regarding coverage prior to your appointment as some payors may no longer  cover telephone visits. Depending on your insurance you may also be billed a copay at a later time.        Location Instructions:     The [facility] is located at [address], [place], [place].  You may park in the Yellow Parking area located next to the [facility].&nbsp; Also, free  service is available.&nbsp; Our office is then located on the fourth floor, [address].  Masks are optional for all patients and visitors, unless otherwise indicated.                      Vital signs:  Temp:  [97.6 °F (36.4 °C)-98.2 °F (36.8 °C)] 97.6 °F (36.4 °C)  Pulse:  [66-79] 79  Resp:  [16-18] 18  BP: ()/(60-76) 120/76  SpO2:  [95 %-97 %] 97 %    Physical Exam:    Gen: NAD AO x3  Chest: good air entry CTABL  CVS: normal s1 and s2 RR  Abd: NABS soft NT ND  Neuro: CN 2-12 grossly intact  Ext: no edema in bilateral LE    -----------------------------------------------------------------------------------------------  PATIENT DISCHARGE INSTRUCTIONS: See electronic chart    Ced Alvarez MD  Hospitalist    Time spent:  > 30 minutes    The 21st Century Cures Act makes medical notes like these available to patients in the interest of transparency. Please be advised this is a medical document. Medical documents are intended to carry relevant information, facts as evident, and the clinical opinion of the practitioner. The medical note is intended as peer to peer communication and may appear blunt or direct. It is written in medical language and may contain abbreviations or verbiage that are unfamiliar.     Electronically signed by Ced Alvarez MD on 8/10/2024 10:24 AM

## (undated) NOTE — LETTER
Hospital Discharge Documentation  Please phone to schedule a hospital follow up appointment. From: [address] Hospitalist's Office  Phone: [phone]    Patient discharged time/date: 2023  2:06 PM  Patient discharge disposition:   [facility]       Discharge Summary - D/C Summary        Discharge Summary signed by Jacinto Chapman DO at 2023  7:57 AM  Version 1 of 1      Author: Jacinto Chapman DO Service: Hospitalist Author Type: Physician    Filed: 2023  7:57 AM Date of Service: 2023  7:55 AM Status: Signed    : Jacinto Chapman DO (Physician)           [facility]    Discharge Summary    Saeid Bose Patient Status:  Outpatient in a Bed    3/30/1965 MRN [id number]   Location [facility] 4W/SW/SE Attending No att. providers found   Hosp Day # 0 PCP Earnest Bland MD     Date of Admission: 11/3/2023 Disposition:  [facility]     Date of Discharge: 2023      Admitting Diagnosis: Primary osteoarthritis of left knee [M17.12]  Class 2 severe obesity due to excess calories with serious comorbidity and body mass index (BMI) of 35.0 to 35.9 in adult  [E66.01, Z68.35]  Primary osteoarthritis of left knee    Hospital Discharge Diagnoses:  [address] Discharge Diagnoses:  OA    Lace+ Score: 24  59-90 High Risk  29-58 Medium Risk  0-28   Low Risk. TCM Follow-Up Recommendation:  LACE < 29: Low Risk of readmission after discharge from the hospital; Still recommend for TCM follow-up.           Lace+ Score: 24  59-90 High Risk  29-58 Medium Risk  0-28   Low Risk    Risk of readmission: Saeid Bose has Low Risk of readmission after discharge from the hospital.    Problem List: Patient Active Problem List:     Vulvodynia     Dyspareunia in female     Diabetes mellitus, type 2 ([address]     Primary osteoarthritis of left knee     Class 1 obesity due to excess calories with serious comorbidity and body mass index (BMI) of 34.0 to 34.9 in adult      Reason for Admission: oa     Physical Exam:   General appearance: alert, appears stated age and cooperative  Pulmonary:  clear to auscultation bilaterally  Cardiovascular: S1, S2 normal, no murmur, click, rub or gallop, regular rate and rhythm  Abdominal: soft, non-tender; bowel sounds normal; no masses,  no organomegaly  Extremities: extremities normal, atraumatic, no cyanosis or edema  Psychiatric: calm        History of Present Illness: Here for elective surgery     Hospital Course:       ASSESSMENT AND PLAN:    1. Primary osteoarthritis of left knee, status post left total arthroplasty. Spinal block. Pain control. Neurovascular checks. Aspirin for DVT prophylaxis. Physical and occupational therapy. 2.       Diabetes mellitus type 2, non-insulin dependent. Continue home medications. Monitor Accu-Cheks. Sliding scale insulin. Consultations: Dr Jaden Perdomo    Procedures: as above     Complications: none     Discharge Condition: Good    Discharge Medications:      Discharge Medications        START taking these medications        Instructions Prescription details   acetaminophen 325 MG Tabs  Commonly known as: Tylenol      Take 2 tablets (650 mg total) by mouth every 8 (eight) hours as needed for Pain. No more than 4000 mg acetaminophen/Tylenol per day from all sources. Recall that each Norco has 325 mg of Tylenol in it. Quantity: 60 tablet  Refills: 0     calcium carbonate-vitamin D 250-3. 125 MG-MCG Tabs  Commonly known as: Oyster Shell-D      Take 1 tablet by mouth 2 (two) times daily with meals. Quantity: 60 tablet  Refills: 0     docusate sodium 100 MG Caps  Commonly known as: COLACE      Take 100 mg by mouth 2 (two) times daily. Do not crush. Stop if loose stool. Quantity: 20 capsule  Refills: 0     HYDROcodone-acetaminophen 7.5-325 MG Tabs  Commonly known as: Norco      Take 1 tablet by mouth every 6 (six) hours as needed for Pain.  Maximum dose of acetaminophen is 4000 mg from all sources in 24 hours. Quantity: 25 tablet  Refills: 0     multivitamin Chew      Chew 1 tablet by mouth daily. Quantity: 60 tablet  Refills: 0     naproxen 250 MG Tabs  Commonly known as: Naprosyn      Take 1 tablet (250 mg total) by mouth 2 (two) times daily with meals. Take with food. Stop if stomach upset. Quantity: 30 tablet  Refills: 0            CHANGE how you take these medications        Instructions Prescription details   aspirin 325 MG Tbec  What changed:   medication strength  how much to take  when to take this  additional instructions      Take 1 tablet (325 mg total) by mouth in the morning and 1 tablet (325 mg total) before bedtime. Do not crush. After 30 days, resume once daily aspirin 81 mg as you were doing before surgery. .   Quantity: 60 tablet  Refills: 0     Jardiance 25 MG Tabs  Generic drug: Empagliflozin  What changed: additional instructions      Take 25 mg by mouth every morning before breakfast.   Quantity: 90 tablet  Refills: 0     Trulicity 3 DZ/0.7MB Sopn  Generic drug: Dulaglutide  What changed: additional instructions      Inject 3 mg into the skin once a week. Quantity: 2 mL  Refills: 2            CONTINUE taking these medications        Instructions Prescription details   ergocalciferol 1.25 MG (75928 UT) Caps  Commonly known as: Vitamin D2      TAKE 1 CAPSULE BY MOUTH 1 TIME A WEEK   Quantity: 8 capsule  Refills: 0     gabapentin 300 MG Caps  Commonly known as: Neurontin      Take 1 capsule (300 mg total) by mouth nightly. Quantity: 30 capsule  Refills: 3     glipiZIDE 5 MG Tabs  Commonly known as: Glucotrol      Take 0.5 tablets (2.5 mg total) by mouth daily with breakfast AND 1 tablet (5 mg total) daily with dinner. Stop taking on: November 8, 2023  Quantity: 135 tablet  Refills: 0     metFORMIN HCl 1000 MG Tabs  Commonly known as: GLUCOPHAGE      Take 1 tablet (1,000 mg total) by mouth 2 (two) times daily.    Refills: 0     OneTouch Ultra Strp  Generic drug: Glucose Blood      TEST TWICE DAILY   Quantity: 200 strip  Refills: 1               Where to Get Your Medications        These medications were sent to [facility], [address], [phone], [phone]  [facility] [employer] [employer] [address]      Phone: [phone]   acetaminophen 325 MG Tabs  aspirin 325 MG Tbec  calcium carbonate-vitamin D 250-3. 125 MG-MCG Tabs  docusate sodium 100 MG Caps  HYDROcodone-acetaminophen 7.5-325 MG Tabs  multivitamin Chew  naproxen 250 MG Tabs         Follow up Visits:  Follow-up with ortho  in 1 week    Follow up Labs: none     Other Discharge Instructions: none    Tommy Aburto DO  11/5/2023  7:55 AM    > 35 min       Electronically signed by Asael Skaggs DO on 11/5/2023  7:57 AM